# Patient Record
Sex: MALE | Race: WHITE | NOT HISPANIC OR LATINO | Employment: FULL TIME | URBAN - METROPOLITAN AREA
[De-identification: names, ages, dates, MRNs, and addresses within clinical notes are randomized per-mention and may not be internally consistent; named-entity substitution may affect disease eponyms.]

---

## 2018-04-22 ENCOUNTER — HOSPITAL ENCOUNTER (EMERGENCY)
Facility: HOSPITAL | Age: 48
Discharge: HOME | End: 2018-04-22
Attending: EMERGENCY MEDICINE
Payer: COMMERCIAL

## 2018-04-22 VITALS
SYSTOLIC BLOOD PRESSURE: 160 MMHG | HEART RATE: 108 BPM | HEIGHT: 71 IN | BODY MASS INDEX: 32.9 KG/M2 | RESPIRATION RATE: 16 BRPM | DIASTOLIC BLOOD PRESSURE: 84 MMHG | WEIGHT: 235 LBS | TEMPERATURE: 99 F | OXYGEN SATURATION: 96 %

## 2018-04-22 DIAGNOSIS — M25.40 JOINT EFFUSION: ICD-10-CM

## 2018-04-22 DIAGNOSIS — S89.91XA INJURY OF RIGHT KNEE, INITIAL ENCOUNTER: Primary | ICD-10-CM

## 2018-04-22 PROCEDURE — 29505 APPLICATION LONG LEG SPLINT: CPT | Mod: RT

## 2018-04-22 PROCEDURE — 99283 EMERGENCY DEPT VISIT LOW MDM: CPT | Mod: 25

## 2018-04-22 PROCEDURE — 99283 EMERGENCY DEPT VISIT LOW MDM: CPT | Mod: ,,, | Performed by: EMERGENCY MEDICINE

## 2018-04-22 PROCEDURE — 25000003 PHARM REV CODE 250: Performed by: PHYSICIAN ASSISTANT

## 2018-04-22 RX ORDER — NAPROXEN 500 MG/1
500 TABLET ORAL 2 TIMES DAILY WITH MEALS
Qty: 14 TABLET | Refills: 0 | Status: SHIPPED | OUTPATIENT
Start: 2018-04-22 | End: 2018-04-29

## 2018-04-22 RX ORDER — TRAMADOL HYDROCHLORIDE 50 MG/1
50 TABLET ORAL
Status: DISCONTINUED | OUTPATIENT
Start: 2018-04-22 | End: 2018-04-22 | Stop reason: HOSPADM

## 2018-04-22 RX ORDER — NAPROXEN 500 MG/1
500 TABLET ORAL
Status: COMPLETED | OUTPATIENT
Start: 2018-04-22 | End: 2018-04-22

## 2018-04-22 RX ORDER — BUPROPION HYDROCHLORIDE 100 MG/1
20 TABLET ORAL DAILY
COMMUNITY

## 2018-04-22 RX ORDER — NAPROXEN 500 MG/1
500 TABLET ORAL
Status: DISCONTINUED | OUTPATIENT
Start: 2018-04-22 | End: 2018-04-22

## 2018-04-22 RX ORDER — TRAMADOL HYDROCHLORIDE 50 MG/1
50 TABLET ORAL EVERY 12 HOURS PRN
Qty: 10 TABLET | Refills: 0 | Status: SHIPPED | OUTPATIENT
Start: 2018-04-22 | End: 2018-04-27

## 2018-04-22 RX ADMIN — NAPROXEN 500 MG: 500 TABLET ORAL at 05:04

## 2018-04-22 NOTE — ED TRIAGE NOTES
Patient states right knee pain after hearing a pop when pushing a 4 woodard up on Saturday at 1100, Ibuprofen (2) at 1000

## 2018-04-22 NOTE — ED PROVIDER NOTES
"Encounter Date: 4/22/2018    SCRIBE #1 NOTE: I, Luis F Estrada, am scribing for, and in the presence of,  Dr. Felipe. I have scribed the following portions of the note - the APC attestation.       History     Chief Complaint   Patient presents with    Knee Injury     Mr Taylor is a 47YOWM who presents with acute knee pain x 1 day, pertinent PMHx HTN. Pt states he was "pushing a four woodard out of the mud" when he felt and heard a "pop" in his R knee followed by immediate pain and swelling. His pain has been stable for the past day but swelling has increased. Patient is most noticeable in full extension of knee. He is NWB 2/2 pain status. Pain is not controlled with OTC advil. No prior trauma nor surgery to R knee. He denies paresthesia, skin laceration, pedal edema, hip pain, CP, SOB, N/V/D.           Review of patient's allergies indicates:   Allergen Reactions    Percocet [oxycodone-acetaminophen] Hives     Past Medical History:   Diagnosis Date    Elevated cholesterol     Hay fever     Hypertension      Past Surgical History:   Procedure Laterality Date    VASECTOMY       Family History   Problem Relation Age of Onset    Heart disease Father     Cancer Father 65     prostate    Hypertension Mother     Cancer Sister      female     Social History   Substance Use Topics    Smoking status: Current Every Day Smoker     Packs/day: 1.00     Years: 30.00     Types: Cigarettes     Last attempt to quit: 2/9/2013    Smokeless tobacco: Never Used      Comment: 15 cigarettes a day    Alcohol use Yes      Comment: yesterday     Review of Systems   Constitutional: Negative for chills, diaphoresis, fatigue and fever.   HENT: Negative for congestion, rhinorrhea, sinus pain, sinus pressure and sore throat.    Eyes: Negative for photophobia and visual disturbance.   Respiratory: Negative for cough, shortness of breath and wheezing.    Cardiovascular: Negative for chest pain, palpitations and leg swelling. "   Gastrointestinal: Negative for abdominal pain, constipation, diarrhea, nausea and vomiting.   Genitourinary: Negative for dysuria, flank pain, frequency and hematuria.   Musculoskeletal: Positive for arthralgias (R knee) and joint swelling.   Skin: Negative for color change, rash and wound.   Allergic/Immunologic: Negative for immunocompromised state.   Neurological: Negative for dizziness, weakness, light-headedness, numbness and headaches.   Psychiatric/Behavioral: Negative for agitation, confusion and decreased concentration. The patient is not nervous/anxious.        Physical Exam     Initial Vitals [04/22/18 1530]   BP Pulse Resp Temp SpO2   (!) 160/84 108 16 99.2 °F (37.3 °C) 96 %      MAP       109.33         Physical Exam    Nursing note and vitals reviewed.  Constitutional: He appears well-developed and well-nourished. He is not diaphoretic. No distress.   HENT:   Head: Normocephalic and atraumatic.   Right Ear: External ear normal.   Left Ear: External ear normal.   Mouth/Throat: No oropharyngeal exudate.   Eyes: Conjunctivae and EOM are normal. Pupils are equal, round, and reactive to light. No scleral icterus.   Neck: Normal range of motion.   Cardiovascular: Normal rate, regular rhythm, normal heart sounds and intact distal pulses.   R pedal pulse intact   Pulmonary/Chest: Breath sounds normal. He has no wheezes.   Abdominal: Soft. Bowel sounds are normal.   Musculoskeletal: Normal range of motion. He exhibits edema and tenderness.        Right knee: He exhibits swelling and effusion. He exhibits no deformity, no laceration, no erythema, normal alignment, no LCL laxity, no bony tenderness and no MCL laxity. No medial joint line, no lateral joint line, no MCL, no LCL and no patellar tendon tenderness noted.        Legs:  Area outlined in blue: area of acute swelling that is fluctuant and non TTP. Area is not warm, red and has no ecchymosis.  R Knee: Pain is most concentrated on posterior aspect of  "knee, with moderate joint effusion but no laci cyst palpable. Pain not reproduced on valgus/varus stress, no TTP over quadriceps tendon, patellar tendon, over patella, nor over midline. Negative anterior/posterior drawer tests, negative Mcmurrays test. NWB on exam; weight bearing reproduces pain in posterior knee.   Patient about to fully extend knee with no tenderness over quadriceps tendon. Able to fully flex knee with moderate pain to posterior knee.   Neurological: He is alert and oriented to person, place, and time. He has normal strength. No cranial nerve deficit or sensory deficit.   Skin: Skin is warm. Capillary refill takes less than 2 seconds. No rash noted. No erythema.   Psychiatric: He has a normal mood and affect. His behavior is normal. Thought content normal.         ED Course   Procedures  Labs Reviewed - No data to display     Imaging Results          X-Ray Knee 3 View Right (Final result)  Result time 04/22/18 17:08:57    Final result by Keagan Allen MD (04/22/18 17:08:57)                 Impression:      1. No acute displaced fracture or dislocation of the knee noting possible minimal suprapatellar effusion.      Electronically signed by: Keagan Allen MD  Date:    04/22/2018  Time:    17:08             Narrative:    EXAMINATION:  XR KNEE 3 VIEW RIGHT    CLINICAL HISTORY:  Effusion, unspecified joint    TECHNIQUE:  AP, lateral, and Merchant views of the right knee were performed.    COMPARISON:  None    FINDINGS:  No acute displaced fracture or dislocation of the knee.  There is a minimal suprapatellar effusion.  No radiopaque foreign body.                                   Medical Decision Making:   History:   Old Medical Records: I decided to obtain old medical records.  Initial Assessment:   Pt presents with acute R knee pain after hearing a "pop" while attempting to move 4-woodard. Joint effusion present with fluid concentration in medio-caudal aspect of R knee, most tenderness " concentrated to posterior knee through physical exam. All knee manipulation tests negative.  Differential Diagnosis:   DDX soft tissue injury, meniscus injurypartial quadriceps tendon tear, Baker's cyst rupture, PCL tear, ACL tear. Physical exam and history taking decrease clinical suspicion for patellar tendon rupture, patellar fracture, Osgood Schlatter's, joint hemorrhage, DVT.   Clinical Tests:   Radiological Study: Ordered and Reviewed  ED Management:  Plain films obtained, they reveal no acute bony abnormality but did a mild suprapatellar effusion. Patient given naproxen and Tramadol in ED for pain control d/t allergic reaction to Percocet. Soft tissue injury likely, unable to currently rule out ligamentous or meniscal injury to knee. We discussed ambulatory referral and follow up to orthopedics this week to pursue further imaging and ensure proper injury healing. He was given a knee immobilizer and crutches in ED and was counseled on conservative home care until ortho f/u. Given strict ED return instructions and short course of Rx Tramadol and naproxen. He agreed with plan of care and voiced understanding.             Scribe Attestation:   Scribe #1: I performed the above scribed service and the documentation accurately describes the services I performed. I attest to the accuracy of the note.    Attending Attestation:     Physician Attestation Statement for NP/PA:   I have conducted a face to face encounter with this patient in addition to the NP/PA, due to NP/PA Request          Attending ED Notes:   Upon my evaluation patient was ambulating with knee immbolizer in place, carrying his crutches. Advised patient to use weight bearing as tolerated, elevated and ice leg. Recommend ortho follow up if pain and swelling persistent for further evaluation of possible ligamentous injury.             Clinical Impression:   The primary encounter diagnosis was Injury of right knee, initial encounter. A diagnosis of Joint  effusion was also pertinent to this visit.    Disposition:   Disposition: Discharged  Condition: Stable    Endy Felipe MD  04/24/2018                      Ny Cabrera PA-C  04/22/18 [id number]       Endy Felipe MD  04/24/18 [id number]

## 2018-04-22 NOTE — ED NOTES
Patient identifiers verified and correct for Mr Taylor  C/C: Right knee pain   APPEARANCE: awake and alert in NAD.  SKIN: warm, dry and intact. No breakdown or bruising.  MUSCULOSKELETAL: Patient moving all extremities spontaneously, no obvious swelling or deformities noted. Ambulates independently.  RESPIRATORY: Denies shortness of breath.Respirations unlabored.   CARDIAC: Denies CP, 2+ distal pulses; no peripheral edema  ABDOMEN: S/ND/NT, Denies nausea  : voids spontaneously, denies difficulty  Neurologic: AAO x 4; follows commands equal strength in all extremities; denies numbness/tingling. Denies dizziness Denies weakness, positive sensation, mvmt and positive pedal pulses, color good RLE,

## 2018-04-22 NOTE — DISCHARGE INSTRUCTIONS
Take prescription medications as needed for knee pain. Use immobilizer throughout day in addition to heat, ice, and elevation. Follow up with orthopedics this week to pursue further imaging if indicated. Return to the ED immediately if knee becomes red, warm, if swelling increases, or if you develop fevers and chills.     Our goal in the emergency department is to always give you outstanding care and exceptional service. You may receive a survey by mail or e-mail in the next week regarding your experience in our ED. We would greatly appreciate your completing and returning the survey. Your feedback provides us with a way to recognize our staff who give very good care and it helps us learn how to improve when your experience was below our aspiration of excellence.

## 2018-05-18 ENCOUNTER — OFFICE VISIT (OUTPATIENT)
Dept: SPORTS MEDICINE | Facility: CLINIC | Age: 48
End: 2018-05-18
Payer: COMMERCIAL

## 2018-05-18 VITALS — BODY MASS INDEX: 32.9 KG/M2 | HEIGHT: 71 IN | WEIGHT: 235 LBS

## 2018-05-18 DIAGNOSIS — M23.91 KNEE INTERNAL DERANGEMENT, RIGHT: ICD-10-CM

## 2018-05-18 DIAGNOSIS — M25.461 KNEE EFFUSION, RIGHT: Primary | ICD-10-CM

## 2018-05-18 DIAGNOSIS — M25.561 ACUTE PAIN OF RIGHT KNEE: ICD-10-CM

## 2018-05-18 DIAGNOSIS — S76.311A STRAIN OF RIGHT HAMSTRING: ICD-10-CM

## 2018-05-18 PROCEDURE — 99999 PR PBB SHADOW E&M-EST. PATIENT-LVL III: CPT | Mod: PBBFAC,,, | Performed by: PHYSICIAN ASSISTANT

## 2018-05-18 PROCEDURE — 3008F BODY MASS INDEX DOCD: CPT | Mod: CPTII,S$GLB,, | Performed by: PHYSICIAN ASSISTANT

## 2018-05-18 PROCEDURE — 99204 OFFICE O/P NEW MOD 45 MIN: CPT | Mod: S$GLB,,, | Performed by: PHYSICIAN ASSISTANT

## 2018-05-18 RX ORDER — MELOXICAM 15 MG/1
15 TABLET ORAL DAILY
Qty: 30 TABLET | Refills: 0 | Status: SHIPPED | OUTPATIENT
Start: 2018-05-18 | End: 2018-06-06 | Stop reason: ALTCHOICE

## 2018-05-18 NOTE — PROGRESS NOTES
Subjective:          Chief Complaint: Fredy Taylor Jr. is a 47 y.o. male who had concerns including Pain of the Right Knee and Knee Pain (right knee ).    HPI   Patient who is a  presents to clinic with right knee pain x 4 weeks. He was riding a 4 woodard when the injury occurred. It was about to flip over so he stuck his right leg out to catch himself. He experienced a large effusion and pain with ambulation. He went to the [facility] ED, where he had an xray taken and was placed in an immobilizer. He wore the immobilizer for 1 week. He has been elevating his knee and taking ibuprofen as needed for pain. Denies mechanical symptoms. Pain is worse with twisting of the knee. Pain at its worst is 7-8/10.  Pain at rest is 5/10.     Review of Systems   Constitution: Negative. Negative for chills, fever, weight gain and weight loss.   HENT: Negative for congestion and sore throat.    Eyes: Negative for blurred vision and double vision.   Cardiovascular: Negative for chest pain, leg swelling and palpitations.   Respiratory: Negative for cough and shortness of breath.    Hematologic/Lymphatic: Does not bruise/bleed easily.   Skin: Negative for itching, poor wound healing and rash.   Musculoskeletal: Positive for joint pain, joint swelling and stiffness. Negative for back pain, muscle weakness and myalgias.   Gastrointestinal: Negative for abdominal pain, constipation, diarrhea, nausea and vomiting.   Genitourinary: Negative.  Negative for frequency and hematuria.   Neurological: Negative for dizziness, headaches, numbness, paresthesias and sensory change.   Psychiatric/Behavioral: Negative for altered mental status and depression. The patient is not nervous/anxious.    Allergic/Immunologic: Negative for hives.       Pain Related Questions  Over the past 3 days, what was your average pain during activity? (I.e. running, jogging, walking, climbing stairs, getting dressed, ect.): 7  Over the past 3 days, what was  your highest pain level?: 7  Over the past 3 days, what was your lowest pain level? : 2    Other  How many nights a week are you awakened by your affected body part?: 0  Was the patient's HEIGHT measured or patient reported?: Patient Reported  Was the patient's WEIGHT measured or patient reported?: Patient Reported      Objective:        General: Fredy is well-developed, well-nourished, appears stated age, in no acute distress, alert and oriented to time, place and person.     General    Vitals reviewed.  Constitutional: He is oriented to person, place, and time. He appears well-developed and well-nourished. No distress.   HENT:   Head: Normocephalic and atraumatic.   Eyes: EOM are normal.   Neck: Normal range of motion.   Cardiovascular: Normal rate and regular rhythm.    Pulmonary/Chest: Effort normal. No respiratory distress.   Neurological: He is alert and oriented to person, place, and time. He has normal reflexes. No cranial nerve deficit. Coordination normal.   Psychiatric: He has a normal mood and affect. His behavior is normal. Judgment and thought content normal.     General Musculoskeletal Exam   Gait: antalgic and abnormal       Right Knee Exam     Inspection   Erythema: absent  Scars: absent  Swelling: present  Effusion: present  Deformity: deformity  Bruising: absent    Tenderness   The patient is tender to palpation of the medial joint line, lateral joint line and medial hamstring.    Range of Motion   Extension:  0 normal   Flexion:  130 normal     Tests   Meniscus   Mikel:  Medial - positive Lateral - positive  Ligament Examination   Lachman: abnormal - grade IPCL-Posterior Drawer: normal (0 to 2mm)     MCL - Valgus: normal (0 to 2mm)  LCL - Varus: normalPivot Shift: normal (Equal)Reverse Pivot Shift: normal (Equal)Dial Test at 30 degrees: normal (< 5 degrees)Dial Test at 90 degrees: normal (< 5 degrees)  Posterolateral Corner: unstable (>15 degrees difference)  Patella   Passive Patellar Tilt:  neutral  Patellar Glide (quadrants): Lateral - 1   Medial - 2  Patellar Grind: negative    Other   Muscle Tightness: hamstring tightness  Sensation: normal    Left Knee Exam     Inspection   Erythema: absent  Scars: absent  Swelling: absent  Effusion: absent  Deformity: deformity  Bruising: absent    Tenderness   The patient is experiencing no tenderness.         Range of Motion   Extension:  0 normal   Flexion: normal Left knee flexion: 135.     Tests   Meniscus   Mikel:  Medial - negative Lateral - negative  Stability Lachman: normal (-1 to 2mm) PCL-Posterior Drawer: normal (0 to 2mm)  MCL - Valgus: normal (0 to 2mm)  LCL - Varus: normal (0 to 2mm)Pivot Shift: normal (Equal)Reverse Pivot Shift: normal (Equal)Dial Test at 30 degrees: normal (< 5 degrees)Dial Test at 90 degrees: normal (< 5 degrees)  Posterolateral Corner: unstable (>15 degrees difference)  Patella   Passive Patellar Tilt: neutral  Patellar Glide (Quadrants): Lateral - 1 Medial - 2  Patellar Grind: negative    Other   Sensation: normal    Muscle Strength   Right Lower Extremity   Hip Abduction: 5/5   Quadriceps:  5/5   Hamstrin/5   Left Lower Extremity   Hip Abduction: 5/5   Quadriceps:  5/5   Hamstrin/5     Reflexes     Left Side  Quadriceps:  2+  Achilles:  2+    Right Side   Quadriceps:  2+  Achilles:  2+    Vascular Exam     Right Pulses  Dorsalis Pedis:      2+  Posterior Tibial:      2+        Left Pulses  Dorsalis Pedis:      2+  Posterior Tibial:      2+          RADIOGRAPHS:  FINDINGS:  No acute displaced fracture or dislocation of the knee.  There is a minimal suprapatellar effusion.  No radiopaque foreign body.          Assessment:       Encounter Diagnoses   Name Primary?    Knee effusion, right Yes    Acute pain of right knee     Knee internal derangement, right     Strain of right hamstring           Plan:       1. MRI of right knee to rule out medial/lateral meniscus tear and possible ACL tear    2. Mobic 15 mg once a  day prn pain    3. Ice/ Elevate/compression    4. [id number] - Tay , performed a custom orthotic / brace adjustment, fitting and training with the patient. The patient demonstrated understanding and proper care. This was performed for 15 minutes. Viscoskin    5. The total face-to-face encounter time with this patient was 45  minutes and greater than 50% of of the encounter time was spent counseling the patient and coordinating care. Significant time was also spent educating the patient, giving detail post-visit instructions, and discussing risks and benefits of treatment. Patient also had several specific questions that were answered during the visit today.     6. RTC in 1 week with Keila Moody PA-C for MRI results review                Patient questionnaires may have been collected.

## 2018-05-24 ENCOUNTER — HOSPITAL ENCOUNTER (OUTPATIENT)
Dept: RADIOLOGY | Facility: HOSPITAL | Age: 48
Discharge: HOME | End: 2018-05-24
Attending: PHYSICIAN ASSISTANT
Payer: COMMERCIAL

## 2018-05-24 DIAGNOSIS — M25.561 ACUTE PAIN OF RIGHT KNEE: ICD-10-CM

## 2018-05-24 DIAGNOSIS — M25.461 KNEE EFFUSION, RIGHT: ICD-10-CM

## 2018-05-24 DIAGNOSIS — M23.91 KNEE INTERNAL DERANGEMENT, RIGHT: ICD-10-CM

## 2018-05-24 PROCEDURE — 73721 MRI JNT OF LWR EXTRE W/O DYE: CPT | Mod: 26,RT,, | Performed by: RADIOLOGY

## 2018-05-24 PROCEDURE — 73721 MRI JNT OF LWR EXTRE W/O DYE: CPT | Mod: TC,RT

## 2018-06-05 ENCOUNTER — TELEPHONE (OUTPATIENT)
Dept: SPORTS MEDICINE | Facility: CLINIC | Age: 48
End: 2018-06-05

## 2018-06-06 ENCOUNTER — OFFICE VISIT (OUTPATIENT)
Dept: SPORTS MEDICINE | Facility: CLINIC | Age: 48
End: 2018-06-06
Payer: COMMERCIAL

## 2018-06-06 VITALS
WEIGHT: 235 LBS | SYSTOLIC BLOOD PRESSURE: 154 MMHG | DIASTOLIC BLOOD PRESSURE: 84 MMHG | BODY MASS INDEX: 32.9 KG/M2 | HEART RATE: 95 BPM | HEIGHT: 71 IN

## 2018-06-06 DIAGNOSIS — S83.511D RUPTURE OF ANTERIOR CRUCIATE LIGAMENT OF RIGHT KNEE, SUBSEQUENT ENCOUNTER: ICD-10-CM

## 2018-06-06 DIAGNOSIS — M23.91 ACUTE INTERNAL DERANGEMENT OF RIGHT KNEE: ICD-10-CM

## 2018-06-06 DIAGNOSIS — M25.561 ACUTE PAIN OF RIGHT KNEE: Primary | ICD-10-CM

## 2018-06-06 PROBLEM — S83.511A RUPTURE OF ANTERIOR CRUCIATE LIGAMENT OF RIGHT KNEE: Status: ACTIVE | Noted: 2018-06-06

## 2018-06-06 PROCEDURE — 99999 PR PBB SHADOW E&M-EST. PATIENT-LVL III: CPT | Mod: PBBFAC,,, | Performed by: ORTHOPAEDIC SURGERY

## 2018-06-06 PROCEDURE — 99214 OFFICE O/P EST MOD 30 MIN: CPT | Mod: S$GLB,,, | Performed by: ORTHOPAEDIC SURGERY

## 2018-06-06 PROCEDURE — 3008F BODY MASS INDEX DOCD: CPT | Mod: CPTII,S$GLB,, | Performed by: ORTHOPAEDIC SURGERY

## 2018-06-06 PROCEDURE — 3079F DIAST BP 80-89 MM HG: CPT | Mod: CPTII,S$GLB,, | Performed by: ORTHOPAEDIC SURGERY

## 2018-06-06 PROCEDURE — 3077F SYST BP >= 140 MM HG: CPT | Mod: CPTII,S$GLB,, | Performed by: ORTHOPAEDIC SURGERY

## 2018-06-06 RX ORDER — CELECOXIB 200 MG/1
200 CAPSULE ORAL 2 TIMES DAILY
Qty: 60 CAPSULE | Refills: 2 | Status: SHIPPED | OUTPATIENT
Start: 2018-06-06 | End: 2018-07-06
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
## 2018-06-11 ENCOUNTER — TELEPHONE (OUTPATIENT)
Dept: SPORTS MEDICINE | Facility: CLINIC | Age: 48
End: 2018-06-11

## 2018-06-11 NOTE — TELEPHONE ENCOUNTER
----- Message from Alissa Robin sent at 6/11/2018  8:53 AM CDT -----  Contact: self  Pt called requesting a call back from Lula regarding questions about restrictions for his right knee. Pt could be reached at [phone]

## 2018-06-11 NOTE — TELEPHONE ENCOUNTER
Spoke with the patient. Explained that prior to surgery, he should avoid running, jumping and pivoting movements. He can take OTC Tylenol with the Celebrex.

## 2018-06-13 DIAGNOSIS — S83.511A RUPTURE OF ANTERIOR CRUCIATE LIGAMENT OF RIGHT KNEE, INITIAL ENCOUNTER: Primary | ICD-10-CM

## 2018-06-20 ENCOUNTER — TELEPHONE (OUTPATIENT)
Dept: SPORTS MEDICINE | Facility: CLINIC | Age: 48
End: 2018-06-20

## 2018-06-20 NOTE — TELEPHONE ENCOUNTER
Received FMLA and STD forms to fill out. Forms were completed and faxed to the number on the form: [phone].    [facility]

## 2018-06-25 ENCOUNTER — OFFICE VISIT (OUTPATIENT)
Dept: SPORTS MEDICINE | Facility: CLINIC | Age: 48
End: 2018-06-25
Payer: COMMERCIAL

## 2018-06-25 ENCOUNTER — ANESTHESIA EVENT (OUTPATIENT)
Dept: SURGERY | Facility: OTHER | Age: 48
End: 2018-06-25
Payer: COMMERCIAL

## 2018-06-25 ENCOUNTER — HOSPITAL ENCOUNTER (OUTPATIENT)
Dept: PREADMISSION TESTING | Facility: OTHER | Age: 48
Discharge: HOME | End: 2018-06-25
Attending: ORTHOPAEDIC SURGERY
Payer: COMMERCIAL

## 2018-06-25 ENCOUNTER — TELEPHONE (OUTPATIENT)
Dept: SPORTS MEDICINE | Facility: CLINIC | Age: 48
End: 2018-06-25

## 2018-06-25 VITALS
OXYGEN SATURATION: 97 % | TEMPERATURE: 99 F | HEART RATE: 91 BPM | SYSTOLIC BLOOD PRESSURE: 121 MMHG | HEIGHT: 71 IN | DIASTOLIC BLOOD PRESSURE: 64 MMHG | BODY MASS INDEX: 32.9 KG/M2 | WEIGHT: 235 LBS

## 2018-06-25 VITALS
WEIGHT: 235 LBS | HEIGHT: 71 IN | SYSTOLIC BLOOD PRESSURE: 124 MMHG | HEART RATE: 89 BPM | DIASTOLIC BLOOD PRESSURE: 75 MMHG | BODY MASS INDEX: 32.9 KG/M2

## 2018-06-25 DIAGNOSIS — S83.511D RUPTURE OF ANTERIOR CRUCIATE LIGAMENT OF RIGHT KNEE, SUBSEQUENT ENCOUNTER: Primary | ICD-10-CM

## 2018-06-25 DIAGNOSIS — M23.91 ACUTE INTERNAL DERANGEMENT OF RIGHT KNEE: ICD-10-CM

## 2018-06-25 DIAGNOSIS — M25.561 ACUTE PAIN OF RIGHT KNEE: ICD-10-CM

## 2018-06-25 DIAGNOSIS — S83.519A ACL TEAR: ICD-10-CM

## 2018-06-25 PROCEDURE — 3074F SYST BP LT 130 MM HG: CPT | Mod: CPTII,S$GLB,, | Performed by: ORTHOPAEDIC SURGERY

## 2018-06-25 PROCEDURE — 99214 OFFICE O/P EST MOD 30 MIN: CPT | Mod: S$GLB,,, | Performed by: ORTHOPAEDIC SURGERY

## 2018-06-25 PROCEDURE — 3078F DIAST BP <80 MM HG: CPT | Mod: CPTII,S$GLB,, | Performed by: ORTHOPAEDIC SURGERY

## 2018-06-25 PROCEDURE — 99999 PR PBB SHADOW E&M-EST. PATIENT-LVL III: CPT | Mod: PBBFAC,,, | Performed by: ORTHOPAEDIC SURGERY

## 2018-06-25 PROCEDURE — 3008F BODY MASS INDEX DOCD: CPT | Mod: CPTII,S$GLB,, | Performed by: ORTHOPAEDIC SURGERY

## 2018-06-25 RX ORDER — SODIUM CHLORIDE 9 MG/ML
INJECTION, SOLUTION INTRAVENOUS CONTINUOUS
Status: CANCELLED | OUTPATIENT
Start: 2018-06-25

## 2018-06-25 RX ORDER — TRAMADOL HYDROCHLORIDE 50 MG/1
50 TABLET ORAL EVERY 6 HOURS PRN
Qty: 60 TABLET | Refills: 0 | Status: SHIPPED | OUTPATIENT
Start: 2018-06-25 | End: 2018-07-05

## 2018-06-25 RX ORDER — CELECOXIB 200 MG/1
200 CAPSULE ORAL 2 TIMES DAILY
Qty: 84 CAPSULE | Refills: 0 | Status: SHIPPED | OUTPATIENT
Start: 2018-06-25 | End: 2018-08-06

## 2018-06-25 RX ORDER — LIDOCAINE HYDROCHLORIDE 10 MG/ML
0.5 INJECTION, SOLUTION EPIDURAL; INFILTRATION; INTRACAUDAL; PERINEURAL ONCE
Status: CANCELLED | OUTPATIENT
Start: 2018-06-25 | End: 2018-06-25

## 2018-06-25 RX ORDER — MUPIROCIN 20 MG/G
OINTMENT TOPICAL
Status: CANCELLED | OUTPATIENT
Start: 2018-06-25

## 2018-06-25 RX ORDER — HYDROCODONE BITARTRATE AND ACETAMINOPHEN 10; 325 MG/1; MG/1
1 TABLET ORAL EVERY 6 HOURS PRN
Qty: 50 TABLET | Refills: 0 | Status: ON HOLD | OUTPATIENT
Start: 2018-06-25 | End: 2023-11-09 | Stop reason: HOSPADM

## 2018-06-25 RX ORDER — SODIUM CHLORIDE, SODIUM LACTATE, POTASSIUM CHLORIDE, CALCIUM CHLORIDE 600; 310; 30; 20 MG/100ML; MG/100ML; MG/100ML; MG/100ML
INJECTION, SOLUTION INTRAVENOUS CONTINUOUS
Status: CANCELLED | OUTPATIENT
Start: 2018-06-25

## 2018-06-25 RX ORDER — ASPIRIN 325 MG
325 TABLET ORAL DAILY
Qty: 42 TABLET | Refills: 0 | COMMUNITY
Start: 2018-06-25 | End: 2018-08-23

## 2018-06-25 RX ORDER — PROMETHAZINE HYDROCHLORIDE 25 MG/1
25 TABLET ORAL EVERY 6 HOURS PRN
Qty: 20 TABLET | Refills: 0 | Status: ON HOLD | OUTPATIENT
Start: 2018-06-25 | End: 2023-11-09 | Stop reason: HOSPADM

## 2018-06-25 RX ORDER — FAMOTIDINE 20 MG/1
20 TABLET, FILM COATED ORAL
Status: CANCELLED | OUTPATIENT
Start: 2018-06-25 | End: 2018-06-25

## 2018-06-25 RX ORDER — MIDAZOLAM HYDROCHLORIDE 5 MG/ML
5 INJECTION INTRAMUSCULAR; INTRAVENOUS ONCE AS NEEDED
Status: CANCELLED | OUTPATIENT
Start: 2018-06-25 | End: 2018-06-25

## 2018-06-25 NOTE — TELEPHONE ENCOUNTER
Called patient and let him know that he needs to keep taking there celebrex leading up to his surgery.

## 2018-06-25 NOTE — TELEPHONE ENCOUNTER
----- Message from Annabella Vaughan sent at 6/25/2018 12:18 PM CDT -----  Contact: Self  He saw an anesthesiologist today as a pre-op visit and they told him to stop taking his medication prior to surgery. celecoxib (CELEBREX) 200 MG capsule. He just wants to verify that this is OK with Dr. Coffey.     Patient can be reached at [phone]

## 2018-06-25 NOTE — H&P
Fredy SANTOS Claudia Santana  is here for a completion of his perioperative paperwork. he  Is scheduled to undergo   1. Right knee arthroscopic ACL repair versus reconstruction  2. Right knee arthroscopic chondroplasty  3. Right knee arthroscopic synovectomy  4. Right knee Amniox arthrocentesis 99131 on 7/10/18.  He is a healthy individual and does not need clearance for this procedure.     Risks, indications and benefits of the surgical procedure were discussed with the patient. All questions with regard to surgery, rehab, expected return to functional activities, activities of daily living and recreational endeavors were answered to his satisfaction.    Once no other questions were asked, a brief history and physical exam was then performed.      PAST MEDICAL HISTORY:   Past Medical History:   Diagnosis Date    Elevated cholesterol     Hay fever     Hypertension      PAST SURGICAL HISTORY:   Past Surgical History:   Procedure Laterality Date    VASECTOMY       FAMILY HISTORY:   Family History   Problem Relation Age of Onset    Heart disease Father     Cancer Father 65        prostate    Hypertension Mother     Cancer Sister         female     SOCIAL HISTORY:   Social History     Social History    Marital status: Single     Spouse name: N/A    Number of children: N/A    Years of education: N/A     Occupational History     [employer]     Social History Main Topics    Smoking status: Current Every Day Smoker     Packs/day: 1.00     Years: 30.00     Types: Cigarettes     Last attempt to quit: 2/9/2013    Smokeless tobacco: Never Used      Comment: 15 cigarettes a day    Alcohol use Yes      Comment: yesterday    Drug use: No    Sexual activity: Not on file     Other Topics Concern    Not on file     Social History Narrative    No narrative on file       MEDICATIONS:   Current Outpatient Prescriptions:     buPROPion (WELLBUTRIN) 100 MG tablet, Take 20 mg by mouth once daily., Disp: , Rfl:      celecoxib (CELEBREX) 200 MG capsule, Take 1 capsule (200 mg total) by mouth 2 (two) times daily., Disp: 60 capsule, Rfl: 2    clotrimazole-betamethasone 1-0.05% (LOTRISONE) cream, Apply topically 2 (two) times daily., Disp: 45 g, Rfl: 0    fluticasone (FLONASE) 50 mcg/actuation nasal spray, 1 spray by Each Nare route once daily., Disp: , Rfl:     losartan (COZAAR) 25 MG tablet, Take 1 tablet (25 mg total) by mouth once daily., Disp: 30 tablet, Rfl: 6  ALLERGIES:   Review of patient's allergies indicates:   Allergen Reactions    Percocet [oxycodone-acetaminophen] Hives       VITAL SIGNS: There were no vitals taken for this visit.       PHYSICAL EXAM:  GEN: A&Ox3, WD WN NAD  HEENT: WNL  CHEST: CTAB, no W/R/R  HEART: RRR, no M/R/G  ABD: Soft, NT ND, BS x4 QUADS  MS; See Epic  NEURO: CN II-XII intact       The surgical consent was then reviewed with the patient, who agreed with all the contents of the consent form and it was signed. he was then given the [facility] surgery packet to bring with him to [facility] for the anesthesia portion of his perioperative paperwork.     PHYSICAL THERAPY:  He was also instructed regarding physical therapy and will begin on  POD 3.     POST OP CARE:instructions were reviewed including care of the wound and dressing after surgery and when he can shower.     PAIN MANAGEMENT: Fredy Taylor Jr. was also given  pain management regime, which includes the TENS unit given to him by Faraz Perez along with the education required for its use. He was also instructed regarding the Polar ice unit that will be in place after surgery and his postoperative pain medications.     PAIN MEDICATION:  Hydrocodone 10/325mg 1 po q 4-6 hours prn pain  Ultram 50 mg one p.o. q.4-6 hours p.r.n. breakthrough pain,   Phenergan 25 mg one p.o. q.4-6 hours p.r.n. nausea and vomiting.    As there were no other questions to be asked, he was given my business card along with Ryland Coffey MD business card if he has any  questions or concerns prior to surgery or in the postop period.

## 2018-06-25 NOTE — ANESTHESIA PREPROCEDURE EVALUATION
06/25/2018  Fredy Taylor Jr. is a 47 y.o., male.    Anesthesia Evaluation    I have reviewed the Patient Summary Reports.    I have reviewed the Nursing Notes.   I have reviewed the Medications.     Review of Systems  Anesthesia Hx:  No problems with previous Anesthesia  Denies Family Hx of Anesthesia complications.   Denies Personal Hx of Anesthesia complications.   Social:  Non-Smoker, Smoker Quit 18 mos ago, prev 1 ppd for 22 y   Hematology/Oncology:  Hematology Normal   Oncology Normal     EENT/Dental:EENT/Dental Normal   Cardiovascular:   Exercise tolerance: good Hypertension, well controlled    Pulmonary:  Pulmonary Normal    Renal/:  Renal/ Normal     Hepatic/GI:  Hepatic/GI Normal    Musculoskeletal:  Musculoskeletal Normal    Neurological:  Neurology Normal    Endocrine:  Endocrine Normal    Dermatological:  Skin Normal    Psych:   Psychiatric History depression          Physical Exam  General:  Well nourished, Obesity    Airway/Jaw/Neck:  Airway Findings: Mouth Opening: Small, but > 3cm General Airway Assessment: Possible difficult mask airway, Possible difficult intubation  Mallampati: III  Improves to II with phonation.  TM Distance: Normal, at least 6 cm  Jaw/Neck Findings:  Neck ROM: Normal ROM  Neck Findings:  Girth Increased      Dental:  Dental Findings: In tact              Anesthesia Plan  Type of Anesthesia, risks & benefits discussed:  Anesthesia Type:  general  Patient's Preference:   Intra-op Monitoring Plan: standard ASA monitors  Intra-op Monitoring Plan Comments:   Post Op Pain Control Plan: per primary service following discharge from PACU and peripheral nerve block  Post Op Pain Control Plan Comments: Discussed  plexus block with sedation  Induction:   IV  Beta Blocker:         Informed Consent: Patient understands risks and agrees with Anesthesia plan.  Questions answered.  Anesthesia consent signed with patient.  ASA Score: 2     Day of Surgery Review of History & Physical:    H&P update referred to the surgeon.     Anesthesia Plan Notes: No labs.        Ready For Surgery From Anesthesia Perspective.

## 2018-06-25 NOTE — DISCHARGE INSTRUCTIONS
PRE-ADMIT TESTING -  [phone]    [address]  [place] [facility]          Your surgery has been scheduled at [facility]. We are pleased to have the opportunity to serve you. For Further Information please call [phone].    On the day of surgery please report to the Information Desk on the 1st floor.    · CONTACT YOUR PHYSICIAN'S OFFICE THE DAY PRIOR TO YOUR SURGERY TO OBTAIN YOUR ARRIVAL TIME.     · The evening before surgery do not eat anything after 9 p.m. ( this includes hard candy, chewing gum and mints).  You may only have GATORADE, POWERADE AND WATER  from 9 p.m. until you leave your home.   DO NOT DRINK ANY LIQUIDS ON THE WAY TO THE HOSPITAL.      SPECIAL MEDICATION INSTRUCTIONS: TAKE medications checked off by the Anesthesiologist on your Medication List.    Angiogram Patients: Take medications as instructed by your physician, including aspirin.     Surgery Patients:    If you take ASPIRIN - Your PHYSICIAN/SURGEON will need to inform you IF/OR when you need to stop taking aspirin prior to your surgery.     Do Not take any medications containing IBUPROFEN.  Do Not Wear any make-up or dark nail polish   (especially eye make-up) to surgery. If you come to surgery with makeup on you will be required to remove the makeup or nail polish.    Do not shave your surgical area at least 5 days prior to your surgery. The surgical prep will be performed at the hospital according to Infection Control regulations.    Leave all valuables at home.   Do Not wear any jewelry or watches, including any metal in body piercings.  Contact Lens must be removed before surgery. Either do not wear the contact lens or bring a case and solution for storage.  Please bring a container for eyeglasses or dentures as required.  Bring any paperwork your physician has provided, such as consent forms,  history and physicals, doctor's orders, etc.   Bring comfortable clothes that are loose fitting to wear upon  discharge. Take into consideration the type of surgery being performed.  Maintain your diet as advised per your physician the day prior to surgery.      Adequate rest the night before surgery is advised.   Park in the Parking lot behind the hospital or in the [place] [facility] across the street from the parking lot. Parking is complimentary.  If you will be discharged the same day as your procedure, please arrange for a responsible adult to drive you home or to accompany you if traveling by taxi.   YOU WILL NOT BE PERMITTED TO DRIVE OR TO LEAVE THE HOSPITAL ALONE AFTER SURGERY.   It is strongly recommended that you arrange for someone to remain with you for the first 24 hrs following your surgery.       Thank you for your cooperation.  The Staff of [facility].        Bathing Instructions                                                                 Please shower the evening before and morning of your procedure with    ANTIBACTERIAL SOAP. ( DIAL, etc )  Concentrate on the surgical area   for at least 3 minutes and rinse completely. Dry off as usual.   Do not use any deodorant, powder, body lotions, perfume, after shave or    cologne.

## 2018-07-09 ENCOUNTER — TELEPHONE (OUTPATIENT)
Dept: SPORTS MEDICINE | Facility: CLINIC | Age: 48
End: 2018-07-09

## 2018-07-10 ENCOUNTER — HOSPITAL ENCOUNTER (OUTPATIENT)
Facility: OTHER | Age: 48
Discharge: HOME | End: 2018-07-10
Attending: ORTHOPAEDIC SURGERY | Admitting: ORTHOPAEDIC SURGERY
Payer: COMMERCIAL

## 2018-07-10 ENCOUNTER — ANESTHESIA (OUTPATIENT)
Dept: SURGERY | Facility: OTHER | Age: 48
End: 2018-07-10
Payer: COMMERCIAL

## 2018-07-10 VITALS
SYSTOLIC BLOOD PRESSURE: 142 MMHG | TEMPERATURE: 98 F | DIASTOLIC BLOOD PRESSURE: 77 MMHG | RESPIRATION RATE: 16 BRPM | OXYGEN SATURATION: 94 % | BODY MASS INDEX: 32.9 KG/M2 | HEIGHT: 71 IN | HEART RATE: 86 BPM | WEIGHT: 235 LBS

## 2018-07-10 DIAGNOSIS — S83.519A ACL TEAR: ICD-10-CM

## 2018-07-10 DIAGNOSIS — S83.511D RUPTURE OF ANTERIOR CRUCIATE LIGAMENT OF RIGHT KNEE, SUBSEQUENT ENCOUNTER: ICD-10-CM

## 2018-07-10 PROCEDURE — C1713 ANCHOR/SCREW BN/BN,TIS/BN: HCPCS | Performed by: ORTHOPAEDIC SURGERY

## 2018-07-10 PROCEDURE — 36000711: Performed by: ORTHOPAEDIC SURGERY

## 2018-07-10 PROCEDURE — V2790 AMNIOTIC MEMBRANE: HCPCS | Performed by: ORTHOPAEDIC SURGERY

## 2018-07-10 PROCEDURE — 71000033 HC RECOVERY, INTIAL HOUR: Performed by: ORTHOPAEDIC SURGERY

## 2018-07-10 PROCEDURE — 63600175 PHARM REV CODE 636 W HCPCS: Performed by: NURSE ANESTHETIST, CERTIFIED REGISTERED

## 2018-07-10 PROCEDURE — 36000710: Performed by: ORTHOPAEDIC SURGERY

## 2018-07-10 PROCEDURE — 63600175 PHARM REV CODE 636 W HCPCS: Performed by: ANESTHESIOLOGY

## 2018-07-10 PROCEDURE — 25000003 PHARM REV CODE 250: Performed by: SPECIALIST

## 2018-07-10 PROCEDURE — 63600175 PHARM REV CODE 636 W HCPCS: Performed by: ORTHOPAEDIC SURGERY

## 2018-07-10 PROCEDURE — 37000009 HC ANESTHESIA EA ADD 15 MINS: Performed by: ORTHOPAEDIC SURGERY

## 2018-07-10 PROCEDURE — 27201423 OPTIME MED/SURG SUP & DEVICES STERILE SUPPLY: Performed by: ORTHOPAEDIC SURGERY

## 2018-07-10 PROCEDURE — 71000016 HC POSTOP RECOV ADDL HR: Performed by: ORTHOPAEDIC SURGERY

## 2018-07-10 PROCEDURE — 37000008 HC ANESTHESIA 1ST 15 MINUTES: Performed by: ORTHOPAEDIC SURGERY

## 2018-07-10 PROCEDURE — 63600175 PHARM REV CODE 636 W HCPCS: Performed by: SPECIALIST

## 2018-07-10 PROCEDURE — 25000003 PHARM REV CODE 250: Performed by: ORTHOPAEDIC SURGERY

## 2018-07-10 PROCEDURE — 29888 ARTHRS AID ACL RPR/AGMNTJ: CPT | Mod: RT,,, | Performed by: ORTHOPAEDIC SURGERY

## 2018-07-10 PROCEDURE — 25000003 PHARM REV CODE 250: Performed by: NURSE ANESTHETIST, CERTIFIED REGISTERED

## 2018-07-10 PROCEDURE — 71000015 HC POSTOP RECOV 1ST HR: Performed by: ORTHOPAEDIC SURGERY

## 2018-07-10 DEVICE — ANCHOR BIOCOMP SWVLLOK: Type: IMPLANTABLE DEVICE | Site: KNEE | Status: FUNCTIONAL

## 2018-07-10 DEVICE — MATRIX REGENERATIVE CLARIX FLO: Type: IMPLANTABLE DEVICE | Site: KNEE | Status: FUNCTIONAL

## 2018-07-10 DEVICE — KIT SECONDARY FIX ACL PCL REP: Type: IMPLANTABLE DEVICE | Site: KNEE | Status: FUNCTIONAL

## 2018-07-10 RX ORDER — ACETAMINOPHEN 10 MG/ML
INJECTION, SOLUTION INTRAVENOUS
Status: DISCONTINUED | OUTPATIENT
Start: 2018-07-10 | End: 2018-07-10

## 2018-07-10 RX ORDER — MORPHINE SULFATE 10 MG/ML
2 INJECTION INTRAMUSCULAR; INTRAVENOUS; SUBCUTANEOUS EVERY 4 HOURS PRN
Status: CANCELLED | OUTPATIENT
Start: 2018-07-10

## 2018-07-10 RX ORDER — ONDANSETRON 2 MG/ML
4 INJECTION INTRAMUSCULAR; INTRAVENOUS DAILY PRN
Status: DISCONTINUED | OUTPATIENT
Start: 2018-07-10 | End: 2018-07-10 | Stop reason: HOSPADM

## 2018-07-10 RX ORDER — MIDAZOLAM HYDROCHLORIDE 1 MG/ML
5 INJECTION INTRAMUSCULAR; INTRAVENOUS ONCE
Status: DISCONTINUED | OUTPATIENT
Start: 2018-07-10 | End: 2018-07-10 | Stop reason: HOSPADM

## 2018-07-10 RX ORDER — ROPIVACAINE HYDROCHLORIDE 5 MG/ML
INJECTION, SOLUTION EPIDURAL; INFILTRATION; PERINEURAL
Status: DISCONTINUED | OUTPATIENT
Start: 2018-07-10 | End: 2018-07-10

## 2018-07-10 RX ORDER — DEXAMETHASONE SODIUM PHOSPHATE 4 MG/ML
INJECTION, SOLUTION INTRA-ARTICULAR; INTRALESIONAL; INTRAMUSCULAR; INTRAVENOUS; SOFT TISSUE
Status: DISCONTINUED | OUTPATIENT
Start: 2018-07-10 | End: 2018-07-10

## 2018-07-10 RX ORDER — OXYCODONE HYDROCHLORIDE 5 MG/1
5 TABLET ORAL
Status: DISCONTINUED | OUTPATIENT
Start: 2018-07-10 | End: 2018-07-10 | Stop reason: HOSPADM

## 2018-07-10 RX ORDER — CEFAZOLIN SODIUM 2 G/50ML
2 SOLUTION INTRAVENOUS
Status: COMPLETED | OUTPATIENT
Start: 2018-07-10 | End: 2018-07-10

## 2018-07-10 RX ORDER — SODIUM CHLORIDE, SODIUM LACTATE, POTASSIUM CHLORIDE, CALCIUM CHLORIDE 600; 310; 30; 20 MG/100ML; MG/100ML; MG/100ML; MG/100ML
INJECTION, SOLUTION INTRAVENOUS CONTINUOUS
Status: DISCONTINUED | OUTPATIENT
Start: 2018-07-10 | End: 2018-07-10 | Stop reason: HOSPADM

## 2018-07-10 RX ORDER — MEPERIDINE HYDROCHLORIDE 50 MG/ML
12.5 INJECTION INTRAMUSCULAR; INTRAVENOUS; SUBCUTANEOUS ONCE AS NEEDED
Status: DISCONTINUED | OUTPATIENT
Start: 2018-07-10 | End: 2018-07-10 | Stop reason: HOSPADM

## 2018-07-10 RX ORDER — FENTANYL CITRATE 50 UG/ML
25 INJECTION, SOLUTION INTRAMUSCULAR; INTRAVENOUS EVERY 5 MIN PRN
Status: DISCONTINUED | OUTPATIENT
Start: 2018-07-10 | End: 2018-07-10 | Stop reason: HOSPADM

## 2018-07-10 RX ORDER — SODIUM CHLORIDE 9 MG/ML
INJECTION, SOLUTION INTRAVENOUS CONTINUOUS
Status: DISCONTINUED | OUTPATIENT
Start: 2018-07-10 | End: 2018-07-10 | Stop reason: HOSPADM

## 2018-07-10 RX ORDER — SODIUM CHLORIDE 0.9 % (FLUSH) 0.9 %
3 SYRINGE (ML) INJECTION
Status: DISCONTINUED | OUTPATIENT
Start: 2018-07-10 | End: 2018-07-10 | Stop reason: HOSPADM

## 2018-07-10 RX ORDER — PROPOFOL 10 MG/ML
INJECTION, EMULSION INTRAVENOUS
Status: DISCONTINUED | OUTPATIENT
Start: 2018-07-10 | End: 2018-07-10

## 2018-07-10 RX ORDER — EPINEPHRINE 1 MG/ML
INJECTION INTRAMUSCULAR; INTRAVENOUS; SUBCUTANEOUS
Status: DISCONTINUED | OUTPATIENT
Start: 2018-07-10 | End: 2018-07-10 | Stop reason: HOSPADM

## 2018-07-10 RX ORDER — PROMETHAZINE HYDROCHLORIDE 25 MG/1
25 TABLET ORAL EVERY 6 HOURS PRN
Status: CANCELLED | OUTPATIENT
Start: 2018-07-10

## 2018-07-10 RX ORDER — HYDROCODONE BITARTRATE AND ACETAMINOPHEN 10; 325 MG/1; MG/1
1 TABLET ORAL EVERY 6 HOURS PRN
Status: DISCONTINUED | OUTPATIENT
Start: 2018-07-10 | End: 2018-07-10 | Stop reason: HOSPADM

## 2018-07-10 RX ORDER — LIDOCAINE HCL/PF 100 MG/5ML
SYRINGE (ML) INTRAVENOUS
Status: DISCONTINUED | OUTPATIENT
Start: 2018-07-10 | End: 2018-07-10

## 2018-07-10 RX ORDER — LIDOCAINE HYDROCHLORIDE 10 MG/ML
0.5 INJECTION, SOLUTION EPIDURAL; INFILTRATION; INTRACAUDAL; PERINEURAL ONCE
Status: DISCONTINUED | OUTPATIENT
Start: 2018-07-10 | End: 2018-07-10 | Stop reason: HOSPADM

## 2018-07-10 RX ORDER — MIDAZOLAM HYDROCHLORIDE 1 MG/ML
2 INJECTION INTRAMUSCULAR; INTRAVENOUS ONCE
Status: COMPLETED | OUTPATIENT
Start: 2018-07-10 | End: 2018-07-10

## 2018-07-10 RX ORDER — MUPIROCIN 20 MG/G
OINTMENT TOPICAL
Status: DISCONTINUED | OUTPATIENT
Start: 2018-07-10 | End: 2018-07-10 | Stop reason: HOSPADM

## 2018-07-10 RX ORDER — MIDAZOLAM HYDROCHLORIDE 5 MG/ML
5 INJECTION INTRAMUSCULAR; INTRAVENOUS ONCE AS NEEDED
Status: COMPLETED | OUTPATIENT
Start: 2018-07-10 | End: 2018-07-10

## 2018-07-10 RX ORDER — HYDROMORPHONE HYDROCHLORIDE 2 MG/ML
0.4 INJECTION, SOLUTION INTRAMUSCULAR; INTRAVENOUS; SUBCUTANEOUS EVERY 5 MIN PRN
Status: DISCONTINUED | OUTPATIENT
Start: 2018-07-10 | End: 2018-07-10 | Stop reason: HOSPADM

## 2018-07-10 RX ORDER — FENTANYL CITRATE 50 UG/ML
100 INJECTION, SOLUTION INTRAMUSCULAR; INTRAVENOUS EVERY 5 MIN PRN
Status: COMPLETED | OUTPATIENT
Start: 2018-07-10 | End: 2018-07-10

## 2018-07-10 RX ORDER — ONDANSETRON 2 MG/ML
INJECTION INTRAMUSCULAR; INTRAVENOUS
Status: DISCONTINUED | OUTPATIENT
Start: 2018-07-10 | End: 2018-07-10

## 2018-07-10 RX ORDER — FAMOTIDINE 20 MG/1
20 TABLET, FILM COATED ORAL
Status: COMPLETED | OUTPATIENT
Start: 2018-07-10 | End: 2018-07-10

## 2018-07-10 RX ORDER — ONDANSETRON 2 MG/ML
4 INJECTION INTRAMUSCULAR; INTRAVENOUS EVERY 12 HOURS PRN
Status: CANCELLED | OUTPATIENT
Start: 2018-07-10

## 2018-07-10 RX ORDER — EPHEDRINE SULFATE 50 MG/ML
INJECTION, SOLUTION INTRAVENOUS
Status: DISCONTINUED | OUTPATIENT
Start: 2018-07-10 | End: 2018-07-10

## 2018-07-10 RX ADMIN — CEFAZOLIN SODIUM 2 G: 2 SOLUTION INTRAVENOUS at 11:07

## 2018-07-10 RX ADMIN — FENTANYL CITRATE 25 MCG: 50 INJECTION, SOLUTION INTRAMUSCULAR; INTRAVENOUS at 02:07

## 2018-07-10 RX ADMIN — MIDAZOLAM HYDROCHLORIDE 5 MG: 5 INJECTION, SOLUTION INTRAMUSCULAR; INTRAVENOUS at 07:07

## 2018-07-10 RX ADMIN — DEXAMETHASONE SODIUM PHOSPHATE 4 MG: 4 INJECTION, SOLUTION INTRAMUSCULAR; INTRAVENOUS at 11:07

## 2018-07-10 RX ADMIN — EPHEDRINE SULFATE 10 MG: 50 INJECTION INTRAMUSCULAR; INTRAVENOUS; SUBCUTANEOUS at 11:07

## 2018-07-10 RX ADMIN — ONDANSETRON 4 MG: 2 INJECTION INTRAMUSCULAR; INTRAVENOUS at 11:07

## 2018-07-10 RX ADMIN — FENTANYL CITRATE 100 MCG: 50 INJECTION, SOLUTION INTRAMUSCULAR; INTRAVENOUS at 11:07

## 2018-07-10 RX ADMIN — FENTANYL CITRATE 100 MCG: 50 INJECTION, SOLUTION INTRAMUSCULAR; INTRAVENOUS at 12:07

## 2018-07-10 RX ADMIN — ROPIVACAINE HYDROCHLORIDE: 5 INJECTION, SOLUTION EPIDURAL; INFILTRATION; PERINEURAL at 12:07

## 2018-07-10 RX ADMIN — LIDOCAINE HYDROCHLORIDE 50 MG: 20 INJECTION, SOLUTION INTRAVENOUS at 11:07

## 2018-07-10 RX ADMIN — PROPOFOL 100 MG: 10 INJECTION, EMULSION INTRAVENOUS at 11:07

## 2018-07-10 RX ADMIN — ROPIVACAINE HYDROCHLORIDE 30 ML: 5 INJECTION, SOLUTION EPIDURAL; INFILTRATION; PERINEURAL at 10:07

## 2018-07-10 RX ADMIN — FENTANYL CITRATE 50 MCG: 50 INJECTION, SOLUTION INTRAMUSCULAR; INTRAVENOUS at 12:07

## 2018-07-10 RX ADMIN — HYDROCODONE BITARTRATE AND ACETAMINOPHEN 1 TABLET: 10; 325 TABLET ORAL at 02:07

## 2018-07-10 RX ADMIN — FENTANYL CITRATE 50 MCG: 50 INJECTION, SOLUTION INTRAMUSCULAR; INTRAVENOUS at 01:07

## 2018-07-10 RX ADMIN — EPHEDRINE SULFATE 10 MG: 50 INJECTION INTRAMUSCULAR; INTRAVENOUS; SUBCUTANEOUS at 12:07

## 2018-07-10 RX ADMIN — FENTANYL CITRATE 100 MCG: 50 INJECTION, SOLUTION INTRAMUSCULAR; INTRAVENOUS at 10:07

## 2018-07-10 RX ADMIN — FAMOTIDINE 20 MG: 20 TABLET ORAL at 07:07

## 2018-07-10 RX ADMIN — CARBOXYMETHYLCELLULOSE SODIUM 2 DROP: 2.5 SOLUTION/ DROPS OPHTHALMIC at 11:07

## 2018-07-10 RX ADMIN — PROPOFOL 200 MG: 10 INJECTION, EMULSION INTRAVENOUS at 11:07

## 2018-07-10 RX ADMIN — SODIUM CHLORIDE, SODIUM LACTATE, POTASSIUM CHLORIDE, AND CALCIUM CHLORIDE: 600; 310; 30; 20 INJECTION, SOLUTION INTRAVENOUS at 10:07

## 2018-07-10 RX ADMIN — MUPIROCIN: 20 OINTMENT TOPICAL at 07:07

## 2018-07-10 RX ADMIN — ACETAMINOPHEN 1000 MG: 10 INJECTION, SOLUTION INTRAVENOUS at 11:07

## 2018-07-10 RX ADMIN — FENTANYL CITRATE 50 MCG: 50 INJECTION, SOLUTION INTRAMUSCULAR; INTRAVENOUS at 02:07

## 2018-07-10 RX ADMIN — MIDAZOLAM HYDROCHLORIDE 2 MG: 1 INJECTION INTRAMUSCULAR; INTRAVENOUS at 10:07

## 2018-07-10 RX ADMIN — SODIUM CHLORIDE, SODIUM LACTATE, POTASSIUM CHLORIDE, AND CALCIUM CHLORIDE: 600; 310; 30; 20 INJECTION, SOLUTION INTRAVENOUS at 01:07

## 2018-07-10 NOTE — ANESTHESIA POSTPROCEDURE EVALUATION
"Anesthesia Post Evaluation    Patient: Fredy Taylor Jr.    Procedure(s) Performed: Procedure(s) (LRB):  REPAIR, KNEE, ACL, ARTHROSCOPIC (Right)  CHONDROPLASTY (Right)  SYNOVECTOMY (Right)  INJECTION, STEROID; amniox right knee (Right)    Final Anesthesia Type: general  Patient location during evaluation: PACU  Patient participation: Yes- Able to Participate  Level of consciousness: awake and alert  Post-procedure vital signs: reviewed and stable  Pain management: adequate  Airway patency: patent  PONV status at discharge: No PONV  Anesthetic complications: no      Cardiovascular status: blood pressure returned to baseline  Respiratory status: unassisted  Hydration status: euvolemic  Follow-up not needed.        Visit Vitals  /64   Pulse 86   Temp 36.6 °C (97.8 °F)   Resp 16   Ht 5' 11" (1.803 m)   Wt 106.6 kg (235 lb 0.2 oz)   SpO2 97%   BMI 32.78 kg/m²       Pain/Sharri Score: Pain Assessment Performed: Yes (7/10/2018  3:00 PM)  Presence of Pain: complains of pain/discomfort (7/10/2018  2:43 PM)  Pain Rating Prior to Med Admin: 7 (7/10/2018  2:41 PM)  Pain Rating Post Med Admin: 3 (7/10/2018  3:11 PM)  Sharri Score: 10 (7/10/2018  3:11 PM)      "

## 2018-07-10 NOTE — TRANSFER OF CARE
"Anesthesia Transfer of Care Note    Patient: Fredy Taylor Jr.    Procedure(s) Performed: Procedure(s) (LRB):  REPAIR, KNEE, ACL, ARTHROSCOPIC (Right)  CHONDROPLASTY (Right)  SYNOVECTOMY (Right)  INJECTION, STEROID; amniox right knee (Right)    Patient location: PACU    Anesthesia Type: general    Transport from OR: Transported from OR on 2-3 L/min O2 by NC with adequate spontaneous ventilation    Post pain: adequate analgesia    Post assessment: no apparent anesthetic complications    Post vital signs: stable    Level of consciousness: awake    Nausea/Vomiting: no nausea/vomiting    Complications: none    Transfer of care protocol was followed      Last vitals:   Visit Vitals  BP (!) 143/94 (BP Location: Left arm, Patient Position: Sitting)   Pulse 83   Temp 36.8 °C (98.2 °F) (Oral)   Resp 18   Ht 5' 11" (1.803 m)   Wt 106.6 kg (235 lb 0.2 oz)   SpO2 96%   BMI 32.78 kg/m²     "

## 2018-07-10 NOTE — BRIEF OP NOTE
[facility]    Brief Operative Note     SUMMARY     Surgery Date: 7/10/2018     Surgeon(s) and Role:     * Ryland Coffey MD - Primary    Assisting Surgeon: None    Pre-op Diagnosis:  Rupture of anterior cruciate ligament of right knee, initial encounter [S83.511A]    Post-op Diagnosis:  Post-Op Diagnosis Codes:     * Rupture of anterior cruciate ligament of right knee, initial encounter [S83.511A]    Procedure: Procedure(s) (LRB):  REPAIR, KNEE, ACL, ARTHROSCOPIC (Right)  CHONDROPLASTY (Right)  SYNOVECTOMY (Right)  INJECTION, STEROID; amniox right knee (Right)    Anesthesia: General    Description of the findings of the procedure: See Dictation    Findings/Key Components: proximal ACL tear    Estimated Blood Loss: * No values recorded between 7/10/2018 12:01 PM and 7/10/2018  2:05 PM *         Specimens:   Specimen (12h ago through future)    None          Disposition: Patient tolerated the procedure well and was transferred to PACU in stable condition.      Discharge Note    SUMMARY     Admit Date: 7/10/2018    Discharge Date and Time:   07/10/2018 2:05 PM    Pre-op Diagnosis:  Rupture of anterior cruciate ligament of right knee, initial encounter [S83.511A]    Post-op Diagnosis:  Post-Op Diagnosis Codes:     * Rupture of anterior cruciate ligament of right knee, initial encounter [S83.511A]    Procedure: Procedure(s) (LRB):  REPAIR, KNEE, ACL, ARTHROSCOPIC (Right)  CHONDROPLASTY (Right)  SYNOVECTOMY (Right)  INJECTION, STEROID; amniox right knee (Right)    Hospital Course (synopsis of major diagnoses, care, treatment, and services provided during the course of the hospital stay): Patient underwent outpatient knee surgery and was transferred to PACU in stable condition.  In PACU, patient received appropriate post-operative care and discharged home with plans for physical therapy and follow-up with the operative surgeon.    Diet: Regular       Final Diagnosis: Post-Op Diagnosis Codes:     * Rupture of  "anterior cruciate ligament of right knee, initial encounter [S80.883L]    Disposition: Home or Self Care    Follow Up/Patient Instructions:     Medications:  Reconciled Home Medications:      Medication List      CHANGE how you take these medications    losartan 25 MG tablet  Commonly known as:  COZAAR  Take 1 tablet (25 mg total) by mouth once daily.  What changed:  when to take this        CONTINUE taking these medications    aspirin 325 MG tablet  Take 1 tablet (325 mg total) by mouth once daily.     buPROPion 100 MG tablet  Commonly known as:  WELLBUTRIN  Take 20 mg by mouth once daily.     celecoxib 200 MG capsule  Commonly known as:  CeleBREX  Take 1 capsule (200 mg total) by mouth 2 (two) times daily.     clotrimazole-betamethasone 1-0.05% cream  Commonly known as:  LOTRISONE  Apply topically 2 (two) times daily.     fluticasone 50 mcg/actuation nasal spray  Commonly known as:  FLONASE  1 spray by Each Nare route once daily.     HYDROcodone-acetaminophen  mg per tablet  Commonly known as:  NORCO  Take 1 tablet by mouth every 6 (six) hours as needed for Pain.     promethazine 25 MG tablet  Commonly known as:  PHENERGAN  Take 1 tablet (25 mg total) by mouth every 6 (six) hours as needed for Nausea.            Discharge Procedure Orders  CRUTCHES FOR HOME USE   Order Comments: Provide if needed   Order Specific Question Answer Comments   Type: Axillary    Height: 5' 11" (1.803 m)    Weight: 106.6 kg (235 lb 0.2 oz)    Does patient have medical equipment at home? none    Length of need (1-99 months): 24      Diet general     Call MD for:  temperature >100.4     Call MD for:  persistent nausea and vomiting     Call MD for:  severe uncontrolled pain     Call MD for:  difficulty breathing, headache or visual disturbances     Call MD for:  redness, tenderness, or signs of infection (pain, swelling, redness, odor or green/yellow discharge around incision site)     Call MD for:  hives     Call MD for:  persistent " dizziness or light-headedness     Remove dressing in 72 hours     Weight bearing as tolerated   Order Comments: WBAT RLE with knee locked in extension.       Follow-up Information     Ryland Coffey MD.    Specialties:  Sports Medicine, Orthopedic Surgery  Why:  as scheduled pre op  Contact information:  [address] [place] [address]  [phone]

## 2018-07-10 NOTE — ANESTHESIA PROCEDURE NOTES
Peripheral    Patient location during procedure: holding area   Block not for primary anesthetic.  Reason for block: at surgeon's request and post-op pain management   Post-op Pain Location: knee pain  Timeout: 7/10/2018 10:23 AM   End time: 7/10/2018 10:31 AM  Staffing  Anesthesiologist: CORAZON ALARCON  Peripheral Block  Patient position: supine  Prep: ChloraPrep  Patient monitoring: heart rate, cardiac monitor, continuous pulse ox and frequent blood pressure checks  Block type: adductor canal  Laterality: right  Injection technique: single shot  Needle  Needle localization: ultrasound guidance   -ultrasound image captured on disc.  Assessment  Injection assessment: local visualized surrounding nerve, negative parasthesia and negative aspiration  Paresthesia pain: none  Heart rate change: no  Slow fractionated injection: yes  Medications:  Bolus administered: 30 mL of 0.5 ropivacaine  Epinephrine added: none

## 2018-07-10 NOTE — OR NURSING
Reviewed   knee arthroscopy discharge instructions and demonstrated the postoperative equipment (polar ice and BREG t-scope knee brace), with verbalized understanding per patient.  The patient has crutches at home and states he's experienced with the use of crutches.                        .

## 2018-07-10 NOTE — H&P (VIEW-ONLY)
Fredy SANTOS Claudia Santana  is here for a completion of his perioperative paperwork. he  Is scheduled to undergo   1. Right knee arthroscopic ACL repair versus reconstruction  2. Right knee arthroscopic chondroplasty  3. Right knee arthroscopic synovectomy  4. Right knee Amniox arthrocentesis 96614 on 7/10/18.  He is a healthy individual and does not need clearance for this procedure.     Risks, indications and benefits of the surgical procedure were discussed with the patient. All questions with regard to surgery, rehab, expected return to functional activities, activities of daily living and recreational endeavors were answered to his satisfaction.    Once no other questions were asked, a brief history and physical exam was then performed.      PAST MEDICAL HISTORY:   Past Medical History:   Diagnosis Date    Elevated cholesterol     Hay fever     Hypertension      PAST SURGICAL HISTORY:   Past Surgical History:   Procedure Laterality Date    VASECTOMY       FAMILY HISTORY:   Family History   Problem Relation Age of Onset    Heart disease Father     Cancer Father 65        prostate    Hypertension Mother     Cancer Sister         female     SOCIAL HISTORY:   Social History     Social History    Marital status: Single     Spouse name: N/A    Number of children: N/A    Years of education: N/A     Occupational History     [employer]     Social History Main Topics    Smoking status: Current Every Day Smoker     Packs/day: 1.00     Years: 30.00     Types: Cigarettes     Last attempt to quit: 2/9/2013    Smokeless tobacco: Never Used      Comment: 15 cigarettes a day    Alcohol use Yes      Comment: yesterday    Drug use: No    Sexual activity: Not on file     Other Topics Concern    Not on file     Social History Narrative    No narrative on file       MEDICATIONS:   Current Outpatient Prescriptions:     buPROPion (WELLBUTRIN) 100 MG tablet, Take 20 mg by mouth once daily., Disp: , Rfl:      celecoxib (CELEBREX) 200 MG capsule, Take 1 capsule (200 mg total) by mouth 2 (two) times daily., Disp: 60 capsule, Rfl: 2    clotrimazole-betamethasone 1-0.05% (LOTRISONE) cream, Apply topically 2 (two) times daily., Disp: 45 g, Rfl: 0    fluticasone (FLONASE) 50 mcg/actuation nasal spray, 1 spray by Each Nare route once daily., Disp: , Rfl:     losartan (COZAAR) 25 MG tablet, Take 1 tablet (25 mg total) by mouth once daily., Disp: 30 tablet, Rfl: 6  ALLERGIES:   Review of patient's allergies indicates:   Allergen Reactions    Percocet [oxycodone-acetaminophen] Hives       VITAL SIGNS: There were no vitals taken for this visit.       PHYSICAL EXAM:  GEN: A&Ox3, WD WN NAD  HEENT: WNL  CHEST: CTAB, no W/R/R  HEART: RRR, no M/R/G  ABD: Soft, NT ND, BS x4 QUADS  MS; See Epic  NEURO: CN II-XII intact       The surgical consent was then reviewed with the patient, who agreed with all the contents of the consent form and it was signed. he was then given the [facility] surgery packet to bring with him to [facility] for the anesthesia portion of his perioperative paperwork.     PHYSICAL THERAPY:  He was also instructed regarding physical therapy and will begin on  POD 3.     POST OP CARE:instructions were reviewed including care of the wound and dressing after surgery and when he can shower.     PAIN MANAGEMENT: Fredy Taylor Jr. was also given  pain management regime, which includes the TENS unit given to him by Faraz Perez along with the education required for its use. He was also instructed regarding the Polar ice unit that will be in place after surgery and his postoperative pain medications.     PAIN MEDICATION:  Hydrocodone 10/325mg 1 po q 4-6 hours prn pain  Ultram 50 mg one p.o. q.4-6 hours p.r.n. breakthrough pain,   Phenergan 25 mg one p.o. q.4-6 hours p.r.n. nausea and vomiting.    As there were no other questions to be asked, he was given my business card along with Ryland Coffey MD business card if he has any  questions or concerns prior to surgery or in the postop period.

## 2018-07-10 NOTE — DISCHARGE INSTRUCTIONS
Anesthesia: After Your Surgery  Youve just had surgery. During surgery, you received medication called anesthesia to keep you comfortable and pain-free. After surgery, you may experience some pain or nausea. This is common. Here are some tips for feeling better and recovering after surgery.    Going home  Your doctor or nurse will show you how to take care of yourself when you go home. He or she will also answer your questions. Have an adult family member or friend drive you home. For the first 24 hours after your surgery:  · Do not drive or use heavy equipment.  · Do not make important decisions or sign legal documents.  · Avoid alcohol.  · Have someone stay with you, if needed. He or she can watch for problems and help keep you safe.  Be sure to keep all follow-up appointments with your doctor. And rest after your procedure for as long as your doctor tells you to.    Coping with pain  If you have pain after surgery, pain medication will help you feel better. Take it as directed, before pain becomes severe. Also, ask your doctor or pharmacist about other ways to control pain, such as with heat, ice, and relaxation. And follow any other instructions your surgeon or nurse gives you.    Tips for taking pain medication  To get the best relief possible, remember these points:  · Pain medications can upset your stomach. Taking them with a little food may help.  · Most pain relievers taken by mouth need at least 20 to 30 minutes to take effect.  · Taking medication on a schedule can help you remember to take it. Try to time your medication so that you can take it before beginning an activity, such as dressing, walking, or sitting down for dinner.  · Constipation is a common side effect of pain medications. Contact your doctor before taking any medications like laxatives or stool softeners to help relieve constipation. Also ask about any dietary restrictions, because drinking lots of fluids and eating foods like fruits  and vegetables that are high in fiber can also help. Remember, dont take laxatives unless your surgeon has prescribed them.  · Mixing alcohol and pain medication can cause dizziness and slow your breathing. It can even be fatal. Dont drink alcohol while taking pain medication.  · Pain medication can slow your reflexes. Dont drive or operate machinery while taking pain medication.  If your health care provider tells you to take acetaminophen to help relieve your pain, ask him or her how much you are supposed to take each day. (Acetaminophen is the generic name for Tylenol and other brand-name pain relievers.) Acetaminophen or other pain relievers may interact with your prescription medicines or other over-the-counter (OTC) drugs. Some prescription medications contain acetaminophen along with other active ingredients. Using both prescription and OTC acetaminophen for pain can cause you to overdose. The FDA recommends that you read the labels on your OTC medications carefully. This will help you to clearly understand the list of active ingredients, dosing instructions, and any warnings. It may also help you avoid taking too much acetaminophen. If you have questions or don't understand the information, ask your pharmacist or health care provider to explain it to you before you take the OTC medication.    Managing nausea  Some people have an upset stomach after surgery. This is often due to anesthesia, pain, pain medications, or the stress of surgery. The following tips will help you manage nausea and get good nutrition as you recover. If you were on a special diet before surgery, ask your doctor if you should follow it during recovery. These tips may help:  · Dont push yourself to eat. Your body will tell you when to eat and how much.  · Start off with clear liquids and soup. They are easier to digest.  · Progress to semi-solid foods (mashed potatoes, applesauce, and gelatin) as you feel ready.  · Slowly move to  solid foods. Dont eat fatty, rich, or spicy foods at first.  · Dont force yourself to have three large meals a day. Instead, eat smaller amounts more often.  · Take pain medications with a small amount of solid food, such as crackers or toast to avoid nausea.      Call your surgeon if  · You still have pain an hour after taking medication (it may not be strong enough).  · You feel too sleepy, dizzy, or groggy (medication may be too strong).  · You have side effects like nausea, vomiting, or skin changes (rash, itching, or hives).   © 1651-0724 [employer]. [address] [place] [address], [place] [address]. All rights reserved. This information is not intended as a substitute for professional medical care. Always follow your healthcare professional's instructions.                     [facility] [address], [place]                                                                                          [phone]                   Postoperative Instructions for Knee Surgery                 Your Surgery Included:   Open               Arthroscopic   [] Ligament Repair       [] Diagnostic           [] ACL     [] PCL     [] MCL     [] PLLC      [] Synovectomy / Plica Removal [] Meniscal Cartilage Repair / Transplantation      [] Lysis of Adhesions / Manipulation [] Articular Cartilage Repair      [] Interval Release           [] Microfracture       [] OATS   [] ACI      [] Meniscectomy           [] Osteochondral Allograft      [] Meniscal Cartilage Repair  [] Patellar Realignment       [] Debridement / Chondroplasty         [] Lateral Release   [] Ligament Repair      [] Articular Cartilage Repair          [] Extensor Mechanism             []   Microfracture  []  OATS         []  Cartilage Biopsy [] Tendon Repair          [x] Ligament Repair          [] Patella                  [] Quadriceps             [x]   ACL    []   PCL  [] High Tibial Osteotomy       [] PRP Arthrocentesis  [] Joint  Replacement         [x] Amniox Arthrocentesis           [] Unicompartmental   [] Patellofemoral                    [] Total Knee                  Call our office ([phone]) immediately if you experience any of the following:       Excessive bleeding or pus like drainage at the incision site       Uncontrollable pain not relieved by pain medication       Excessive swelling or redness at the incision site       Fever above 101.5 degrees not controlled with Tylenol or Motrin       Shortness of Breath       Any foul odor or blistering from the surgery site    FOR EMERGENCIES: If any unusual problems or difficulties occur, call our office at [phone], or page the  at [phone] who will direct your call appropriately    1.   Pain Management: A cold therapy cuff, pain medications, local injections, and in some cases, regional anesthesia injections are used to manage your post-operative pain. The decision to use each of these options is based on their risks and benefits.     Medications: You were given one or more of the following medication prescriptions before leaving the hospital. Have the prescriptions filled at a pharmacy on your way home and follow the instructions on the bottles. If you need a refill, please call your pharmacy.      Narcotic Medication (usually Vicodin ES, Lortab, Percocet or Nucynta): Begin taking the medication before your knee starts to hurt. Some patients do not like to take any medication, but if you wait until your pain is severe before taking, you will be very uncomfortable for several hours waiting for the narcotic to work. Always take with food.     Nausea / Vomiting: For this issue, we prescribe Phenergan, use this medication as directed.     Cold Therapy: You may have been sent home with a [facility] cold therapy unit and wrap for your knee. Fill with ice and water to the indicated fill line and use throughout the day for the first two days and then as needed to  help relieve pain and control swelling.      Regional Anesthesia Injections (Blocks): You may have been given a regional nerve block either before or after surgery. This may make your entire leg numb for 24-36 hours.                            * Proceed with caution when bearing weight on your leg.     2.   Diet: Eat a bland diet for the first day after surgery. Progress your diet as tolerated. Constipation may occur with Narcotic usage, contact our office if you are experiencing constipation.    3.   Activity: Limit your activity during the first 48 hours, keep your leg elevated with pillows under your heel. After the first 48 hours at home, increase your activity level based on your symptoms.    4.   Dressing Change: Remove the dressing on the 3rd day. It is normal for some blood to be seen on the dressings. It is also normal for you to see apparent bruising on the skin around your knee when you remove the dressing. If present, leave the steri-strip tape across the incisions. If you are concerned by the drainage or the appearance of your knee, please call one of the numbers listed below.    5.   Showering: You may shower on the 3rd day after surgery with waterproof bandages over small incisions. If you have an incision with Prineo (clear mesh), it does not need to be covered. Do not submerge in any water until after your postoperative appointment in clinic.    6.   Knee Brace: You may have been sent home in a hinged knee brace. Your brace is set at 0 to 0 degrees of motion. Wear the brace for 6 weeks, LOCKED in full extension when walking, you will need to wear this brace at all time unless instructed otherwise. You may unlock at rest or for exercises.    7.   Your procedure did not require a Continuous Passive Motion (CPM) device.    8.   Weight Bearing: You may have been sent home with crutches. If instructed (see below), use these crutches at all times unless at complete rest.      [] Non-weight bearing for      " weeks (you may touch your toes to the floor)      [] Partial weight bearing for   weeks    [] 25% Body Weight   [] 50% Body Weight      [x] Full weight bearing            [x]  NOW    []  after  weeks     9.  Knee Exercises: Begin these exercises the first day after surgery in order to help you regain your motion and strength. You may do the following marked exercises:     [] Quad Sets - Begin activating your quadriceps muscle by driving your          knee downward into full knee extension while seated on a table or bed   with a towel rolled and propped under your heel     [] Straight Leg Raise (SLR) - While octavia your quadriceps muscle, lift     your fully extended leg to the level of your non-operative knee (as shown)     [] Heel Slides - With the knee straight, slide your heel slowly toward your   buttocks, hold at the endpoint for 10-15 seconds, then slowly straighten     [x] Ankle pumps - With your knee straight, move your ankle in a "pumping"    fashion to activate your calf and leg muscles      10.  Physical Therapy: Physical therapy is an essential component to your recovery from surgery. Your physical therapy will start in 5 days.    FIRST POSTOPERATIVE VISIT: As scheduled.       "

## 2018-07-11 ENCOUNTER — TELEPHONE (OUTPATIENT)
Dept: SPORTS MEDICINE | Facility: CLINIC | Age: 48
End: 2018-07-11

## 2018-07-11 NOTE — OP NOTE
DATE OF PROCEDURE:  07/10/2018    ATTENDING SURGEON:  Ryland Coffey M.D.    FIRST ASSISTANT:  Crow Lozada M.D. (RES)    SECOND ASSISTANT:  SMA Zach-assistant.    PREOPERATIVE DIAGNOSIS:  Right knee anterior cruciate ligament tear.    POSTOPERATIVE DIAGNOSES:  1.  Right knee anterior cruciate ligament tear (proximal avulsion).  2.  Right knee synovitis.  3.  Right knee chondromalacia.    OPERATIVE PROCEDURES PERFORMED:  1.  Right knee anterior cruciate ligament repair/augmentation, CPT code 21956.  2.  Right knee arthroscopic synovectomy, CPT code 39320.  3.  Right knee arthroscopic chondroplasty, CPT code 22501.  4.  Right knee has arthrocentesis.  CPT code 27312.    ANESTHESIA:  Adductor block, general endotracheal intubation and local   anesthetics using 30 mL of 0.5% ropivacaine mixture.    FLUIDS IN THE CASE:  1 L.    COMPLICATIONS:  None.    CONDITION ON RETURN TO RECOVERY ROOM:  Stable.    IMPLANTS UTILIZED:  Arthrex 4.75 x 19.1 mm SwiveLock, Arthrex 4.75 x 19.1 mm   SwiveLock, Clarix ANJEL Arthrex ANJEL 100 injectable amniotic fluid, Arthrex   internal brace.    INDICATIONS FOR OPERATIVE PROCEDURE:  Fredy Taylor is a 47-year-old male with   history of right knee pain and pathology following a traumatic event.  He was   noted to have a proximal ACL avulsion by MRI and was felt to be an appropriate   candidate for arthroscopic repair of the ACL tear based on the proximal nature   of that damage.  In addition, the patient was given the appropriate   postoperative rehabilitation following this type repair.    DESCRIPTION OF PROCEDURE:  The patient was brought into the Operating Room,   placed in supine position.  Upon application of adductor block in the   preoperative hold area, the patient underwent general sedation and placement of   an endotracheal tube to stabilize the airway.  The patient was given the   appropriate dose of antibiotics based on body weight.  Time-out was utilized to   verify  right side as the operative site.  Both upper extremities were placed in   comfortable position.  Right leg was then prepped and draped in a sterile   fashion with ChloraPrep material with a bump under the right hip and popliteal   post along the right side of the table as well as lateral post along the right   side of the table.  Left leg was carefully padded along the heel and peroneal   nerve regions.  Once prepped and draped in sterile fashion, right leg was   examined under anesthesia revealing positive pivot shift at IIIB Lachman's   maneuver.  The patient demonstrated no signs of significant rotational   instability.    Knee was taken into flexion and we instilled 0.5% ropivacaine mixture into the   anterolateral and anteromedial aspects of the knee.  A #11 blade was used to   make these portals.  Blunt trocar and sheath was inserted into the intercondylar   notch of the suprapatellar pouch.  This area was visualized, demonstrating   intact patellofemoral articular cartilage.  Pictures were obtained.  Normal   patellar tracking was noted.  Attention was then turned to the intercondylar   notch demonstrating the proximal ACL avulsion as previously noted.  Posterior   cruciate ligament structure was found to be intact.  Attention was then turned   to the medial and lateral compartments, demonstrating intact medial and lateral   meniscus structures.  These areas were probed and found to be stable, no signs   of occult tears.    With knee in flexion and slight figure-of-four position, we debrided the   anterolateral aspect of the intercondylar notch.  We saved the stump of the ACL   structure.    We placed a series of four #2 FiberLoop sutures from Arthrex through the ACL   stump with the Scorpion device.  Sutures were then passed through the passport   device to widen the anteromedial and central patellar tendon portals.  We then   tapped the anterolateral aspect of the intercondylar notch and placed the    Arthrex internal brace through the eyelet of the SwiveLock.  We then passed the   sutures through the eyelet of the SwiveLock device as well, entering the knee in   90 degrees flexion.  SwiveLock device was then deployed capturing both the   proximal fixation of the internal brace as well as the sutures which had been   placed through the ACL stump to augment and repair the proximal origin of the   ACL tissue.  The knee was then taken into extension.  We placed it approximately   30 degrees flexion and pulled distal tension, we then placed the drill guide   through the anteromedial portal into the stump of the ACL tissue and placed a   guide pin.  This was then used to create the tunnel through which we pulled the   internal brace directly through the stump of the ACL tissue.  The knee was then   taken to extension and with the knee in full extension we then pulled tension   distally on the internal brace.    A small incision was made along the anteromedial aspect of the tibia.  It was   carried down to skin down fat and fascia.  We then drilled and tapped the area   of concern and then placed the distal portion of the internal brace, which had   been passed through the ACL stump through the eyelet of the distal SwiveLock   device.  This was then deployed with distal tension applied and posterior   directed force applied to the tibia.  The excellent fixation was achieved.    Final pictures were obtained of the repaired ACL structure intraarticularly   demonstrating normal anatomy.  Pictures were obtained.  Stability was assessed   demonstrating a negative Lachman maneuver.  Range of motion was gently assessed   from 0-90 degrees and felt to be stable.  As a result, irrigation was performed   copiously along the area of concern.  Fluid was extravasated from the knee.    Nylon sutures were used to close the arthroscopic portals and incisional   regions.  We then aspirated the knee.  We then injected the knee with 3  mL of   amniotic fluid for postoperative augmentation of repair.  The area was then   assessed.  Xeroform was applied along with application of gauze, ABD pads, cast   padding, sterile electrode pads proximally and distally, a long-leg LYNDSEY hose   stocking and cooling unit.  The patient's knee was placed into a hinged knee   immobilizer  which was locked in -10 degrees hyperextension and allowed to be   flexed at rest at 30 degrees.  The patient was allowed to recover from the   anesthetic, was extubated and was taken to Recovery Room in stable condition.    At the completion of the case, all instrument and sponge counts were correct.    NOTE:  Dr. Ryland Coffey was present for the key portions of the procedure and did   perform the key portions of the procedure.    PHYSICAL THERAPY:  The patient should be held out of physical therapy for the   first 1 to 2 weeks following the surgery.    AMBULATION AND WEIGHTBEARING STATUS:  The patient should be full weightbearing   as tolerated with the right knee in a hinged knee immobilizer locked in -10   degrees hyperextension with gait.    RANGE OF MOTION:  No range of motion for the first 2 weeks following surgery.    Initiating range of motion from 0 to 30 degrees at 2 weeks and 0-45 degrees at 3   weeks and 0 and 90 degrees at 5 weeks and full range of motion after 6 weeks   postoperative rehabilitation.      RALPH  dd: 07/10/2018 22:45:31 (CDT)  td: 07/11/2018 02:41:31 (CDT)  Doc ID   #[id number]  Job ID #[id number]    CC: [facility]     Dictation # [id number]

## 2018-07-11 NOTE — TELEPHONE ENCOUNTER
----- Message from Inés Maldonado RN sent at 7/11/2018  2:59 PM CDT -----  The patient is asking, when is his first physical therapy visit.  (Postoperative phone call)

## 2018-07-11 NOTE — TELEPHONE ENCOUNTER
Spoke with the patient. He needs to hold PT for 2 weeks. She will see us on Wednesday and Dr. Coffey will give him an update on that appointment.

## 2018-07-18 ENCOUNTER — OFFICE VISIT (OUTPATIENT)
Dept: SPORTS MEDICINE | Facility: CLINIC | Age: 48
End: 2018-07-18
Payer: COMMERCIAL

## 2018-07-18 VITALS
SYSTOLIC BLOOD PRESSURE: 145 MMHG | DIASTOLIC BLOOD PRESSURE: 84 MMHG | HEIGHT: 71 IN | WEIGHT: 235 LBS | BODY MASS INDEX: 32.9 KG/M2 | HEART RATE: 108 BPM

## 2018-07-18 DIAGNOSIS — M25.561 ACUTE PAIN OF RIGHT KNEE: Primary | ICD-10-CM

## 2018-07-18 DIAGNOSIS — M23.91 ACUTE INTERNAL DERANGEMENT OF RIGHT KNEE: ICD-10-CM

## 2018-07-18 DIAGNOSIS — S83.511D RUPTURE OF ANTERIOR CRUCIATE LIGAMENT OF RIGHT KNEE, SUBSEQUENT ENCOUNTER: ICD-10-CM

## 2018-07-18 PROCEDURE — 99024 POSTOP FOLLOW-UP VISIT: CPT | Mod: S$GLB,,, | Performed by: ORTHOPAEDIC SURGERY

## 2018-07-18 PROCEDURE — 99999 PR PBB SHADOW E&M-EST. PATIENT-LVL III: CPT | Mod: PBBFAC,,, | Performed by: ORTHOPAEDIC SURGERY

## 2018-07-18 RX ORDER — SULFAMETHOXAZOLE AND TRIMETHOPRIM 800; 160 MG/1; MG/1
1 TABLET ORAL 2 TIMES DAILY
Qty: 20 TABLET | Refills: 0 | Status: ON HOLD | OUTPATIENT
Start: 2018-07-18 | End: 2023-11-09 | Stop reason: HOSPADM

## 2018-07-18 NOTE — PROGRESS NOTES
Subjective:          Chief Complaint: Fredy Taylor Jr. is a 47 y.o. male who had concerns including Post-op Evaluation of the Right Knee.    Pt here for follow up right knee    DATE OF PROCEDURE:  07/10/2011     ATTENDING SURGEON:  Ryland Coffey M.D.     FIRST ASSISTANT:  Crow Lozada M.D. (RES)     SECOND ASSISTANT:  SMA Zach-assistant.     PREOPERATIVE DIAGNOSIS:  Right knee anterior cruciate ligament tear.     POSTOPERATIVE DIAGNOSES:  1.  Right knee anterior cruciate ligament tear (proximal avulsion).  2.  Right knee synovitis.  3.  Right knee chondromalacia.     OPERATIVE PROCEDURES PERFORMED:  1.  Right knee anterior cruciate ligament repair/augmentation, CPT code 71778.  2.  Right knee arthroscopic synovectomy, CPT code 86291.  3.  Right knee arthroscopic chondroplasty, CPT code 30213.  4.  Right knee has arthrocentesis.  CPT code 47219.               Review of Systems   Constitution: Negative. Negative for chills, fever, weight gain and weight loss.   HENT: Negative for congestion and sore throat.    Eyes: Negative for blurred vision and double vision.   Cardiovascular: Negative for chest pain, leg swelling and palpitations.   Respiratory: Negative for cough and shortness of breath.    Hematologic/Lymphatic: Does not bruise/bleed easily.   Skin: Negative for itching, poor wound healing and rash.   Musculoskeletal: Positive for joint pain, joint swelling and stiffness. Negative for back pain, muscle weakness and myalgias.   Gastrointestinal: Negative for abdominal pain, constipation, diarrhea, nausea and vomiting.   Genitourinary: Negative.  Negative for frequency and hematuria.   Neurological: Negative for dizziness, headaches, numbness, paresthesias and sensory change.   Psychiatric/Behavioral: Negative for altered mental status and depression. The patient is not nervous/anxious.    Allergic/Immunologic: Negative for hives.       Pain Related Questions  Over the past 3 days, what was  your average pain during activity? (I.e. running, jogging, walking, climbing stairs, getting dressed, ect.): 10  Over the past 3 days, what was your highest pain level?: 10  Over the past 3 days, what was your lowest pain level? : 3    Other  How many nights a week are you awakened by your affected body part?: 7  Was the patient's HEIGHT measured or patient reported?: Patient Reported  Was the patient's WEIGHT measured or patient reported?: Measured      Objective:        General: Fredy is well-developed, well-nourished, appears stated age, in no acute distress, alert and oriented to time, place and person.     General    Vitals reviewed.  Constitutional: He is oriented to person, place, and time. He appears well-developed and well-nourished. No distress.   HENT:   Head: Normocephalic and atraumatic.   Mouth/Throat: No oropharyngeal exudate.   Eyes: EOM are normal. Right eye exhibits no discharge. Left eye exhibits no discharge.   Neck: Normal range of motion.   Cardiovascular: Normal rate and regular rhythm.    Pulmonary/Chest: Effort normal and breath sounds normal. No respiratory distress.   Neurological: He is alert and oriented to person, place, and time. He has normal reflexes. No cranial nerve deficit. Coordination normal.   Psychiatric: He has a normal mood and affect. His behavior is normal. Judgment and thought content normal.     General Musculoskeletal Exam   Gait: antalgic and abnormal       Right Knee Exam     Inspection   Erythema: absent  Scars: present  Swelling: present  Effusion: present  Deformity: deformity  Bruising: absent    Tenderness   The patient is tender to palpation of the medial joint line, lateral joint line and medial hamstring.    Range of Motion   Extension:  0 normal   Flexion: normal     Tests   Meniscus   Mikel:  Medial - positive Lateral - positive  Ligament Examination Lachman: normal (-1 to 2mm) PCL-Posterior Drawer: normal (0 to 2mm)     MCL - Valgus: normal (0 to  2mm)  LCL - Varus: normalReverse Pivot Shift: normal (Equal)Dial Test at 30 degrees: normal (< 5 degrees)Dial Test at 90 degrees: normal (< 5 degrees)  Posterior Sag Test: negative  Posterolateral Corner: unstable (>15 degrees difference)  Patella   Patellar Apprehension: negative  Passive Patellar Tilt: neutral  Patellar Tracking: normal  Patellar Glide (quadrants): Lateral - 1   Medial - 2  Q-Angle at 90 degrees: normal  Patellar Grind: negative  J-Sign: none    Other   Meniscal Cyst: absent  Popliteal (Baker's) Cyst: absent  Muscle Tightness: hamstring tightness  Sensation: normal    Left Knee Exam     Inspection   Erythema: absent  Scars: absent  Swelling: absent  Effusion: absent  Deformity: deformity  Bruising: absent    Tenderness   The patient is experiencing no tenderness.         Range of Motion   Extension:  0 normal   Flexion:  140 normal     Tests   Meniscus   Mikel:  Medial - negative Lateral - negative  Stability Lachman: normal (-1 to 2mm) PCL-Posterior Drawer: normal (0 to 2mm)  MCL - Valgus: normal (0 to 2mm)  LCL - Varus: normal (0 to 2mm)Pivot Shift: normal (Equal)Reverse Pivot Shift: normal (Equal)Dial Test at 30 degrees: normal (< 5 degrees)Dial Test at 90 degrees: normal (< 5 degrees)  Posterior Sag Test: negative  Posterolateral Corner: unstable (>15 degrees difference)  Patella   Patellar Apprehension: negative  Passive Patellar Tilt: neutral  Patellar Tracking: normal  Patellar Glide (Quadrants): Lateral - 1 Medial - 2  Q-Angle at 90 degrees: normal  Patellar Grind: negative  J-Sign: J sign absent    Other   Meniscal Cyst: absent  Popliteal (Baker's) Cyst: absent  Sensation: normal    Right Hip Exam     Tests   Justine: negative  Left Hip Exam     Tests   Justine: negative          Muscle Strength   Right Lower Extremity   Hip Abduction: 5/5   Quadriceps:  5/5 and 4/5   Hamstrin/5 and 4/5   Left Lower Extremity   Hip Abduction: 5/5   Quadriceps:  5/5   Hamstrin/5     Reflexes      Left Side  Quadriceps:  2+  Achilles:  2+    Right Side   Quadriceps:  2+  Achilles:  2+    Vascular Exam     Right Pulses  Dorsalis Pedis:      2+  Posterior Tibial:      2+        Left Pulses  Dorsalis Pedis:      2+  Posterior Tibial:      2+          RADIOGRAPHS:  FINDINGS:  No acute displaced fracture or dislocation of the knee.  There is a minimal suprapatellar effusion.  No radiopaque foreign body.      MRI:    Narrative     EXAMINATION:  MRI KNEE WITHOUT CONTRAST RIGHT    CLINICAL HISTORY:  Knee pain, persistent, >=6wks;Knee pain, xray neg / effusion;Meniscus tear suspected;rule out meniscus tear/ ACL tear;Effusion, right knee    TECHNIQUE:  Multiplanar, multisequence images were performed about the knee.    COMPARISON:  04/22/2018    FINDINGS:  Menisci:    --Medial: Intact    --Lateral: Intact    Ligaments:  ACL demonstrates complete tear at the proximal attachment.  PCL demonstrates interstitial tear.  MCL, and LCL complex are intact.    Tendons:  Extensor mechanism is maintained.    Cartilage:    Patellofemoral: Full-thickness fissure of the lateral facet with subchondral cystic change.    Medial tibiofemoral: Partial-thickness fissuring with small focus of subchondral edema at the posterior weight-bearing condyle.    Lateral tibiofemoral: Articular cartilage is maintained.    Bone: Prominent lateral tibial plateau edema with 16 x 9 mm subchondral fracture posteriorly.    Miscellaneous: Small joint effusion.   Impression       1. Complete proximal ACL tear.  2. Subchondral fracture of posterior lateral tibial plateau.  3. Mild patellofemoral and medial tibiofemoral cartilage loss.             Assessment:       Encounter Diagnoses   Name Primary?    Acute pain of right knee Yes    Acute internal derangement of right knee     Rupture of anterior cruciate ligament of right knee, subsequent encounter           Plan:       1. IKDC, SF-12 and KOOS was not filled out today in clinic.     RTC in 1 weeks  with Mid-level provider. Patient will not fill out IKDC, SF-12 and KOOS on return.    2. Cont PT per protocol    3. Bactrim for knee erythema    4. Sutures out in 1 week    5. Immobilizer locked straight for ambulation and during sleep; okay to unlock brace at rest.    6. Okay for sedentary work in 1 week              Patient questionnaires may have been collected.

## 2018-07-25 ENCOUNTER — OFFICE VISIT (OUTPATIENT)
Dept: SPORTS MEDICINE | Facility: CLINIC | Age: 48
End: 2018-07-25
Payer: COMMERCIAL

## 2018-07-25 ENCOUNTER — HOSPITAL ENCOUNTER (OUTPATIENT)
Dept: RADIOLOGY | Facility: HOSPITAL | Age: 48
Discharge: HOME | End: 2018-07-25
Attending: PHYSICIAN ASSISTANT
Payer: COMMERCIAL

## 2018-07-25 VITALS
SYSTOLIC BLOOD PRESSURE: 163 MMHG | WEIGHT: 235 LBS | HEART RATE: 99 BPM | DIASTOLIC BLOOD PRESSURE: 84 MMHG | BODY MASS INDEX: 32.9 KG/M2 | HEIGHT: 71 IN

## 2018-07-25 DIAGNOSIS — Z98.890 S/P ACL SURGERY: ICD-10-CM

## 2018-07-25 DIAGNOSIS — Z98.890 S/P ACL SURGERY: Primary | ICD-10-CM

## 2018-07-25 PROCEDURE — 73562 X-RAY EXAM OF KNEE 3: CPT | Mod: 26,RT,, | Performed by: RADIOLOGY

## 2018-07-25 PROCEDURE — 99999 PR PBB SHADOW E&M-EST. PATIENT-LVL IV: CPT | Mod: PBBFAC,,, | Performed by: PHYSICIAN ASSISTANT

## 2018-07-25 PROCEDURE — 73562 X-RAY EXAM OF KNEE 3: CPT | Mod: TC,FY,PO,RT

## 2018-07-25 PROCEDURE — 99024 POSTOP FOLLOW-UP VISIT: CPT | Mod: S$GLB,,, | Performed by: PHYSICIAN ASSISTANT

## 2018-07-25 NOTE — PROGRESS NOTES
Subjective:          Chief Complaint: Fredy Taylor Jr. is a 47 y.o. male who had concerns including Follow-up of the Right Knee.    Patient is here for follow up right knee. Pain today is 2/10. Denies nausea, vomiting, fever, chills. He has not been taking the Norco anymore. He has not started PT yet. He has been taking Bactrim x 1 week and has 3 days left. Ambulating with T-scope brace locked out in extension.    DATE OF PROCEDURE:  07/10/2011     ATTENDING SURGEON:  Ryland Coffey M.D.     FIRST ASSISTANT:  Crow Lozada M.D. (RES)     SECOND ASSISTANT:  SMA Zach-assistant.     PREOPERATIVE DIAGNOSIS:  Right knee anterior cruciate ligament tear.     POSTOPERATIVE DIAGNOSES:  1.  Right knee anterior cruciate ligament tear (proximal avulsion).  2.  Right knee synovitis.  3.  Right knee chondromalacia.     OPERATIVE PROCEDURES PERFORMED:  1.  Right knee anterior cruciate ligament repair/augmentation, CPT code 63199.  2.  Right knee arthroscopic synovectomy, CPT code 14570.  3.  Right knee arthroscopic chondroplasty, CPT code 11176.  4.  Right knee has arthrocentesis.  CPT code 20217.               Review of Systems   Constitution: Negative. Negative for chills, fever, weight gain and weight loss.   HENT: Negative for congestion and sore throat.    Eyes: Negative for blurred vision and double vision.   Cardiovascular: Negative for chest pain, leg swelling and palpitations.   Respiratory: Negative for cough and shortness of breath.    Hematologic/Lymphatic: Does not bruise/bleed easily.   Skin: Negative for itching, poor wound healing and rash.   Musculoskeletal: Positive for joint pain, joint swelling and stiffness. Negative for back pain, muscle weakness and myalgias.   Gastrointestinal: Negative for abdominal pain, constipation, diarrhea, nausea and vomiting.   Genitourinary: Negative.  Negative for frequency and hematuria.   Neurological: Negative for dizziness, headaches, numbness, paresthesias  and sensory change.   Psychiatric/Behavioral: Negative for altered mental status and depression. The patient is not nervous/anxious.    Allergic/Immunologic: Negative for hives.       Pain Related Questions  Over the past 3 days, what was your average pain during activity? (I.e. running, jogging, walking, climbing stairs, getting dressed, ect.): 4  Over the past 3 days, what was your highest pain level?: 7  Over the past 3 days, what was your lowest pain level? : 1    Other  Was the patient's HEIGHT measured or patient reported?: Patient Reported  Was the patient's WEIGHT measured or patient reported?: Measured      Objective:        General: Fredy is well-developed, well-nourished, appears stated age, in no acute distress, alert and oriented to time, place and person.     General    Vitals reviewed.  Constitutional: He is oriented to person, place, and time. He appears well-developed and well-nourished. No distress.   HENT:   Head: Normocephalic and atraumatic.   Mouth/Throat: No oropharyngeal exudate.   Eyes: EOM are normal. Right eye exhibits no discharge. Left eye exhibits no discharge.   Neck: Normal range of motion.   Cardiovascular: Normal rate and regular rhythm.    Pulmonary/Chest: Effort normal and breath sounds normal. No respiratory distress.   Neurological: He is alert and oriented to person, place, and time. He has normal reflexes. No cranial nerve deficit. Coordination normal.   Psychiatric: He has a normal mood and affect. His behavior is normal. Judgment and thought content normal.           Right Knee Exam     Inspection   Erythema: absent  Scars: present  Swelling: present  Deformity: deformity  Bruising: absent    Tenderness   The patient is tender to palpation of the medial joint line, lateral joint line and medial hamstring.    Range of Motion   Extension:  0 normal   Flexion:  30 abnormal     Tests   Ligament Examination Lachman: normal (-1 to 2mm) PCL-Posterior Drawer: normal (0 to 2mm)      MCL - Valgus: normal (0 to 2mm)  LCL - Varus: normalReverse Pivot Shift: normal (Equal)  Patella   Patellar Apprehension: negative  Passive Patellar Tilt: neutral  Patellar Tracking: normal  Patellar Glide (quadrants): Lateral - 1   Medial - 2  Q-Angle at 90 degrees: normal  Patellar Grind: negative  J-Sign: none    Other   Muscle Tightness: hamstring tightness  Sensation: normal    Comments:  Sutures removed. No signs of infection or necrosis. NO purulent drainage. Erythema noted around incisions likely from local anesthetic reaction injected during surgery.    Left Knee Exam     Inspection   Erythema: absent  Scars: absent  Swelling: absent  Deformity: deformity  Bruising: absent    Tenderness   The patient is experiencing no tenderness.         Range of Motion   Extension:  0 normal   Flexion:  140 normal     Tests   Meniscus   Mikel:  Medial - negative Lateral - negative  Stability Lachman: normal (-1 to 2mm) PCL-Posterior Drawer: normal (0 to 2mm)  MCL - Valgus: normal (0 to 2mm)  LCL - Varus: normal (0 to 2mm)Pivot Shift: normal (Equal)Reverse Pivot Shift: normal (Equal)  Posterior Sag Test: negative  Posterolateral Corner: unstable (>15 degrees difference)  Patella   Patellar Apprehension: negative  Passive Patellar Tilt: neutral  Patellar Tracking: normal  Patellar Glide (Quadrants): Lateral - 1 Medial - 2  Q-Angle at 90 degrees: normal  Patellar Grind: negative  J-Sign: J sign absent    Other   Meniscal Cyst: absent  Popliteal (Baker's) Cyst: absent  Sensation: normal    Right Hip Exam     Tests   Justine: negative  Left Hip Exam     Tests   Justine: negative          Muscle Strength   Right Lower Extremity   Hip Abduction: 5/5   Quadriceps:  4/5   Hamstrin/5   Left Lower Extremity   Hip Abduction: 5/5   Quadriceps:  5/5   Hamstrin/5     Reflexes     Left Side  Quadriceps:  2+  Achilles:  2+    Right Side   Quadriceps:  2+  Achilles:  2+    Vascular Exam     Right Pulses  Dorsalis Pedis:      2+  and 1+  Posterior Tibial:      2+        Left Pulses  Dorsalis Pedis:      2+  Posterior Tibial:      2+        Edema  Right Lower Leg: absent        RADIOGRAPHS today :  Right knee:  FINDINGS:  Postsurgical changes identified.  The position alignment is satisfactory.  No fracture or bone destruction.          Assessment:       Encounter Diagnosis   Name Primary?    S/P ACL surgery Yes          Plan:       1. IKDC, SF-12 and KOOS was not filled out today in clinic.     RTC in 4 weeks with Dr. Ryland Coffey for 6 week post op appt. Patient will not fill out IKDC, SF-12 and KOOS on return.    2. Start PT now. New PT orders placed for post op PT.    3. Finish Bactrim Rx    4. Sutures removed. No signs of infection.    5. Immobilizer locked straight for ambulation and during sleep; okay to unlock brace at rest.    6. Patient is not ready to go back to sedentary work this week because he has been unable to sleep at night    7. PHYSICAL THERAPY:  The patient should be held out of physical therapy for the   first 1 to 2 weeks following the surgery.     AMBULATION AND WEIGHTBEARING STATUS:  The patient should be full weightbearing   as tolerated with the right knee in a hinged knee immobilizer locked in -10   degrees hyperextension with gait.     RANGE OF MOTION:  No range of motion for the first 2 weeks following surgery.    Initiating range of motion from 0 to 30 degrees at 2 weeks and 0-45 degrees at 3   weeks and 0 and 90 degrees at 5 weeks and full range of motion after 6 weeks   postoperative rehabilitation.              Patient questionnaires may have been collected.

## 2018-07-31 ENCOUNTER — TELEPHONE (OUTPATIENT)
Dept: SPORTS MEDICINE | Facility: CLINIC | Age: 48
End: 2018-07-31

## 2018-07-31 NOTE — TELEPHONE ENCOUNTER
Received STD Extension/Progress Report form from [facility]. Form completed and faxed to [phone]. Pia from [facility] called stating that my first fax did not go through so refaxed to the above number. She said it was an updated fax number.    Jazmin Nunezspadmini [facility]

## 2018-08-01 ENCOUNTER — CLINICAL SUPPORT (OUTPATIENT)
Dept: REHABILITATION | Facility: HOSPITAL | Age: 48
End: 2018-08-01
Payer: COMMERCIAL

## 2018-08-01 DIAGNOSIS — Z98.890 S/P ACL SURGERY: ICD-10-CM

## 2018-08-01 PROBLEM — G89.29 CHRONIC PAIN OF RIGHT KNEE: Status: ACTIVE | Noted: 2018-06-06

## 2018-08-01 PROCEDURE — 97110 THERAPEUTIC EXERCISES: CPT

## 2018-08-01 PROCEDURE — 97161 PT EVAL LOW COMPLEX 20 MIN: CPT

## 2018-08-07 NOTE — PLAN OF CARE
[facility]  Physical Therapy Initial Evaluation    Name: Fredy Taylor Jr.  Clinic Number: [id number]    Therapy Diagnosis: No diagnosis found.  Physician: Keila Moody PA-C    Physician Orders: PT Eval and Treat   Medical Diagnosis: s/p ACL repair  Evaluation Date: 8/1/2018  Authorization Period Expiration: 12/31/18  Plan of Care Certification Period: 2/1/2019    Visit # / Visits authorized: 1/ 52    Time In: 800  Time Out: 900  Total Time: 60 minutes    Precautions: RANGE OF MOTION:  No range of motion for the first 2 weeks following surgery.    Initiating range of motion from 0 to 30 degrees at 2 weeks and 0-45 degrees at 3   weeks and 0 and 90 degrees at 5 weeks and full range of motion after 6 weeks   postoperative rehabilitation.  Date of Surgery: 7/10/18      Subjective     Date of onset: 1.5 mo ago  History of current condition - Fredy reports: that he was four wheeling and fell and tore his ACL.       Past Medical History:   Diagnosis Date    Elevated cholesterol     Hay fever     Hypertension      Fredy Taylor Jr.  has a past surgical history that includes Vasectomy; Tendon repair (Left); arthroscopic repair of anterior cruciate ligament (Right, 7/10/2018); chondroplasty (Right, 7/10/2018); Synovectomy (Right, 7/10/2018); injection of steroid (Right, 7/10/2018); and Knee surgery.    Fredy has a current medication list which includes the following prescription(s): aspirin, bupropion, celecoxib, clotrimazole-betamethasone 1-0.05%, fluticasone, hydrocodone-acetaminophen, losartan, promethazine, and sulfamethoxazole-trimethoprim 800-160mg.    Review of patient's allergies indicates:   Allergen Reactions    Percocet [oxycodone-acetaminophen] Hives        Imaging, MRI studies:   1. Complete proximal ACL tear.  2. Subchondral fracture of posterior lateral tibial plateau.  3. Mild patellofemoral and medial tibiofemoral cartilage loss.    Prior Therapy: no  Social  History:  lives with their family  Occupation:   Prior Level of Function: Independent   Current Level of Function: use of crutches for mobility; unable to self care; unable to drive    Sports/Recreational Activities: NA  Extremity Dominance: R     Pain:  Current 1/10, worst 9/10, best 0/10   Location: right knee   Description: Aching and Sharp  Aggravating Factors: Sitting and Extension  Easing Factors: rest    Pts goals: return to work and four wheeling; hunting    Objective       Observation: Pt entered with drop lock knee brace and B axillary crutches. No s/s of infection    Palpation: generalized TTP      Range of Motion: NT secondary to surgery      Right Knee    Left Knee    Patella Mobility: normal B  Right Knee:    Left Knee:      Flexibility: NT secondary to surgery    Hamstring  Hip Flexor  Quad  GSS  ITB    Strength: NT secondary to surgery    Right Hip  Flexion:  Extension:  Abduction:    Left Hip  Flexion:  Extension:   Abduction:    Right Knee  Flexion:  Extension:    Left Knee  Flexion:  Extension:    Special Tests: NT secondary to surgery    Mikel's  Justine's  Anterior Drawer  Posterior Drawer  Valgus Laxity  Varus Laxity        CMS Impairment/Limitation/Restriction for FOTO knee Survey    Therapist reviewed FOTO scores for Fredy Taylor  on 8/1/2018.   FOTO documents entered into [employer] - see Media section.    Limitation Score: 48%           TREATMENT   Treatment Time In: 350  Treatment Time Out: 450  Total Treatment time separate from Evaluation time:15    Fredy received therapeutic exercises to develop strength, endurance, ROM, flexibility, posture and core stabilization for 15 minutes including:    quad set 3 x 10 x 5 sh  SLR 3 x 10  LLR 3 x 10    Cryotherapy  x 10    Home Exercises and Patient Education Provided    Education provided re: HEP2 go    Written Home Exercises Provided: .  The goals were discussed with the patient and patient is in agreement with them as to be addressed  in the treatment plan. Pt was given a HEP consisting of      quad set 3 x 10 x 5 sh  SLR 3 x 10  LLR 3 x 10    Pt verbally understood the instructions as they were given and demonstrated proper form and technique during therapy.  Exercises were reviewed and Fredy was able to demonstrate them prior to the end of the session.   Pt received a written copy of exercises to perform at home. Fredy demonstrated good  understanding of the education provided.    Pt was advised to perform these exercises free of pain, and to stop performing them if pain occurs.     See EMR under patient instructions for exercises given.   Assessment   Fredy is a 47 y.o. male referred to outpatient Physical Therapy with a medical diagnosis of s/p ACl repair. Pt presents with pain, decreased ROM, and decreased strength consistnet with post oeprative status    Pt prognosis is Good.   Pt will benefit from skilled outpatient Physical Therapy to address the deficits stated above and in the chart below, provide pt/family education, and to maximize pt's level of independence.     Plan of care discussed with patient: Yes  Pt's spiritual, cultural and educational needs considered and patient is agreeable to the plan of care and goals as stated below:     Anticipated Barriers for therapy: pt noncompliant. We discussed correct use of brace and precautions, pt reported that he has not followed precautions    Medical Necessity is demonstrated by the following  History  Co-morbidities and personal factors that may impact the plan of care Co-morbidities:   depression    Personal Factors:   social background  attitudes     low   Examination  Body Structures and Functions, activity limitations and participation restrictions that may impact the plan of care Body Regions:   lower extremities    Body Systems:    gross symmetry  ROM  strength    Participation Restrictions:   Walking  lifting    Activity limitations:   work      Mobility  lifting and carrying  objects  walking             low   Clinical Presentation stable and uncomplicated low   Decision Making/ Complexity Score: low     GOALS: Short Term Goals: 6-8 weeks  1.Report decreased left knee pain < / = 5/10 to increase tolerance for ADLs and strengthening exercises.  2. Increase knee ROM to 0-120 degrees (passive flexion) in order to be able to perform ADLs without difficulty.  3. Increase strength in left quad in order to perform 20 straight leg raises without difficulty in order to ambulate safely full weight-bearing with brace.  4. Pt to tolerate HEP to improve ROM and independence with ADL's.     Long Term Goals: 16-24 weeks  1. Report decreased left knee pain < / = 2/10 to increase tolerance for return to full sport/agility activities as needed.  2. Patient goal: Return to full functional mobility and recreational activities without pain or difficulty.  3. Increase strength to = 5/5 in gross bilateral lower extremities to increase tolerance for sport and plyometric activities.  4. Demonstrate single limb squat with proper biomechanics in order to safely return to daily and recreational activities.  5. Patient will be comfortable with long-term home exercise program for continued safety with sport participation.    Plan   Certification Period/Plan of care expiration: 8/1/2018 to 2/1/2019.    Outpatient Physical Therapy 1-2 times weekly for 16-24 weeks to include the following interventions: manual therapy, therapeutic exercise, home exercise program, patient education, wound care PRN, and modalities PRN to achieve established goals. Fredy may at times be seen by a PTA as part of the Rehab Team.     Mildred Tijerina PT, DPT, SCS, CSCS  08/01/2018        Altagracia Tijerina PT

## 2018-08-08 ENCOUNTER — CLINICAL SUPPORT (OUTPATIENT)
Dept: REHABILITATION | Facility: HOSPITAL | Age: 48
End: 2018-08-08
Payer: COMMERCIAL

## 2018-08-08 DIAGNOSIS — G89.29 CHRONIC PAIN OF RIGHT KNEE: ICD-10-CM

## 2018-08-08 DIAGNOSIS — M25.561 CHRONIC PAIN OF RIGHT KNEE: ICD-10-CM

## 2018-08-08 DIAGNOSIS — S83.511A RUPTURE OF ANTERIOR CRUCIATE LIGAMENT OF RIGHT KNEE, INITIAL ENCOUNTER: Primary | ICD-10-CM

## 2018-08-08 PROCEDURE — 97110 THERAPEUTIC EXERCISES: CPT

## 2018-08-08 PROCEDURE — 97140 MANUAL THERAPY 1/> REGIONS: CPT

## 2018-08-13 NOTE — PROGRESS NOTES
Physical Therapy Daily Treatment Note     Name: Fredy Taylor Jr.  Clinic Number: [id number]    Therapy Diagnosis:chronic pain on R knee   Physician: Keila Moody PA-C    Visit Date: 8/8/2018  Physician Orders: PT Eval and Treat   Medical Diagnosis: s/p ACL repair  Evaluation Date: 8/1/2018  Authorization Period Expiration: 12/31/18  Plan of Care Certification Period: 2/1/2019                          Visit # / Visits authorized: 1/ 52     Time In: 800  Time Out: 900  Total Time: 50 minutes     Precautions: RANGE OF MOTION:  No range of motion for the first 2 weeks following surgery.    Initiating range of motion from 0 to 30 degrees at 2 weeks and 0-45 degrees at 3   weeks and 0 and 90 degrees at 5 weeks and full range of motion after 6 weeks   postoperative rehabilitation.  Date of Surgery: 7/10/18    Subjective     Pt reports:ambulating with brace unlocked sometimes.  He was compliant with home exercise program.  Response to previous treatment: none   Functional change:no changes     Pain: 0/10  Location: right knee      Objective     Fredy received therapeutic exercises to develop strength, endurance, ROM, flexibility and posture for 40 minutes including:  EOT knee flexion gentle stretch ~ 95 degrees - several trials controled by PTA   QS with HS stretch 2 minutes - holding 3 sec - long sitting   QS with SLR 2 x 10 reps   SL hip abduction 2 x 10 reps   Prone:hip extension 2 x 10 reps   Standing:  Heel raises 2 x 10 reps   TKE ball against wall 2 x 10 reps     Fredy received the following manual therapy techniques: Joint mobilizations and Soft tissue Mobilization were applied to the: patella/tendon for 10 minutes, including:    Fredy received cold pack for 10 minutes to to decrease circulation, pain, and swelling.    Home Exercises Provided and Patient Education Provided     Education provided:   - good body mechanics while performing therex and proper use of knee brace     Written Home Exercises  Provided: Patient instructed to cont prior HEP.  Exercises were reviewed and Fredy was able to demonstrate them prior to the end of the session.  Fredy demonstrated good  understanding of the education provided.     Assessment   Pt with good tolerance to PT today, little impulsive with ambulation wearing brace. Pt with good quad muscles activation and good AROM.   Fredy is progressing well towards his goals.   Pt prognosis is Good.     Pt will continue to benefit from skilled outpatient physical therapy to address the deficits listed in the problem list box on initial evaluation, provide pt/family education and to maximize pt's level of independence in the home and community environment.     Pt's spiritual, cultural and educational needs considered and pt agreeable to plan of care and goals.    Anticipated barriers to physical therapy: none       GOALS: Short Term Goals: 6-8 weeks  1.Report decreased left knee pain < / = 5/10 to increase tolerance for ADLs and strengthening exercises.(met, 8/8/18)   2. Increase knee ROM to 0-120 degrees (passive flexion) in order to be able to perform ADLs without difficulty.(progressing, not met)  3. Increase strength in left quad in order to perform 20 straight leg raises without difficulty in order to ambulate safely full weight-bearing with brace.(progressing, not met)  4. Pt to tolerate HEP to improve ROM and independence with ADL's.(progressing, not met)     Long Term Goals: 16-24 weeks  1. Report decreased left knee pain < / = 2/10 to increase tolerance for return to full sport/agility activities as needed.(progressing, not met)  2. Patient goal: Return to full functional mobility and recreational activities without pain or difficulty.(progressing, not met)  3. Increase strength to = 5/5 in gross bilateral lower extremities to increase tolerance for sport and plyometric activities.(progressing, not met)  4. Demonstrate single limb squat with proper biomechanics in order to  safely return to daily and recreational activities.(progressing, not met)  5. Patient will be comfortable with long-term home exercise program for continued safety with sport participation.(progressing, not met)      Plan   Continue with POC towards established PT goals.     Reva Quan, PTA

## 2018-08-14 ENCOUNTER — CLINICAL SUPPORT (OUTPATIENT)
Dept: REHABILITATION | Facility: HOSPITAL | Age: 48
End: 2018-08-14
Payer: COMMERCIAL

## 2018-08-14 DIAGNOSIS — G89.29 CHRONIC PAIN OF RIGHT KNEE: Primary | ICD-10-CM

## 2018-08-14 DIAGNOSIS — M25.561 CHRONIC PAIN OF RIGHT KNEE: Primary | ICD-10-CM

## 2018-08-14 PROCEDURE — 97110 THERAPEUTIC EXERCISES: CPT

## 2018-08-14 NOTE — PROGRESS NOTES
Physical Therapy Daily Treatment Note     Name: Fredy Taylor Jr.  Clinic Number: [id number]    Therapy Diagnosis:chronic pain on R knee   Physician: Keila Moody PA-C    Visit Date: 8/14/2018  Physician Orders: PT Eval and Treat   Medical Diagnosis: s/p ACL repair  Evaluation Date: 8/1/2018  Authorization Period Expiration: 12/31/18  Plan of Care Certification Period: 2/1/2019                          Visit # / Visits authorized: 3/ 52     Time In: 810  Time Out: 900  Total Time: 50 minutes     Precautions:   AMBULATION AND WEIGHTBEARING STATUS:  The patient should be full weightbearing   as tolerated with the right knee in a hinged knee immobilizer locked in -10   degrees hyperextension with gait.     RANGE OF MOTION:  No range of motion for the first 2 weeks following surgery.    Initiating range of motion from 0 to 30 degrees at 2 weeks and 0-45 degrees at 3   weeks and 0 and 90 degrees at 5 weeks and full range of motion after 6 weeks   postoperative rehabilitation.  Date of Surgery: 7/10/18  Post op day: 35    Subjective     Pt reports:that he is doing very well - no pain.   He was compliant with home exercise program.  Response to previous treatment: improved quad    Functional change:no changes     Pain: 0/10  Location: right knee      Objective     Fredy received therapeutic exercises to develop strength, endurance, ROM, flexibility and posture for 40 minutes including:    QS with SLR 3 x 10 x 5sh   SL hip abduction 3 x 10 x 5 sh  Clamshells 3 x 10  OTB  Heel raises 2 x 10 reps   kickouts 3 x 10 OTB  Walk outs on turf 1 lap OTB  TKE ball against wall 3 x 10 reps   Cone taps 3 x 10    Fredy received the following manual therapy techniques: Joint mobilizations and Soft tissue Mobilization were applied to the: patella/tendon for 10 minutes, including:  Patella mobs  STM around knee    Fredy received cold pack for 10 minutes to to decrease circulation, pain, and swelling.    Home Exercises Provided and  Patient Education Provided     Education provided:   - good body mechanics while performing therex and proper use of knee brace     Written Home Exercises Provided: Patient instructed to cont prior HEP.  Exercises were reviewed and Fredy was able to demonstrate them prior to the end of the session.  Fredy demonstrated good  understanding of the education provided.     Assessment   Pt demosntrating improved quad strength. Progress from brace per protocol next visit if quad is adequate.  Pt prognosis is Good.     Pt will continue to benefit from skilled outpatient physical therapy to address the deficits listed in the problem list box on initial evaluation, provide pt/family education and to maximize pt's level of independence in the home and community environment.     Pt's spiritual, cultural and educational needs considered and pt agreeable to plan of care and goals.    Anticipated barriers to physical therapy: none       GOALS: Short Term Goals: 6-8 weeks  1.Report decreased left knee pain < / = 5/10 to increase tolerance for ADLs and strengthening exercises.(met, 8/8/18)   2. Increase knee ROM to 0-120 degrees (passive flexion) in order to be able to perform ADLs without difficulty.(progressing, not met)  3. Increase strength in left quad in order to perform 20 straight leg raises without difficulty in order to ambulate safely full weight-bearing with brace.(progressing, not met)  4. Pt to tolerate HEP to improve ROM and independence with ADL's.(progressing, not met)     Long Term Goals: 16-24 weeks  1. Report decreased left knee pain < / = 2/10 to increase tolerance for return to full sport/agility activities as needed.(progressing, not met)  2. Patient goal: Return to full functional mobility and recreational activities without pain or difficulty.(progressing, not met)  3. Increase strength to = 5/5 in gross bilateral lower extremities to increase tolerance for sport and plyometric activities.(progressing, not  met)  4. Demonstrate single limb squat with proper biomechanics in order to safely return to daily and recreational activities.(progressing, not met)  5. Patient will be comfortable with long-term home exercise program for continued safety with sport participation.(progressing, not met)      Plan   Continue with POC towards established PT goals.     Altagracia Tijerina, PT

## 2018-08-21 ENCOUNTER — CLINICAL SUPPORT (OUTPATIENT)
Dept: REHABILITATION | Facility: HOSPITAL | Age: 48
End: 2018-08-21
Payer: COMMERCIAL

## 2018-08-21 PROCEDURE — 97140 MANUAL THERAPY 1/> REGIONS: CPT

## 2018-08-21 PROCEDURE — 97110 THERAPEUTIC EXERCISES: CPT

## 2018-08-23 ENCOUNTER — TELEPHONE (OUTPATIENT)
Dept: SPORTS MEDICINE | Facility: CLINIC | Age: 48
End: 2018-08-23

## 2018-08-23 ENCOUNTER — OFFICE VISIT (OUTPATIENT)
Dept: SPORTS MEDICINE | Facility: CLINIC | Age: 48
End: 2018-08-23
Payer: COMMERCIAL

## 2018-08-23 ENCOUNTER — HOSPITAL ENCOUNTER (OUTPATIENT)
Dept: RADIOLOGY | Facility: HOSPITAL | Age: 48
Discharge: HOME | End: 2018-08-23
Attending: PHYSICIAN ASSISTANT
Payer: COMMERCIAL

## 2018-08-23 VITALS
HEART RATE: 103 BPM | HEIGHT: 71 IN | WEIGHT: 235 LBS | SYSTOLIC BLOOD PRESSURE: 140 MMHG | DIASTOLIC BLOOD PRESSURE: 84 MMHG | BODY MASS INDEX: 32.9 KG/M2

## 2018-08-23 DIAGNOSIS — M25.561 RIGHT KNEE PAIN, UNSPECIFIED CHRONICITY: Primary | ICD-10-CM

## 2018-08-23 DIAGNOSIS — Z98.890 S/P ACL RECONSTRUCTION: ICD-10-CM

## 2018-08-23 DIAGNOSIS — M25.561 RIGHT KNEE PAIN, UNSPECIFIED CHRONICITY: ICD-10-CM

## 2018-08-23 DIAGNOSIS — M23.91 ACUTE INTERNAL DERANGEMENT OF RIGHT KNEE: ICD-10-CM

## 2018-08-23 PROCEDURE — 99024 POSTOP FOLLOW-UP VISIT: CPT | Mod: S$GLB,,, | Performed by: PHYSICIAN ASSISTANT

## 2018-08-23 PROCEDURE — 73560 X-RAY EXAM OF KNEE 1 OR 2: CPT | Mod: TC,FY,PO,RT

## 2018-08-23 PROCEDURE — 73560 X-RAY EXAM OF KNEE 1 OR 2: CPT | Mod: 26,RT,, | Performed by: RADIOLOGY

## 2018-08-23 PROCEDURE — 99999 PR PBB SHADOW E&M-EST. PATIENT-LVL III: CPT | Mod: PBBFAC,,, | Performed by: PHYSICIAN ASSISTANT

## 2018-08-23 NOTE — PROGRESS NOTES
Subjective:          Chief Complaint: Fredy Taylor Jr. is a 47 y.o. male who had concerns including Post-op Evaluation of the Right Knee.    Patient is here for 6 week post op of ACL reconstruction. Pain today is 2/10. He is WBAT with T-scope brace locked in extension. 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  He has not been taking the Norco anymore. He has been taking ASA 325mg once a day and Celebrex 200mg BID.  He has been going to PT with Mildred and Reva.     DATE OF PROCEDURE:  07/10/2011     ATTENDING SURGEON:  Ryland Coffey M.D.     FIRST ASSISTANT:  Crow oLzada M.D. (RES)     SECOND ASSISTANT:  SMA Zach-assistant.     PREOPERATIVE DIAGNOSIS:  Right knee anterior cruciate ligament tear.     POSTOPERATIVE DIAGNOSES:  1.  Right knee anterior cruciate ligament tear (proximal avulsion).  2.  Right knee synovitis.  3.  Right knee chondromalacia.     OPERATIVE PROCEDURES PERFORMED:  1.  Right knee anterior cruciate ligament repair/augmentation, CPT code 88285.  2.  Right knee arthroscopic synovectomy, CPT code 10204.  3.  Right knee arthroscopic chondroplasty, CPT code 54720.  4.  Right knee has arthrocentesis.  CPT code 27426.               Review of Systems   Constitution: Negative. Negative for chills, fever, weight gain and weight loss.   HENT: Negative for congestion and sore throat.    Eyes: Negative for blurred vision and double vision.   Cardiovascular: Negative for chest pain, leg swelling and palpitations.   Respiratory: Negative for cough and shortness of breath.    Hematologic/Lymphatic: Does not bruise/bleed easily.   Skin: Negative for itching, poor wound healing and rash.   Musculoskeletal: Positive for joint pain, joint swelling and stiffness. Negative for back pain, muscle weakness and myalgias.   Gastrointestinal: Negative for abdominal pain, constipation, diarrhea, nausea  and vomiting.   Genitourinary: Negative.  Negative for frequency and hematuria.   Neurological: Negative for dizziness, headaches, numbness, paresthesias and sensory change.   Psychiatric/Behavioral: Negative for altered mental status and depression. The patient is not nervous/anxious.    Allergic/Immunologic: Negative for hives.       Pain Related Questions  Over the past 3 days, what was your average pain during activity? (I.e. running, jogging, walking, climbing stairs, getting dressed, ect.): 7  Over the past 3 days, what was your highest pain level?: 7  Over the past 3 days, what was your lowest pain level? : 1    Other  How many nights a week are you awakened by your affected body part?: 0  Was the patient's HEIGHT measured or patient reported?: Patient Reported  Was the patient's WEIGHT measured or patient reported?: Measured      Objective:        General: Fredy is well-developed, well-nourished, appears stated age, in no acute distress, alert and oriented to time, place and person.     General    Vitals reviewed.  Constitutional: He is oriented to person, place, and time. He appears well-developed and well-nourished. No distress.   HENT:   Head: Normocephalic and atraumatic.   Mouth/Throat: No oropharyngeal exudate.   Eyes: EOM are normal. Right eye exhibits no discharge. Left eye exhibits no discharge.   Neck: Normal range of motion.   Cardiovascular: Normal rate and regular rhythm.    Pulmonary/Chest: Effort normal and breath sounds normal. No respiratory distress.   Neurological: He is alert and oriented to person, place, and time. He has normal reflexes. No cranial nerve deficit. Coordination normal.   Psychiatric: He has a normal mood and affect. His behavior is normal. Judgment and thought content normal.     General Musculoskeletal Exam   Gait: normal and antalgic       Right Knee Exam     Inspection   Erythema: absent  Scars: present  Swelling: present  Effusion: present  Deformity:  deformity  Bruising: absent    Tenderness   The patient is tender to palpation of the medial joint line, lateral joint line and medial hamstring.    Range of Motion   Extension:  0 normal   Flexion: 120     Tests   Ligament Examination Lachman: normal (-1 to 2mm) PCL-Posterior Drawer: normal (0 to 2mm)     MCL - Valgus: normal (0 to 2mm)  LCL - Varus: normalReverse Pivot Shift: normal (Equal)  Patella   Patellar apprehension: negative  Passive Patellar Tilt: neutral  Patellar Tracking: normal  Patellar Glide (quadrants): Lateral - 1   Medial - 2  Q-Angle at 90 degrees: normal  Patellar Grind: negative  J-Sign: none    Other   Muscle Tightness: hamstring tightness  Sensation: normal    Comments:  Incisions well healed.    Left Knee Exam     Inspection   Erythema: absent  Scars: absent  Swelling: absent  Deformity: deformity  Bruising: absent    Tenderness   The patient is experiencing no tenderness.     Range of Motion   Extension:  0 normal   Flexion:  140 normal     Tests   Meniscus   Mikel:  Medial - negative Lateral - negative  Stability Lachman: normal (-1 to 2mm) PCL-Posterior Drawer: normal (0 to 2mm)  MCL - Valgus: normal (0 to 2mm)  LCL - Varus: normal (0 to 2mm)Pivot Shift: normal (Equal)Reverse Pivot Shift: normal (Equal)  Posterior Sag Test: negative  Posterolateral Corner: unstable (>15 degrees difference)  Patella   Patellar apprehension: negative  Passive Patellar Tilt: neutral  Patellar Tracking: normal  Patellar Glide (Quadrants): Lateral - 1 Medial - 2  Q-Angle at 90 degrees: normal  Patellar Grind: negative  J-Sign: J sign absent    Other   Meniscal Cyst: absent  Popliteal (Baker's) Cyst: absent  Sensation: normal    Right Hip Exam     Tests   Justine: negative  Left Hip Exam     Tests   Justine: negative          Muscle Strength   Right Lower Extremity   Hip Abduction: 5/5   Quadriceps:  4/5   Hamstrin/5   Left Lower Extremity   Hip Abduction: 5/5   Quadriceps:  5/5   Hamstrin/5      Reflexes     Left Side  Quadriceps:  2+  Achilles:  2+    Right Side   Quadriceps:  2+  Achilles:  2+    Vascular Exam     Right Pulses  Dorsalis Pedis:      2+ and 1+  Posterior Tibial:      2+        Left Pulses  Dorsalis Pedis:      2+  Posterior Tibial:      2+        Edema  Right Lower Leg: absent                Assessment:       Encounter Diagnoses   Name Primary?    Right knee pain, unspecified chronicity Yes    S/P ACL reconstruction     Acute internal derangement of right knee           Plan:       1. IKDC, SF-12 and KOOS was not filled out today in clinic.     RTC in 6 weeks with Dr. Ryland Coffey for 3 month post op appt. Patient will fill out IKDC, SF-12 and KOOS on return.    2. Continue PT.    3. Discontinue immobilizer    4. PHYSICAL THERAPY:  The patient should be held out of physical therapy for the   first 1 to 2 weeks following the surgery.     AMBULATION AND WEIGHTBEARING STATUS:  The patient should be full weightbearing   as tolerated with the right knee in a hinged knee immobilizer locked in -10   degrees hyperextension with gait.     RANGE OF MOTION:  No range of motion for the first 2 weeks following surgery.    Initiating range of motion from 0 to 30 degrees at 2 weeks and 0-45 degrees at 3   weeks and 0 and 90 degrees at 5 weeks and full range of motion after 6 weeks   postoperative rehabilitation.      5. Discontinue ASA and Celebrex. May continue Ibuprofen since Celebrex not effective        Patient questionnaires may have been collected.

## 2018-08-23 NOTE — TELEPHONE ENCOUNTER
Left a message on his v/m that when we filled out his last return to work form we did indicate he could return to sedentary duty. That was the plan prior to his surgery. I will write a letter explaining that when we did see him on 7/18 we decided he was not able to return to sedentary duty at that time. We held him out of work for an additional 4 weeks. I will have Dr. Coffey sign it on Monday when he is in clinic and send it back to Taye.    Jazmin Mcnulty MA  [facility]

## 2018-08-27 NOTE — PROGRESS NOTES
Physical Therapy Daily Treatment Note     Name: Fredy Taylor Jr.  Clinic Number: [id number]    Therapy Diagnosis:chronic pain on R knee   Physician: Keila Moody PA-C    Visit Date: 8/21/2018  Physician Orders: PT Eval and Treat   Medical Diagnosis: s/p ACL repair  Evaluation Date: 8/1/2018  Authorization Period Expiration: 12/31/18  Plan of Care Certification Period: 2/1/2019                          Visit # / Visits authorized: 3/ 52     Time In:15:00   Time Out: 900  Total Time: 50 minutes     Precautions:   AMBULATION AND WEIGHTBEARING STATUS:  The patient should be full weightbearing   as tolerated with the right knee in a hinged knee immobilizer locked in -10   degrees hyperextension with gait.     RANGE OF MOTION:  No range of motion for the first 2 weeks following surgery.    Initiating range of motion from 0 to 30 degrees at 2 weeks and 0-45 degrees at 3   weeks and 0 and 90 degrees at 5 weeks and full range of motion after 6 weeks   postoperative rehabilitation.  Date of Surgery: 7/10/18  Post op day: 35    Subjective     Pt reports:that he is doing fine without pain.   He was compliant with home exercise program.  Response to previous treatment: improved quad    Functional change:no changes     Pain: 0/10  Location: right knee      Objective     Fredy received therapeutic exercises to develop strength, endurance, ROM, flexibility and posture for 40 minutes including:  QS with SLR 3 x 10 x 5sh   SL hip abduction 3 x 10 x 5 sh  Clamshells 3 x 10  OTB  Heel raises 2 x 10 reps   TKE ball against wall 3 x 10 reps   Cone taps 3 x 10    Fredy received the following manual therapy techniques: Joint mobilizations and Soft tissue Mobilization were applied to the: patella/tendon for 10 minutes, including:  Patella mobs  STM around knee    Fredy received cold pack for 10 minutes to to decrease circulation, pain, and swelling.    Home Exercises Provided and Patient Education Provided     Education provided:    - good body mechanics while performing therex and proper use of knee brace     Written Home Exercises Provided: Patient instructed to cont prior HEP.  Exercises were reviewed and Fredy was able to demonstrate them prior to the end of the session.  Fredy demonstrated good  understanding of the education provided.     Assessment   Pt demosntrating improved quad strength. PTA educated about using of knee brace. Pt verbally understood.   Pt prognosis is Good.     Pt will continue to benefit from skilled outpatient physical therapy to address the deficits listed in the problem list box on initial evaluation, provide pt/family education and to maximize pt's level of independence in the home and community environment.     Pt's spiritual, cultural and educational needs considered and pt agreeable to plan of care and goals.    Anticipated barriers to physical therapy: none       GOALS: Short Term Goals: 6-8 weeks  1.Report decreased left knee pain < / = 5/10 to increase tolerance for ADLs and strengthening exercises.(met, 8/8/18)   2. Increase knee ROM to 0-120 degrees (passive flexion) in order to be able to perform ADLs without difficulty.(progressing, not met)  3. Increase strength in left quad in order to perform 20 straight leg raises without difficulty in order to ambulate safely full weight-bearing with brace.(progressing, not met)  4. Pt to tolerate HEP to improve ROM and independence with ADL's.(progressing, not met)     Long Term Goals: 16-24 weeks  1. Report decreased left knee pain < / = 2/10 to increase tolerance for return to full sport/agility activities as needed.(progressing, not met)  2. Patient goal: Return to full functional mobility and recreational activities without pain or difficulty.(progressing, not met)  3. Increase strength to = 5/5 in gross bilateral lower extremities to increase tolerance for sport and plyometric activities.(progressing, not met)  4. Demonstrate single limb squat with proper  biomechanics in order to safely return to daily and recreational activities.(progressing, not met)  5. Patient will be comfortable with long-term home exercise program for continued safety with sport participation.(progressing, not met)      Plan   Continue with POC towards established PT goals.     Reva Quan, PTA

## 2018-08-28 ENCOUNTER — TELEPHONE (OUTPATIENT)
Dept: SPORTS MEDICINE | Facility: CLINIC | Age: 48
End: 2018-08-28

## 2018-08-28 ENCOUNTER — CLINICAL SUPPORT (OUTPATIENT)
Dept: REHABILITATION | Facility: HOSPITAL | Age: 48
End: 2018-08-28
Payer: COMMERCIAL

## 2018-08-28 DIAGNOSIS — G89.29 CHRONIC PAIN OF RIGHT KNEE: Primary | ICD-10-CM

## 2018-08-28 DIAGNOSIS — M25.561 CHRONIC PAIN OF RIGHT KNEE: Primary | ICD-10-CM

## 2018-08-28 PROCEDURE — 97110 THERAPEUTIC EXERCISES: CPT | Performed by: PHYSICAL THERAPIST

## 2018-08-28 NOTE — TELEPHONE ENCOUNTER
Received return to work status from patients employer. Form was completed and faxed back to them at [phone].    Jazmin Mcnulty MA  [facility]

## 2018-08-28 NOTE — PROGRESS NOTES
Physical Therapy Daily Treatment Note     Name: Fredy Taylor Jr.  Clinic Number: [id number]    Therapy Diagnosis:chronic pain on R knee   Physician: Keila Moody PA-C    Visit Date: 8/28/2018  Physician Orders: PT Eval and Treat   Medical Diagnosis: s/p ACL repair  Evaluation Date: 8/1/2018  Authorization Period Expiration: 12/31/18  Plan of Care Certification Period: 2/1/2019                          Visit # / Visits authorized: 4/ 52     Time In:15:00   Time Out: 1600  Total Time: 30 minutes     Precautions:   AMBULATION AND WEIGHTBEARING STATUS:  The patient should be full weightbearing   as tolerated with the right knee in a hinged knee immobilizer locked in -10   degrees hyperextension with gait.     RANGE OF MOTION:  No range of motion for the first 2 weeks following surgery.    Initiating range of motion from 0 to 30 degrees at 2 weeks and 0-45 degrees at 3   weeks and 0 and 90 degrees at 5 weeks and full range of motion after 6 weeks   postoperative rehabilitation.  Date of Surgery: 7/10/18  Post op day: 35    Subjective     Pt reports no new c/o this PM in R knee, willing to attempt Jo-Ann as tolerated     .   He was compliant with home exercise program.  Response to previous treatment: improved quad    Functional change:no changes     Pain: 0/10  Location: right knee      Objective     Fredy received therapeutic exercises to develop strength, endurance, ROM, flexibility and posture for 45 minutes including:  QS with SLR 3 x 10 x 5sh   SL hip abduction 3 x 10 x 5 sh  Clamshells 3 x 10  OTB  Heel raises 2 x 10 reps   TKE ball against wall 3 x 10 reps   Cone taps 3 x 10    Fredy received the following manual therapy techniques: Joint mobilizations and Soft tissue Mobilization were applied to the: patella/tendon for 10 minutes, including:  Patella mobs  STM around knee    Fredy received cold pack for 10 minutes to to decrease circulation, pain, and swelling.    Home Exercises Provided and Patient  Education Provided     Education provided:   - good body mechanics while performing therex and proper use of knee brace     Written Home Exercises Provided: Patient instructed to cont prior HEP.  Exercises were reviewed and Fredy was able to demonstrate them prior to the end of the session.  Fredy demonstrated good  understanding of the education provided.     Assessment   Pt demosntrating improved quad strength. PTA educated about using of knee brace. Pt verbally understood.   Pt prognosis is Good.     Pt will continue to benefit from skilled outpatient physical therapy to address the deficits listed in the problem list box on initial evaluation, provide pt/family education and to maximize pt's level of independence in the home and community environment.     Pt's spiritual, cultural and educational needs considered and pt agreeable to plan of care and goals.    Anticipated barriers to physical therapy: none       GOALS: Short Term Goals: 6-8 weeks  1.Report decreased left knee pain < / = 5/10 to increase tolerance for ADLs and strengthening exercises.(met, 8/8/18)   2. Increase knee ROM to 0-120 degrees (passive flexion) in order to be able to perform ADLs without difficulty.(progressing, not met)  3. Increase strength in left quad in order to perform 20 straight leg raises without difficulty in order to ambulate safely full weight-bearing with brace.(progressing, not met)  4. Pt to tolerate HEP to improve ROM and independence with ADL's.(progressing, not met)     Long Term Goals: 16-24 weeks  1. Report decreased left knee pain < / = 2/10 to increase tolerance for return to full sport/agility activities as needed.(progressing, not met)  2. Patient goal: Return to full functional mobility and recreational activities without pain or difficulty.(progressing, not met)  3. Increase strength to = 5/5 in gross bilateral lower extremities to increase tolerance for sport and plyometric activities.(progressing, not met)  4.  Demonstrate single limb squat with proper biomechanics in order to safely return to daily and recreational activities.(progressing, not met)  5. Patient will be comfortable with long-term home exercise program for continued safety with sport participation.(progressing, not met)      Plan   Continue with POC towards established PT goals.     Luisito Jimenez, PT

## 2018-09-04 ENCOUNTER — CLINICAL SUPPORT (OUTPATIENT)
Dept: REHABILITATION | Facility: HOSPITAL | Age: 48
End: 2018-09-04
Payer: COMMERCIAL

## 2018-09-04 DIAGNOSIS — M25.561 CHRONIC PAIN OF RIGHT KNEE: ICD-10-CM

## 2018-09-04 DIAGNOSIS — G89.29 CHRONIC PAIN OF RIGHT KNEE: ICD-10-CM

## 2018-09-04 PROCEDURE — 97110 THERAPEUTIC EXERCISES: CPT | Performed by: PHYSICAL THERAPIST

## 2018-09-04 PROCEDURE — 97140 MANUAL THERAPY 1/> REGIONS: CPT | Performed by: PHYSICAL THERAPIST

## 2018-09-04 NOTE — PROGRESS NOTES
Physical Therapy Daily Treatment Note     Name: Fredy Taylor Jr.  Clinic Number: [id number]    Therapy Diagnosis:chronic pain on R knee   Physician: Keila Moody PA-C    Visit Date: 9/4/2018  Physician Orders: PT Eval and Treat   Medical Diagnosis: s/p ACL repair  Evaluation Date: 8/1/2018  Authorization Period Expiration: 12/31/18  Plan of Care Certification Period: 2/1/2019                          Visit # / Visits authorized: 5 / 52     Time In:15:00   Time Out: 16:15  Total Billable Time: 60 minutes     Precautions:   AMBULATION AND WEIGHTBEARING STATUS:  The patient should be full weightbearing   as tolerated with the right knee in a hinged knee immobilizer locked in -10   degrees hyperextension with gait.     RANGE OF MOTION:  No range of motion for the first 2 weeks following surgery.    Initiating range of motion from 0 to 30 degrees at 2 weeks and 0-45 degrees at 3   weeks and 0 and 90 degrees at 5 weeks and full range of motion after 6 weeks   postoperative rehabilitation.    Date of Surgery: 7/10/18      Subjective     Pt reports soreness anteriorly in patellar tendon from prolonged standing or prolonged sitting :    .   He was compliant with home exercise program.  Response to previous treatment: improved quad    Functional change:no changes     Pain: 2/10  Location: right knee  Patellar tendon     Objective     PO 42    Measurements:  Loss of motion in extension 0/5/130    Fredy received the following manual therapy techniques: Joint mobilizations and Soft tissue Mobilization were applied to the: patella/tendon for 8 minutes, including:  Patella mobs  STM around knee      Fredy received therapeutic exercises to develop strength, endurance, ROM, flexibility and posture for 45 minutes one one one with PT including:    Stick upper / lower leg  Bike x 10' seat at 8 5.0 resist  HSS 5x;15  Supine ext hang 3'x1 0#  QS 1x10:10   Sit up SLR 1x10:05 0#  Supine SLR 1x10:10 0#  B bridge with L foot on med  ball 2x10:05   St hip abd 3xburn R/L Or  U HR 3x10   SLS foam 5x:15 EO    Pt declined use of CP     Home Exercises Provided and Patient Education Provided     Education provided:   - good body mechanics while performing therex and proper use of knee brace     Written Home Exercises Provided: Patient instructed to cont prior HEP.  Exercises were reviewed and Fredy was able to demonstrate them prior to the end of the session.  Fredy demonstrated good  understanding of the education provided.     Assessment     Pt elida Rx without increase in sxs   Pt requires PT verbal cueing for proper exercise performance   .   Pt prognosis is Good.     Pt will continue to benefit from skilled outpatient physical therapy to address the deficits listed in the problem list box on initial evaluation, provide pt/family education and to maximize pt's level of independence in the home and community environment.     Pt's spiritual, cultural and educational needs considered and pt agreeable to plan of care and goals.    Anticipated barriers to physical therapy: none       GOALS: Short Term Goals: 6-8 weeks  1.Report decreased left knee pain < / = 5/10 to increase tolerance for ADLs and strengthening exercises.(met, 8/8/18)   2. Increase knee ROM to 0-120 degrees (passive flexion) in order to be able to perform ADLs without difficulty.(progressing, not met)  3. Increase strength in left quad in order to perform 20 straight leg raises without difficulty in order to ambulate safely full weight-bearing with brace.(progressing, not met)  4. Pt to tolerate HEP to improve ROM and independence with ADL's.(progressing, not met)     Long Term Goals: 16-24 weeks  1. Report decreased left knee pain < / = 2/10 to increase tolerance for return to full sport/agility activities as needed.(progressing, not met)  2. Patient goal: Return to full functional mobility and recreational activities without pain or difficulty.(progressing, not met)  3. Increase  strength to = 5/5 in gross bilateral lower extremities to increase tolerance for sport and plyometric activities.(progressing, not met)  4. Demonstrate single limb squat with proper biomechanics in order to safely return to daily and recreational activities.(progressing, not met)  5. Patient will be comfortable with long-term home exercise program for continued safety with sport participation.(progressing, not met)      Plan     Continue with established Plan of Care towards PT goals with focus on decreasing pain, increasing ROM, strength, neuromuscular control and functional status     Luisito Jimenez, PT

## 2018-09-12 ENCOUNTER — CLINICAL SUPPORT (OUTPATIENT)
Dept: REHABILITATION | Facility: HOSPITAL | Age: 48
End: 2018-09-12
Payer: COMMERCIAL

## 2018-09-12 DIAGNOSIS — G89.29 CHRONIC PAIN OF RIGHT KNEE: Primary | ICD-10-CM

## 2018-09-12 DIAGNOSIS — M25.561 CHRONIC PAIN OF RIGHT KNEE: Primary | ICD-10-CM

## 2018-09-12 PROCEDURE — 97110 THERAPEUTIC EXERCISES: CPT | Performed by: PHYSICAL THERAPIST

## 2018-09-12 NOTE — PROGRESS NOTES
Physical Therapy Daily Treatment Note     Name: Fredy Taylor Jr.  Clinic Number: [id number]    Therapy Diagnosis:chronic pain on R knee   Physician: Keila Moody PA-C    Visit Date: 9/12/2018  Physician Orders: PT Eval and Treat   Medical Diagnosis: s/p ACL repair  Evaluation Date: 8/1/2018  Authorization Period Expiration: 12/31/18  Plan of Care Certification Period: 2/1/2019                          Visit # / Visits authorized: 7 / 52     Time In:15:30   Time Out: 16:30  Total Billable Time: 45 minutes     Precautions:   AMBULATION AND WEIGHTBEARING STATUS:  The patient should be full weightbearing   as tolerated with the right knee in a hinged knee immobilizer locked in -10   degrees hyperextension with gait.     RANGE OF MOTION:  No range of motion for the first 2 weeks following surgery.    Initiating range of motion from 0 to 30 degrees at 2 weeks and 0-45 degrees at 3   weeks and 0 and 90 degrees at 5 weeks and full range of motion after 6 weeks   postoperative rehabilitation.    Date of Surgery: 7/10/18      Subjective     Pt reports no new c/o this PM in R knee, willing to attempt Jo-Ann as tolerated :    .   He was compliant with home exercise program.  Response to previous treatment: improved quad activation   Functional change:no changes     Pain: 2/10  Location: right knee  Patellar tendon     Objective     PO 64    Measurements:  (Loss of motion in extension 0/5/130)    Fredy received therapeutic exercises to develop strength, endurance, ROM, flexibility and posture for 45 minutes one one one with PT including:    Bike x 10' seat at 8 5.0 resist  HSS 5x;15  QS 1x10:10   Sit up SLR 1x10:05 0#  Supine SLR 1x10:10 0#  U bridge with opp LE resting on table 1x20:05   Supine PNF D1/D2 3x15  Self ROM k' flex prone + RF and prone 5x:15 each  Prone ext hang 3'x1 0#    Pt declined use of CP     Home Exercises Provided and Patient Education Provided     Education provided:   -  None this visit     Written  Home Exercises Provided: Patient instructed to cont prior HEP.  Exercises were reviewed and Fredy was able to demonstrate them prior to the end of the session.  Fredy demonstrated good  understanding of the education provided.     Assessment     Pt elida Rx without increase in sxs  ROM progressing in k' flexion    .   Pt prognosis is Good.     Pt will continue to benefit from skilled outpatient physical therapy to address the deficits listed in the problem list box on initial evaluation, provide pt/family education and to maximize pt's level of independence in the home and community environment.     Pt's spiritual, cultural and educational needs considered and pt agreeable to plan of care and goals.    Anticipated barriers to physical therapy: none       GOALS: Short Term Goals: 6-8 weeks  1.Report decreased left knee pain < / = 5/10 to increase tolerance for ADLs and strengthening exercises.(met, 8/8/18)   2. Increase knee ROM to 0-120 degrees (passive flexion) in order to be able to perform ADLs without difficulty.(progressing, not met)  3. Increase strength in left quad in order to perform 20 straight leg raises without difficulty in order to ambulate safely full weight-bearing with brace.(progressing, not met)  4. Pt to tolerate HEP to improve ROM and independence with ADL's.(progressing, not met)     Long Term Goals: 16-24 weeks  1. Report decreased left knee pain < / = 2/10 to increase tolerance for return to full sport/agility activities as needed.(progressing, not met)  2. Patient goal: Return to full functional mobility and recreational activities without pain or difficulty.(progressing, not met)  3. Increase strength to = 5/5 in gross bilateral lower extremities to increase tolerance for sport and plyometric activities.(progressing, not met)  4. Demonstrate single limb squat with proper biomechanics in order to safely return to daily and recreational activities.(progressing, not met)  5. Patient will be  comfortable with long-term home exercise program for continued safety with sport participation.(progressing, not met)      Plan     Continue with established Plan of Care towards PT goals with focus on decreasing pain, increasing ROM, strength, neuromuscular control and functional status     Luisito Jimenez, PT

## 2018-09-19 ENCOUNTER — CLINICAL SUPPORT (OUTPATIENT)
Dept: REHABILITATION | Facility: HOSPITAL | Age: 48
End: 2018-09-19
Payer: COMMERCIAL

## 2018-09-19 DIAGNOSIS — M25.561 CHRONIC PAIN OF RIGHT KNEE: Primary | ICD-10-CM

## 2018-09-19 DIAGNOSIS — G89.29 CHRONIC PAIN OF RIGHT KNEE: Primary | ICD-10-CM

## 2018-09-19 PROCEDURE — 97110 THERAPEUTIC EXERCISES: CPT | Performed by: PHYSICAL THERAPIST

## 2018-09-20 NOTE — PROGRESS NOTES
Physical Therapy Daily Treatment Note     Name: Fredy Taylor Jr.  Clinic Number: [id number]    Therapy Diagnosis:chronic pain on R knee   Physician: Keila Moody PA-C    Visit Date: 9/19/2018  Physician Orders: PT Eval and Treat   Medical Diagnosis: s/p ACL repair  Evaluation Date: 8/1/2018  Authorization Period Expiration: 12/31/18  Plan of Care Certification Period: 2/1/2019                          Visit # / Visits authorized: 8 / 52     Time In:15:30   Time Out: 16:30  Total Billable Time: 45 minutes     Precautions:   AMBULATION AND WEIGHTBEARING STATUS:  The patient should be full weightbearing   as tolerated with the right knee in a hinged knee immobilizer locked in -10   degrees hyperextension with gait.     RANGE OF MOTION:  No range of motion for the first 2 weeks following surgery.    Initiating range of motion from 0 to 30 degrees at 2 weeks and 0-45 degrees at 3   weeks and 0 and 90 degrees at 5 weeks and full range of motion after 6 weeks   postoperative rehabilitation.    Date of Surgery: 7/10/18      Subjective     Pt reports continued progress in his R knee condition  :    .   He was compliant with home exercise program.  Response to previous treatment: improved quad activation   Functional change:no changes     Pain: 2/10  Location: right knee  Patellar tendon     Objective     PO 71    Measurements:  (Loss of motion in extension 0/5/130)    Fredy received therapeutic exercises to develop strength, endurance, ROM, flexibility and posture for 45 minutes one one one with PT including:    Bike x 10' seat at 8 5.0 resist  HSS 5x;15  QS 1x10:10   Sit up SLR 1x10:05 0#  Supine SLR 1x10:10 0#  U bridge with opp LE resting on table 1x20:05   Hip abd band walks 3xburn R/L blue  LP 80# 3x10 U  HS ham curl 3x15 U 10#  SLS 5x:15 EO foam   Self ROM k' flex prone + RF and prone 5x:15 each  Prone ext hang 3'x1 0#    Pt declined use of CP     Home Exercises Provided and Patient Education Provided      Education provided:   -  None this visit     Written Home Exercises Provided: Patient instructed to cont prior HEP.  Exercises were reviewed and Fredy was able to demonstrate them prior to the end of the session.  Fredy demonstrated good  understanding of the education provided.     Assessment     Pt elida Rx without increase in sxs improving hamstring strength   .   Pt prognosis is Good.     Pt will continue to benefit from skilled outpatient physical therapy to address the deficits listed in the problem list box on initial evaluation, provide pt/family education and to maximize pt's level of independence in the home and community environment.     Pt's spiritual, cultural and educational needs considered and pt agreeable to plan of care and goals.    Anticipated barriers to physical therapy: none       GOALS: Short Term Goals: 6-8 weeks  1.Report decreased left knee pain < / = 5/10 to increase tolerance for ADLs and strengthening exercises.(met, 8/8/18)   2. Increase knee ROM to 0-120 degrees (passive flexion) in order to be able to perform ADLs without difficulty.(progressing, not met)  3. Increase strength in left quad in order to perform 20 straight leg raises without difficulty in order to ambulate safely full weight-bearing with brace.(progressing, not met)  4. Pt to tolerate HEP to improve ROM and independence with ADL's.(progressing, not met)     Long Term Goals: 16-24 weeks  1. Report decreased left knee pain < / = 2/10 to increase tolerance for return to full sport/agility activities as needed.(progressing, not met)  2. Patient goal: Return to full functional mobility and recreational activities without pain or difficulty.(progressing, not met)  3. Increase strength to = 5/5 in gross bilateral lower extremities to increase tolerance for sport and plyometric activities.(progressing, not met)  4. Demonstrate single limb squat with proper biomechanics in order to safely return to daily and recreational  activities.(progressing, not met)  5. Patient will be comfortable with long-term home exercise program for continued safety with sport participation.(progressing, not met)      Plan     Continue with established Plan of Care towards PT goals with focus on decreasing pain, increasing ROM, strength, neuromuscular control and functional status     Luisito Jimenez, PT

## 2018-09-26 ENCOUNTER — CLINICAL SUPPORT (OUTPATIENT)
Dept: REHABILITATION | Facility: HOSPITAL | Age: 48
End: 2018-09-26
Payer: COMMERCIAL

## 2018-09-26 DIAGNOSIS — M25.561 CHRONIC PAIN OF RIGHT KNEE: Primary | ICD-10-CM

## 2018-09-26 DIAGNOSIS — G89.29 CHRONIC PAIN OF RIGHT KNEE: Primary | ICD-10-CM

## 2018-09-26 PROCEDURE — 97110 THERAPEUTIC EXERCISES: CPT | Performed by: PHYSICAL THERAPIST

## 2018-09-26 NOTE — PROGRESS NOTES
"  Physical Therapy Daily Treatment Note     Name: Fredy Taylor Jr.  Clinic Number: [id number]    Therapy Diagnosis:chronic pain on R knee   Physician: Keila Moody PA-C    Visit Date: 9/26/2018  Physician Orders: PT Eval and Treat   Medical Diagnosis: s/p ACL repair  Evaluation Date: 8/1/2018  Authorization Period Expiration: 12/31/18  Plan of Care Certification Period: 2/1/2019                          Visit # / Visits authorized: 9 / 52     Time In:15:30   Time Out: 16:30  Total Billable Time: 45 minutes     Precautions:   AMBULATION AND WEIGHTBEARING STATUS:  The patient should be full weightbearing   as tolerated with the right knee in a hinged knee immobilizer locked in -10   degrees hyperextension with gait.     RANGE OF MOTION:  No range of motion for the first 2 weeks following surgery.    Initiating range of motion from 0 to 30 degrees at 2 weeks and 0-45 degrees at 3   weeks and 0 and 90 degrees at 5 weeks and full range of motion after 6 weeks   postoperative rehabilitation.    Date of Surgery: 7/10/18      Subjective     Pt reports soreness in medial region of R knee this PM but wiling to attempt Jo-Ann as tolerated   .   He was compliant with home exercise program.  Response to previous treatment: improved quad activation   Functional change:no changes     Pain: 2-3/10  Location: right knee  Patellar tendon     Objective     PO 78    Measurements:  (Loss of motion in extension 0/5/130)    Fredy received therapeutic exercises to develop strength, endurance, ROM, flexibility and posture for 45 minutes one one one with PT including:    Bike x 5' seat at 8 5.0 resist  ET x 5'   HSS 5x;15  QS 1x10:10   Sit up SLR 1x10:05 0#  Supine SLR 1x10:10 0#  U bridge with opp LE resting on table 1x20:05  R/L  SLS bench touch 2x5 k' bent  "  " k' straight " "  SLS hip hiking 2x15 R/L   Self ROM k' flex prone + RF and prone 5x:15 each  Prone ext hang 3'x1 0#    CP to go     Home Exercises Provided and Patient " Education Provided     Education provided:   -  None this visit     Written Home Exercises Provided: Patient instructed to cont prior HEP.  Exercises were reviewed and Fredy was able to demonstrate them prior to the end of the session.  Fredy demonstrated good  understanding of the education provided.     Assessment     Pt elida Rx without increase in sxs poor to fair balance    .   Pt prognosis is Good.     Pt will continue to benefit from skilled outpatient physical therapy to address the deficits listed in the problem list box on initial evaluation, provide pt/family education and to maximize pt's level of independence in the home and community environment.     Pt's spiritual, cultural and educational needs considered and pt agreeable to plan of care and goals.    Anticipated barriers to physical therapy: none       GOALS: Short Term Goals: 6-8 weeks  1.Report decreased left knee pain < / = 5/10 to increase tolerance for ADLs and strengthening exercises.(met, 8/8/18)   2. Increase knee ROM to 0-120 degrees (passive flexion) in order to be able to perform ADLs without difficulty.(progressing, not met)  3. Increase strength in left quad in order to perform 20 straight leg raises without difficulty in order to ambulate safely full weight-bearing with brace.(progressing, not met)  4. Pt to tolerate HEP to improve ROM and independence with ADL's.(progressing, not met)     Long Term Goals: 16-24 weeks  1. Report decreased left knee pain < / = 2/10 to increase tolerance for return to full sport/agility activities as needed.(progressing, not met)  2. Patient goal: Return to full functional mobility and recreational activities without pain or difficulty.(progressing, not met)  3. Increase strength to = 5/5 in gross bilateral lower extremities to increase tolerance for sport and plyometric activities.(progressing, not met)  4. Demonstrate single limb squat with proper biomechanics in order to safely return to daily and  recreational activities.(progressing, not met)  5. Patient will be comfortable with long-term home exercise program for continued safety with sport participation.(progressing, not met)      Plan     Continue with established Plan of Care towards PT goals with focus on decreasing pain, increasing ROM, strength, neuromuscular control and functional status     Luisito Jimenez, PT

## 2018-10-03 ENCOUNTER — OFFICE VISIT (OUTPATIENT)
Dept: SPORTS MEDICINE | Facility: CLINIC | Age: 48
End: 2018-10-03
Payer: COMMERCIAL

## 2018-10-03 VITALS
BODY MASS INDEX: 32.9 KG/M2 | SYSTOLIC BLOOD PRESSURE: 149 MMHG | HEART RATE: 97 BPM | HEIGHT: 71 IN | DIASTOLIC BLOOD PRESSURE: 94 MMHG | WEIGHT: 235 LBS

## 2018-10-03 DIAGNOSIS — S83.511D RUPTURE OF ANTERIOR CRUCIATE LIGAMENT OF RIGHT KNEE, SUBSEQUENT ENCOUNTER: Primary | ICD-10-CM

## 2018-10-03 DIAGNOSIS — G89.29 CHRONIC PAIN OF RIGHT KNEE: ICD-10-CM

## 2018-10-03 DIAGNOSIS — M23.91 ACUTE INTERNAL DERANGEMENT OF RIGHT KNEE: ICD-10-CM

## 2018-10-03 DIAGNOSIS — S83.511A RUPTURE OF ANTERIOR CRUCIATE LIGAMENT OF RIGHT KNEE, INITIAL ENCOUNTER: ICD-10-CM

## 2018-10-03 DIAGNOSIS — M25.561 CHRONIC PAIN OF RIGHT KNEE: ICD-10-CM

## 2018-10-03 PROCEDURE — 99999 PR PBB SHADOW E&M-EST. PATIENT-LVL IV: CPT | Mod: PBBFAC,,, | Performed by: ORTHOPAEDIC SURGERY

## 2018-10-03 PROCEDURE — 99024 POSTOP FOLLOW-UP VISIT: CPT | Mod: S$GLB,,, | Performed by: ORTHOPAEDIC SURGERY

## 2018-10-03 RX ORDER — IBUPROFEN 600 MG/1
600 TABLET ORAL 3 TIMES DAILY
COMMUNITY

## 2018-10-03 NOTE — PROGRESS NOTES
Subjective:          Chief Complaint: Fredy Taylor Jr. is a 47 y.o. male who had concerns including Pain of the Right Knee.    Patient is here for 3 month post op of ACL repair on 7/10/18. Pain today is 1-2/10. He is out of the  T-scope brace. 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 HE would like to progress to light duty. He is off the  ASA 325mg once a day and Celebrex 200mg BID. He is taking ibuprofen 600mg prn  He has been going to PT with Luisito Jimenez.    DATE OF PROCEDURE:  07/10/2011     ATTENDING SURGEON:  Ryland Coffey M.D.     FIRST ASSISTANT:  Crow Lozada M.D. (RES)     SECOND ASSISTANT:  SMA Zach-assistant.     PREOPERATIVE DIAGNOSIS:  Right knee anterior cruciate ligament tear.     POSTOPERATIVE DIAGNOSES:  1.  Right knee anterior cruciate ligament tear (proximal avulsion).  2.  Right knee synovitis.  3.  Right knee chondromalacia.     OPERATIVE PROCEDURES PERFORMED:  1.  Right knee anterior cruciate ligament repair/augmentation, CPT code 47918.  2.  Right knee arthroscopic synovectomy, CPT code 06641.  3.  Right knee arthroscopic chondroplasty, CPT code 07199.  4.  Right knee has arthrocentesis.  CPT code 72052.               Review of Systems   Constitution: Negative. Negative for chills, fever, weight gain and weight loss.   HENT: Negative for congestion and sore throat.    Eyes: Negative for blurred vision and double vision.   Cardiovascular: Negative for chest pain, leg swelling and palpitations.   Respiratory: Negative for cough and shortness of breath.    Hematologic/Lymphatic: Does not bruise/bleed easily.   Skin: Negative for itching, poor wound healing and rash.   Musculoskeletal: Positive for joint pain, joint swelling and stiffness. Negative for back pain, muscle weakness and myalgias.   Gastrointestinal: Negative for abdominal pain, constipation, diarrhea, nausea and vomiting.   Genitourinary: Negative.  Negative for frequency and hematuria.    Neurological: Negative for dizziness, headaches, numbness, paresthesias and sensory change.   Psychiatric/Behavioral: Negative for altered mental status and depression. The patient is not nervous/anxious.    Allergic/Immunologic: Negative for hives.       Pain Related Questions  Over the past 3 days, what was your average pain during activity? (I.e. running, jogging, walking, climbing stairs, getting dressed, ect.): 4  Over the past 3 days, what was your highest pain level?: 4  Over the past 3 days, what was your lowest pain level? : 2    Other  How many nights a week are you awakened by your affected body part?: 0  Was the patient's HEIGHT measured or patient reported?: Patient Reported  Was the patient's WEIGHT measured or patient reported?: Measured      Objective:        General: Fredy is well-developed, well-nourished, appears stated age, in no acute distress, alert and oriented to time, place and person.     General    Vitals reviewed.  Constitutional: He is oriented to person, place, and time. He appears well-developed and well-nourished. No distress.   HENT:   Head: Normocephalic and atraumatic.   Mouth/Throat: No oropharyngeal exudate.   Eyes: EOM are normal. Right eye exhibits no discharge. Left eye exhibits no discharge.   Neck: Normal range of motion.   Cardiovascular: Normal rate and regular rhythm.    Pulmonary/Chest: Effort normal and breath sounds normal. No respiratory distress.   Neurological: He is alert and oriented to person, place, and time. He has normal reflexes. No cranial nerve deficit. Coordination normal.   Psychiatric: He has a normal mood and affect. His behavior is normal. Judgment and thought content normal.     General Musculoskeletal Exam   Gait: normal and antalgic       Right Knee Exam     Inspection   Erythema: absent  Scars: present  Swelling: absent  Effusion: absent  Deformity: absent  Bruising: absent    Tenderness   The patient is tender to palpation of the medial joint  line, lateral joint line and medial hamstring.    Range of Motion   Extension:  0 normal   Flexion: 120     Tests   Ligament Examination Lachman: normal (-1 to 2mm) PCL-Posterior Drawer: normal (0 to 2mm)     MCL - Valgus: normal (0 to 2mm)  LCL - Varus: normalReverse Pivot Shift: normal (Equal)  Patella   Patellar apprehension: negative  Passive Patellar Tilt: neutral  Patellar Tracking: normal  Patellar Glide (quadrants): Lateral - 1   Medial - 2  Q-Angle at 90 degrees: normal  Patellar Grind: negative  J-Sign: none    Other   Muscle Tightness: hamstring tightness  Sensation: normal    Comments:  Incisions well healed.    Left Knee Exam     Inspection   Erythema: absent  Scars: absent  Swelling: absent  Deformity: absent  Bruising: absent    Tenderness   The patient is experiencing no tenderness.     Range of Motion   Extension:  0 normal   Flexion:  140 normal     Tests   Meniscus   Mikel:  Medial - negative Lateral - negative  Stability Lachman: normal (-1 to 2mm) PCL-Posterior Drawer: normal (0 to 2mm)  MCL - Valgus: normal (0 to 2mm)  LCL - Varus: normal (0 to 2mm)Pivot Shift: normal (Equal)Reverse Pivot Shift: normal (Equal)  Posterior Sag Test: negative  Posterolateral Corner: unstable (>15 degrees difference)  Patella   Patellar apprehension: negative  Passive Patellar Tilt: neutral  Patellar Tracking: normal  Patellar Glide (Quadrants): Lateral - 1 Medial - 2  Q-Angle at 90 degrees: normal  Patellar Grind: negative  J-Sign: J sign absent    Other   Meniscal Cyst: absent  Popliteal (Baker's) Cyst: absent  Sensation: normal    Right Hip Exam     Tests   Justine: negative  Left Hip Exam     Tests   Justine: negative          Muscle Strength   Right Lower Extremity   Hip Abduction: 5/5   Quadriceps:  4/5   Hamstrin/5   Left Lower Extremity   Hip Abduction: 5/5   Quadriceps:  5/5   Hamstrin/5     Reflexes     Left Side  Quadriceps:  2+  Achilles:  2+    Right Side   Quadriceps:  2+  Achilles:   2+    Vascular Exam     Right Pulses  Dorsalis Pedis:      2+ and 1+  Posterior Tibial:      2+        Left Pulses  Dorsalis Pedis:      2+  Posterior Tibial:      2+        Edema  Right Lower Leg: absent                Assessment:       Encounter Diagnoses   Name Primary?    Rupture of anterior cruciate ligament of right knee, subsequent encounter Yes    Acute internal derangement of right knee     Rupture of anterior cruciate ligament of right knee, initial encounter     Chronic pain of right knee           Plan:       1. IKDC, SF-12 and KOOS was not filled out today in clinic.     RTC in 12 weeks with Dr. Ryland Coffey for 6 month post op appt. Patient will fill out IKDC, SF-12 and KOOS on return. Bilateral knee xrays    2. Continue PT with Luisito Jimenez, new rx sent    3. Cont NSAIDs as needed    4. [id number] - carmela gaytan, performed a custom orthotic ACL brace adjustment, fitting and training with the patient. The patient demonstrated understanding and proper care. This was performed for 15 minutes.      5. May progress to light duty in brace      Patient questionnaires may have been collected.

## 2018-10-03 NOTE — LETTER
Patient: Fredy Taylor Jr.   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 Even though the weight lifted may be only a negligible amount, a job is in this category when it requires walking or standing to a significant degree, or when it involves sitting most of the time with a degree of pushing and pulling of arm and/or leg controls.    Therapy Recomendations: Physical Therapy 1 times a week for 12 weeks.         Next Appointment: Follow up 12 weeks. Fredy will be seen by Dr. Ryland Coffey.    Comments: Must be  allowed to wear brace while working      ____________________________         October 3, 2018    Ryland Coffey MD  Signed (Provider)

## 2018-10-04 ENCOUNTER — CLINICAL SUPPORT (OUTPATIENT)
Dept: REHABILITATION | Facility: HOSPITAL | Age: 48
End: 2018-10-04
Payer: COMMERCIAL

## 2018-10-04 DIAGNOSIS — G89.29 CHRONIC PAIN OF RIGHT KNEE: Primary | ICD-10-CM

## 2018-10-04 DIAGNOSIS — M25.561 CHRONIC PAIN OF RIGHT KNEE: Primary | ICD-10-CM

## 2018-10-04 PROCEDURE — 97110 THERAPEUTIC EXERCISES: CPT | Performed by: PHYSICAL THERAPIST

## 2018-10-04 NOTE — PROGRESS NOTES
"  Physical Therapy Daily Treatment Note     Name: Fredy Taylor Jr.  Clinic Number: [id number]    Therapy Diagnosis:chronic pain on R knee   Physician: Keila Moody PA-C    Visit Date: 10/4/2018  Physician Orders: PT Eval and Treat   Medical Diagnosis: s/p ACL repair  Evaluation Date: 8/1/2018  Authorization Period Expiration: 12/31/18  Plan of Care Certification Period: 2/1/2019                          Visit # / Visits authorized: 10 / 52     Time In:15:30   Time Out: 16:30  Total Billable Time: 45 minutes     Precautions:   AMBULATION AND WEIGHTBEARING STATUS:  The patient should be full weightbearing   as tolerated with the right knee in a hinged knee immobilizer locked in -10   degrees hyperextension with gait.     RANGE OF MOTION:  No range of motion for the first 2 weeks following surgery.    Initiating range of motion from 0 to 30 degrees at 2 weeks and 0-45 degrees at 3   weeks and 0 and 90 degrees at 5 weeks and full range of motion after 6 weeks   postoperative rehabilitation.    Date of Surgery: 7/10/18      Subjective     Pt reports soreness in medial region of R knee this PM but wiling to attempt Jo-Ann as tolerated   .   He was compliant with home exercise program.  Response to previous treatment: improved quad activation   Functional change:no changes     Pain: 1/10  Location: right knee  Patellar tendon     Objective     PO 86    Measurements:   Good quad tone, trace to 0 effusion, full k' flexion achieved  (Loss of motion in extension 0/5/130)    Fredy received therapeutic exercises to develop strength, endurance, ROM, flexibility and posture for 45 minutes including:    Bike x 10' seat at 8 5.0 resist  HSS 5x;15   Sit up SLR 1x10:05 0#  Supine SLR 1x10:10 0#  3D hams red PB 3x10   Ball squat 3x10:05   HS k' ext 90-45 3x10 5# U  LSU 3" 3x10   Self ROM k' flex prone + RF and prone 5x:15 each  Prone ext hang 3'x1 4#    CP  X 10'     Home Exercises Provided and Patient Education Provided "     Education provided:   -  None this visit     Written Home Exercises Provided: Patient instructed to cont prior HEP.  Exercises were reviewed and Fredy was able to demonstrate them prior to the end of the session.  Fredy demonstrated good  understanding of the education provided.     Assessment     Pt elida Rx without increase in sxs,  Progressing as expected     Pt prognosis is Good.     Pt will continue to benefit from skilled outpatient physical therapy to address the deficits listed in the problem list box on initial evaluation, provide pt/family education and to maximize pt's level of independence in the home and community environment.     Pt's spiritual, cultural and educational needs considered and pt agreeable to plan of care and goals.    Anticipated barriers to physical therapy: none       GOALS: Short Term Goals: 6-8 weeks  (ALL MET)  1.Report decreased left knee pain < / = 5/10 to increase tolerance for ADLs and strengthening exercises.(met, 8/8/18)   2. Increase knee ROM to 0-120 degrees (passive flexion) in order to be able to perform ADLs without difficulty.(progressing, not met)  3. Increase strength in left quad in order to perform 20 straight leg raises without difficulty in order to ambulate safely full weight-bearing with brace.(progressing, not met)  4. Pt to tolerate HEP to improve ROM and independence with ADL's.(progressing, not met)     Long Term Goals: 16-24 weeks  1. Report decreased left knee pain < / = 2/10 to increase tolerance for return to full sport/agility activities as needed.(progressing, not met)  2. Patient goal: Return to full functional mobility and recreational activities without pain or difficulty.(progressing, not met)  3. Increase strength to = 5/5 in gross bilateral lower extremities to increase tolerance for sport and plyometric activities.(progressing, not met)  4. Demonstrate single limb squat with proper biomechanics in order to safely return to daily and  recreational activities.(progressing, not met)  5. Patient will be comfortable with long-term home exercise program for continued safety with sport participation.(progressing, not met)      Plan     Continue with established Plan of Care towards PT goals with focus on decreasing pain, increasing ROM, strength, neuromuscular control and functional status     Luisito Jimenez, PT

## 2018-10-11 ENCOUNTER — CLINICAL SUPPORT (OUTPATIENT)
Dept: REHABILITATION | Facility: HOSPITAL | Age: 48
End: 2018-10-11
Payer: COMMERCIAL

## 2018-10-11 DIAGNOSIS — M25.561 CHRONIC PAIN OF RIGHT KNEE: Primary | ICD-10-CM

## 2018-10-11 DIAGNOSIS — G89.29 CHRONIC PAIN OF RIGHT KNEE: Primary | ICD-10-CM

## 2018-10-11 PROCEDURE — 97110 THERAPEUTIC EXERCISES: CPT | Performed by: PHYSICAL THERAPIST

## 2018-10-14 NOTE — PROGRESS NOTES
Physical Therapy Daily Treatment Note     Name: Fredy Taylor Jr.  Clinic Number: [id number]    Therapy Diagnosis:chronic pain on R knee   Physician: Keila Moody PA-C    Visit Date: 10/11/2018  Physician Orders: PT Eval and Treat   Medical Diagnosis: s/p ACL repair  Evaluation Date: 8/1/2018  Authorization Period Expiration: 12/31/18  Plan of Care Certification Period: 2/1/2019                          Visit # / Visits authorized: 11 / 52     Time In:15:30   Time Out: 16:30  Total Billable Time: 45 minutes     Precautions:   AMBULATION AND WEIGHTBEARING STATUS:  The patient should be full weightbearing   as tolerated with the right knee in a hinged knee immobilizer locked in -10   degrees hyperextension with gait.     RANGE OF MOTION:  No range of motion for the first 2 weeks following surgery.    Initiating range of motion from 0 to 30 degrees at 2 weeks and 0-45 degrees at 3   weeks and 0 and 90 degrees at 5 weeks and full range of motion after 6 weeks   postoperative rehabilitation.    Date of Surgery: 7/10/18      Subjective     Pt reports no new c/o this AM in R knee, willing to attempt Jo-Ann as tolerated   .   He was compliant with home exercise program.  Response to previous treatment: improved quad activation   Functional change:no changes     Pain: 0/10 at rest this PM  Location: right knee       Objective     PO 93    Measurements:   None taken this  PM    Fredy received therapeutic exercises to develop strength, endurance, ROM, flexibility and posture for 45 minutes including:    ET x 5-10'   HSS 5x;15  GSS incline board 5x:15  Supine RF stretch 5x;15   Supine SLR 1x10:05 3#  LP U 3x10 80# 3x10   Ball squat 3x10:05  HS ham curl 3x10 B 25$   HS k' ext 90-45 3x10 5# U  Self ROM k' flex prone + RF and prone 5x:15 each  Prone ext hang 3'x1 4#    CP  X 10'     Home Exercises Provided and Patient Education Provided     Education provided:   -  None this visit     Written Home Exercises Provided: Patient  instructed to cont prior HEP.  Exercises were reviewed and Fredy was able to demonstrate them prior to the end of the session.  Fredy demonstrated good  understanding of the education provided.     Assessment     Pt elida Rx without increase in sxs, slight anterior knee pain with ball squats, decreased with hip hinge    Pt prognosis is Good.     Pt will continue to benefit from skilled outpatient physical therapy to address the deficits listed in the problem list box on initial evaluation, provide pt/family education and to maximize pt's level of independence in the home and community environment.     Pt's spiritual, cultural and educational needs considered and pt agreeable to plan of care and goals.    Anticipated barriers to physical therapy: none       GOALS: Short Term Goals: 6-8 weeks  (ALL MET)  1.Report decreased left knee pain < / = 5/10 to increase tolerance for ADLs and strengthening exercises.(met, 8/8/18)   2. Increase knee ROM to 0-120 degrees (passive flexion) in order to be able to perform ADLs without difficulty.(progressing, not met)  3. Increase strength in left quad in order to perform 20 straight leg raises without difficulty in order to ambulate safely full weight-bearing with brace.(progressing, not met)  4. Pt to tolerate HEP to improve ROM and independence with ADL's.(progressing, not met)     Long Term Goals: 16-24 weeks  1. Report decreased left knee pain < / = 2/10 to increase tolerance for return to full sport/agility activities as needed.(progressing, not met)  2. Patient goal: Return to full functional mobility and recreational activities without pain or difficulty.(progressing, not met)  3. Increase strength to = 5/5 in gross bilateral lower extremities to increase tolerance for sport and plyometric activities.(progressing, not met)  4. Demonstrate single limb squat with proper biomechanics in order to safely return to daily and recreational activities.(progressing, not met)  5. Patient  will be comfortable with long-term home exercise program for continued safety with sport participation.(progressing, not met)      Plan     Continue with established Plan of Care towards PT goals with focus on decreasing pain, increasing ROM, strength, neuromuscular control and functional status     Luisito Jimenez, PT

## 2018-10-18 ENCOUNTER — CLINICAL SUPPORT (OUTPATIENT)
Dept: REHABILITATION | Facility: HOSPITAL | Age: 48
End: 2018-10-18
Payer: COMMERCIAL

## 2018-10-18 DIAGNOSIS — M25.561 CHRONIC PAIN OF RIGHT KNEE: Primary | ICD-10-CM

## 2018-10-18 DIAGNOSIS — G89.29 CHRONIC PAIN OF RIGHT KNEE: Primary | ICD-10-CM

## 2018-10-18 PROCEDURE — 97110 THERAPEUTIC EXERCISES: CPT | Performed by: PHYSICAL THERAPIST

## 2018-10-18 PROCEDURE — 97530 THERAPEUTIC ACTIVITIES: CPT | Performed by: PHYSICAL THERAPIST

## 2018-10-18 NOTE — PROGRESS NOTES
Physical Therapy Daily Treatment Note     Name: Fredy Taylor Jr.  Clinic Number: [id number]    Therapy Diagnosis:chronic pain on R knee   Physician: Keila Moody PA-C    Visit Date: 10/18/2018  Physician Orders: PT Eval and Treat   Medical Diagnosis: s/p ACL repair  Evaluation Date: 8/1/2018  Authorization Period Expiration: 12/31/18  Plan of Care Certification Period: 2/1/2019                          Visit # / Visits authorized: 12 / 52     Time In:15:30   Time Out: 16:30  Total Billable Time: 45 minutes     Precautions:   AMBULATION AND WEIGHTBEARING STATUS:  The patient should be full weightbearing   as tolerated with the right knee in a hinged knee immobilizer locked in -10   degrees hyperextension with gait.     RANGE OF MOTION:  No range of motion for the first 2 weeks following surgery.    Initiating range of motion from 0 to 30 degrees at 2 weeks and 0-45 degrees at 3   weeks and 0 and 90 degrees at 5 weeks and full range of motion after 6 weeks   postoperative rehabilitation.    Date of Surgery: 7/10/18      Subjective     Pt reports that he is able to do all work responsibilities and his only c/o at this time is kneeling and would like to play with his grand children     He was compliant with home exercise program.  Response to previous treatment: improved quad activation   Functional change:no changes     Pain: 0/10 at rest this PM  Location: right knee       Objective         Measurements:   Full ROM, MMT: quad 4/5 ham 5/5    Patient participated in dynamic functional therapeutic activities to improve functional performance for 45  minutes. Including:     TM 8% incline x 10'   Shuttle jumps B 3x15 2b  Ladder short 5D x 2   Prone ham curls 3xburn blue   HS k' ext 7.5# 3xburn U    CP  To go     Home Exercises Provided and Patient Education Provided     Education provided:   -  None this visit     Written Home Exercises Provided: Patient instructed to cont prior HEP.  Exercises were  reviewed and Fredy was able to demonstrate them prior to the end of the session.  Fredy demonstrated good  understanding of the education provided.     Assessment     Pt elida Rx without increase in sxs, able to jog / B jump on shuttle without deviation/limiation     Pt prognosis is Good.     Pt's spiritual, cultural and educational needs considered and pt agreeable to plan of care and goals.    Anticipated barriers to physical therapy: none       GOALS: Short Term Goals: 6-8 weeks  (ALL MET)  1.Report decreased left knee pain < / = 5/10 to increase tolerance for ADLs and strengthening exercises.(met, 8/8/18)   2. Increase knee ROM to 0-120 degrees (passive flexion) in order to be able to perform ADLs without difficulty.(progressing, not met)  3. Increase strength in left quad in order to perform 20 straight leg raises without difficulty in order to ambulate safely full weight-bearing with brace.(progressing, not met)  4. Pt to tolerate HEP to improve ROM and independence with ADL's.(progressing, not met)     Long Term Goals: 16-24 weeks (MET or moving toward achievement)  1. Report decreased left knee pain < / = 2/10 to increase tolerance for return to full sport/agility activities as needed.(progressing, not met)  2. Patient goal: Return to full functional mobility and recreational activities without pain or difficulty.(progressing, not met)  3. Increase strength to = 5/5 in gross bilateral lower extremities to increase tolerance for sport and plyometric activities.(progressing, not met)  4. Demonstrate single limb squat with proper biomechanics in order to safely return to daily and recreational activities.(progressing, not met)  5. Patient will be comfortable with long-term home exercise program for continued safety with sport participation.(progressing, not met)      Plan     DC secondary to progress, pt to continue with HEP and will contact MD or PT should sxs reoccur     Luisito Jimenez, PT

## 2018-10-29 ENCOUNTER — TELEPHONE (OUTPATIENT)
Dept: SPORTS MEDICINE | Facility: CLINIC | Age: 48
End: 2018-10-29

## 2018-10-29 ENCOUNTER — HOSPITAL ENCOUNTER (OUTPATIENT)
Dept: RADIOLOGY | Facility: HOSPITAL | Age: 48
Discharge: HOME | End: 2018-10-29
Attending: PHYSICIAN ASSISTANT
Payer: COMMERCIAL

## 2018-10-29 ENCOUNTER — OFFICE VISIT (OUTPATIENT)
Dept: SPORTS MEDICINE | Facility: CLINIC | Age: 48
End: 2018-10-29
Payer: COMMERCIAL

## 2018-10-29 VITALS
DIASTOLIC BLOOD PRESSURE: 77 MMHG | HEART RATE: 107 BPM | BODY MASS INDEX: 32.9 KG/M2 | HEIGHT: 71 IN | WEIGHT: 235 LBS | SYSTOLIC BLOOD PRESSURE: 158 MMHG

## 2018-10-29 DIAGNOSIS — Z98.890 S/P ACL REPAIR: ICD-10-CM

## 2018-10-29 DIAGNOSIS — M25.561 RIGHT KNEE PAIN, UNSPECIFIED CHRONICITY: Primary | ICD-10-CM

## 2018-10-29 DIAGNOSIS — M25.561 RIGHT KNEE PAIN, UNSPECIFIED CHRONICITY: ICD-10-CM

## 2018-10-29 DIAGNOSIS — M25.461 EFFUSION OF RIGHT KNEE: ICD-10-CM

## 2018-10-29 PROCEDURE — 99999 PR PBB SHADOW E&M-EST. PATIENT-LVL IV: CPT | Mod: PBBFAC,,, | Performed by: PHYSICIAN ASSISTANT

## 2018-10-29 PROCEDURE — 99214 OFFICE O/P EST MOD 30 MIN: CPT | Mod: S$GLB,,, | Performed by: PHYSICIAN ASSISTANT

## 2018-10-29 PROCEDURE — 3008F BODY MASS INDEX DOCD: CPT | Mod: CPTII,S$GLB,, | Performed by: PHYSICIAN ASSISTANT

## 2018-10-29 PROCEDURE — 3078F DIAST BP <80 MM HG: CPT | Mod: CPTII,S$GLB,, | Performed by: PHYSICIAN ASSISTANT

## 2018-10-29 PROCEDURE — 3077F SYST BP >= 140 MM HG: CPT | Mod: CPTII,S$GLB,, | Performed by: PHYSICIAN ASSISTANT

## 2018-10-29 PROCEDURE — 73564 X-RAY EXAM KNEE 4 OR MORE: CPT | Mod: 26,50,, | Performed by: RADIOLOGY

## 2018-10-29 PROCEDURE — 73564 X-RAY EXAM KNEE 4 OR MORE: CPT | Mod: TC,50,FY,PO

## 2018-10-29 RX ORDER — MELOXICAM 15 MG/1
15 TABLET ORAL DAILY
Qty: 30 TABLET | Refills: 1 | Status: SHIPPED | OUTPATIENT
Start: 2018-10-29 | End: 2018-11-28

## 2018-10-29 NOTE — PROGRESS NOTES
Subjective:          Chief Complaint: Fredy Taylor Jr. is a 48 y.o. male who had concerns including Pain of the Right Knee.    Patient is here for unscheduled 3.5 month post op of ACL repair on 7/10/18. He is here today because he jumped off the back of a trailer 3 days ago and he felt immediate pain (9/10) in his right knee and had an effusion.  Pain today is 6/10 with walking. He put the T-scope brace on. He has been icing and taking Ibuprofen as needed for pain. He was discharged from PT on 10/18/18.     DATE OF PROCEDURE:  07/10/2018     ATTENDING SURGEON:  Ryland Coffey M.D.     FIRST ASSISTANT:  Crow Lozada M.D. (RES)     SECOND ASSISTANT:  SMA Zach-assistant.     PREOPERATIVE DIAGNOSIS:  Right knee anterior cruciate ligament tear.     POSTOPERATIVE DIAGNOSES:  1.  Right knee anterior cruciate ligament tear (proximal avulsion).  2.  Right knee synovitis.  3.  Right knee chondromalacia.     OPERATIVE PROCEDURES PERFORMED:  1.  Right knee anterior cruciate ligament repair/augmentation, CPT code 21613.  2.  Right knee arthroscopic synovectomy, CPT code 49136.  3.  Right knee arthroscopic chondroplasty, CPT code 18381.  4.  Right knee has arthrocentesis.  CPT code 65487.        Pain   Associated symptoms include joint swelling. Pertinent negatives include no abdominal pain, chest pain, chills, congestion, coughing, fever, headaches, myalgias, nausea, numbness, rash, sore throat or vomiting.          Review of Systems   Constitution: Negative. Negative for chills, fever, weight gain and weight loss.   HENT: Negative for congestion and sore throat.    Eyes: Negative for blurred vision and double vision.   Cardiovascular: Negative for chest pain, leg swelling and palpitations.   Respiratory: Negative for cough and shortness of breath.    Hematologic/Lymphatic: Does not bruise/bleed easily.   Skin: Negative for itching, poor wound healing and rash.   Musculoskeletal: Positive for joint pain,  joint swelling and stiffness. Negative for back pain, muscle weakness and myalgias.   Gastrointestinal: Negative for abdominal pain, constipation, diarrhea, nausea and vomiting.   Genitourinary: Negative.  Negative for frequency and hematuria.   Neurological: Negative for dizziness, headaches, numbness, paresthesias and sensory change.   Psychiatric/Behavioral: Negative for altered mental status and depression. The patient is not nervous/anxious.    Allergic/Immunologic: Negative for hives.       Pain Related Questions  Over the past 3 days, what was your average pain during activity? (I.e. running, jogging, walking, climbing stairs, getting dressed, ect.): 6  Over the past 3 days, what was your highest pain level?: 6  Over the past 3 days, what was your lowest pain level? : 6    Other  How many nights a week are you awakened by your affected body part?: 7      Objective:        General: Fredy is well-developed, well-nourished, appears stated age, in no acute distress, alert and oriented to time, place and person.     General    Vitals reviewed.  Constitutional: He is oriented to person, place, and time. He appears well-developed and well-nourished. No distress.   HENT:   Head: Normocephalic and atraumatic.   Mouth/Throat: No oropharyngeal exudate.   Eyes: EOM are normal. Right eye exhibits no discharge. Left eye exhibits no discharge.   Neck: Normal range of motion.   Cardiovascular: Normal rate and regular rhythm.    Pulmonary/Chest: Effort normal and breath sounds normal. No respiratory distress.   Neurological: He is alert and oriented to person, place, and time. He has normal reflexes. No cranial nerve deficit. Coordination normal.   Psychiatric: He has a normal mood and affect. His behavior is normal. Judgment and thought content normal.     General Musculoskeletal Exam   Gait: normal and antalgic       Right Knee Exam     Inspection   Erythema: absent  Scars: present  Swelling: present  Effusion:  present  Deformity: absent  Bruising: absent    Tenderness   The patient is tender to palpation of the medial joint line, lateral joint line and medial hamstring.    Range of Motion   Extension:  0 normal   Right knee flexion: 125.     Tests   Meniscus   Mikel:  Medial - negative Lateral - negative  Ligament Examination   Lachman: abnormal - grade IIPCL-Posterior Drawer: normal (0 to 2mm)     MCL - Valgus: normal (0 to 2mm)  LCL - Varus: normalReverse Pivot Shift: normal (Equal)  Patella   Patellar apprehension: negative  Passive Patellar Tilt: neutral  Patellar Tracking: normal  Patellar Glide (quadrants): Lateral - 1   Medial - 2  Q-Angle at 90 degrees: normal  Patellar Grind: negative  J-Sign: none    Other   Muscle Tightness: hamstring tightness  Sensation: normal    Comments:  Incisions well healed.    Left Knee Exam     Inspection   Erythema: absent  Scars: absent  Swelling: absent  Deformity: absent  Bruising: absent    Tenderness   The patient is experiencing no tenderness.     Range of Motion   Extension:  0 normal   Flexion:  140 normal     Tests   Meniscus   Mikel:  Medial - negative Lateral - negative  Stability Lachman: normal (-1 to 2mm) PCL-Posterior Drawer: normal (0 to 2mm)  MCL - Valgus: normal (0 to 2mm)  LCL - Varus: normal (0 to 2mm)Pivot Shift: normal (Equal)Reverse Pivot Shift: normal (Equal)  Posterior Sag Test: negative  Posterolateral Corner: unstable (>15 degrees difference)  Patella   Patellar apprehension: negative  Passive Patellar Tilt: neutral  Patellar Tracking: normal  Patellar Glide (Quadrants): Lateral - 1 Medial - 2  Q-Angle at 90 degrees: normal  Patellar Grind: negative  J-Sign: J sign absent    Other   Meniscal Cyst: absent  Popliteal (Baker's) Cyst: absent  Sensation: normal    Right Hip Exam     Tests   Justine: negative  Left Hip Exam     Tests   Justine: negative          Muscle Strength   Right Lower Extremity   Hip Abduction: 5/5   Quadriceps:  4/5   Hamstrin/5    Left Lower Extremity   Hip Abduction: 5/5   Quadriceps:  5/5   Hamstrin/5     Reflexes     Left Side  Quadriceps:  2+  Achilles:  2+    Right Side   Quadriceps:  2+  Achilles:  2+    Vascular Exam     Right Pulses  Dorsalis Pedis:      2+ and 1+  Posterior Tibial:      2+        Left Pulses  Dorsalis Pedis:      2+  Posterior Tibial:      2+        Edema  Right Lower Leg: absent    RADIOGRAPHS:  Bilateral knees:  FINDINGS:  There is no evidence fracture or malalignment or joint effusion.  The adjacent soft tissues are unremarkable.        Assessment:       Encounter Diagnoses   Name Primary?    Right knee pain, unspecified chronicity Yes    Effusion of right knee     S/P ACL repair           Plan:       1. IKDC, SF-12 and KOOS was not filled out today in clinic.     RTC in 1 weeks with Dr. Ryland Coffey for MRI results of right knee. Patient will fill out IKDC, SF-12 and KOOS on return.     2. MRI of right knee to rule out ACL re-tear    3. Discontinue ibuprofen. Start Mobic 15 mg once a day prn pain    4. [id number] - carmela gaytan, performed a custom orthotic ACL brace adjustment, fitting and training with the patient. The patient demonstrated understanding and proper care. This was performed for 15 minutes.      5. I made the decision to obtain old records of the patient including previous notes and imaging. New imaging was ordered today of the extremity or extremities evaluated. I independently reviewed and interpreted the radiographs  today as well as prior imaging.           Patient questionnaires may have been collected.

## 2018-11-02 ENCOUNTER — HOSPITAL ENCOUNTER (OUTPATIENT)
Dept: RADIOLOGY | Facility: HOSPITAL | Age: 48
Discharge: HOME | End: 2018-11-02
Attending: PHYSICIAN ASSISTANT
Payer: COMMERCIAL

## 2018-11-02 DIAGNOSIS — M25.461 EFFUSION OF RIGHT KNEE: ICD-10-CM

## 2018-11-02 PROCEDURE — 73721 MRI JNT OF LWR EXTRE W/O DYE: CPT | Mod: TC,RT

## 2018-11-02 PROCEDURE — 73721 MRI JNT OF LWR EXTRE W/O DYE: CPT | Mod: 26,RT,, | Performed by: RADIOLOGY

## 2018-11-07 ENCOUNTER — OFFICE VISIT (OUTPATIENT)
Dept: SPORTS MEDICINE | Facility: CLINIC | Age: 48
End: 2018-11-07
Payer: COMMERCIAL

## 2018-11-07 VITALS
HEART RATE: 91 BPM | DIASTOLIC BLOOD PRESSURE: 92 MMHG | WEIGHT: 235 LBS | SYSTOLIC BLOOD PRESSURE: 145 MMHG | HEIGHT: 71 IN | BODY MASS INDEX: 32.9 KG/M2

## 2018-11-07 DIAGNOSIS — G89.29 CHRONIC PAIN OF RIGHT KNEE: ICD-10-CM

## 2018-11-07 DIAGNOSIS — Z98.890 S/P ACL REPAIR: ICD-10-CM

## 2018-11-07 DIAGNOSIS — M23.91 ACUTE INTERNAL DERANGEMENT OF RIGHT KNEE: Primary | ICD-10-CM

## 2018-11-07 DIAGNOSIS — M25.561 CHRONIC PAIN OF RIGHT KNEE: ICD-10-CM

## 2018-11-07 PROCEDURE — 3008F BODY MASS INDEX DOCD: CPT | Mod: CPTII,S$GLB,, | Performed by: ORTHOPAEDIC SURGERY

## 2018-11-07 PROCEDURE — 3077F SYST BP >= 140 MM HG: CPT | Mod: CPTII,S$GLB,, | Performed by: ORTHOPAEDIC SURGERY

## 2018-11-07 PROCEDURE — 3080F DIAST BP >= 90 MM HG: CPT | Mod: CPTII,S$GLB,, | Performed by: ORTHOPAEDIC SURGERY

## 2018-11-07 PROCEDURE — 99214 OFFICE O/P EST MOD 30 MIN: CPT | Mod: S$GLB,,, | Performed by: ORTHOPAEDIC SURGERY

## 2018-11-07 PROCEDURE — 99999 PR PBB SHADOW E&M-EST. PATIENT-LVL III: CPT | Mod: PBBFAC,,, | Performed by: ORTHOPAEDIC SURGERY

## 2018-11-07 NOTE — PROGRESS NOTES
Subjective:          Chief Complaint: Fredy Taylor Jr. is a 48 y.o. male who had concerns including Pain of the Right Knee.    Patient is here for  4 month post op of right ACL repair on 7/10/18. He is here today for MRI results review of right knee. He is here today because he jumped off the back of a trailer last week, and he felt immediate pain (9/10) in his right knee and had an effusion.  Pain today is 2/10 with walking. He has been icing and taking Mobic 15 mg once a day as needed for pain. He was discharged from PT on 10/18/18. He no longer has an effusion in his right knee and is doing better.    DATE OF PROCEDURE:  07/10/2018     ATTENDING SURGEON:  Ryland Coffey M.D.     FIRST ASSISTANT:  Crow Lozada M.D. (RES)     SECOND ASSISTANT:  SMA Zach-assistant.     PREOPERATIVE DIAGNOSIS:  Right knee anterior cruciate ligament tear.     POSTOPERATIVE DIAGNOSES:  1.  Right knee anterior cruciate ligament tear (proximal avulsion).  2.  Right knee synovitis.  3.  Right knee chondromalacia.     OPERATIVE PROCEDURES PERFORMED:  1.  Right knee anterior cruciate ligament repair/augmentation, CPT code 64795.  2.  Right knee arthroscopic synovectomy, CPT code 70328.  3.  Right knee arthroscopic chondroplasty, CPT code 21651.  4.  Right knee has arthrocentesis.  CPT code 22830.        Pain   Associated symptoms include joint swelling. Pertinent negatives include no abdominal pain, chest pain, chills, congestion, coughing, fever, headaches, myalgias, nausea, numbness, rash, sore throat or vomiting.          Review of Systems   Constitution: Negative. Negative for chills, fever, weight gain and weight loss.   HENT: Negative for congestion and sore throat.    Eyes: Negative for blurred vision and double vision.   Cardiovascular: Negative for chest pain, leg swelling and palpitations.   Respiratory: Negative for cough and shortness of breath.    Hematologic/Lymphatic: Does not bruise/bleed easily.   Skin:  Negative for itching, poor wound healing and rash.   Musculoskeletal: Positive for joint pain, joint swelling and stiffness. Negative for back pain, muscle weakness and myalgias.   Gastrointestinal: Negative for abdominal pain, constipation, diarrhea, nausea and vomiting.   Genitourinary: Negative.  Negative for frequency and hematuria.   Neurological: Negative for dizziness, headaches, numbness, paresthesias and sensory change.   Psychiatric/Behavioral: Negative for altered mental status and depression. The patient is not nervous/anxious.    Allergic/Immunologic: Negative for hives.       Pain Related Questions  Over the past 3 days, what was your average pain during activity? (I.e. running, jogging, walking, climbing stairs, getting dressed, ect.): 4  Over the past 3 days, what was your highest pain level?: 4  Over the past 3 days, what was your lowest pain level? : 2    Other  How many nights a week are you awakened by your affected body part?: 0  Was the patient's HEIGHT measured or patient reported?: Patient Reported  Was the patient's WEIGHT measured or patient reported?: Measured      Objective:        General: Fredy is well-developed, well-nourished, appears stated age, in no acute distress, alert and oriented to time, place and person.     General    Vitals reviewed.  Constitutional: He is oriented to person, place, and time. He appears well-developed and well-nourished. No distress.   HENT:   Head: Normocephalic and atraumatic.   Mouth/Throat: No oropharyngeal exudate.   Eyes: EOM are normal. Right eye exhibits no discharge. Left eye exhibits no discharge.   Neck: Normal range of motion.   Cardiovascular: Normal rate and regular rhythm.    Pulmonary/Chest: Effort normal and breath sounds normal. No respiratory distress.   Neurological: He is alert and oriented to person, place, and time. He has normal reflexes. No cranial nerve deficit. Coordination normal.   Psychiatric: He has a normal mood and affect.  His behavior is normal. Judgment and thought content normal.     General Musculoskeletal Exam   Gait: normal and antalgic       Right Knee Exam     Inspection   Erythema: absent  Scars: present  Swelling: present  Effusion: absent  Deformity: absent  Bruising: absent    Tenderness   The patient is tender to palpation of the medial joint line and lateral joint line.    Range of Motion   Extension: normal Right knee extension thgthrthathdtheth:th th4th. Flexion:  140 normal     Tests   Meniscus   Mikel:  Medial - negative Lateral - negative  Ligament Examination   Lachman: abnormal - grade IPCL-Posterior Drawer: normal (0 to 2mm)     MCL - Valgus: normal (0 to 2mm)  LCL - Varus: normalPivot Shift: normal (Equal)Reverse Pivot Shift: normal (Equal)  Patella   Patellar apprehension: negative  Passive Patellar Tilt: neutral  Patellar Tracking: normal  Patellar Glide (quadrants): Lateral - 1   Medial - 2  Q-Angle at 90 degrees: normal  Patellar Grind: negative  J-Sign: none    Other   Muscle Tightness: hamstring tightness  Sensation: normal    Comments:  Incisions well healed.    Left Knee Exam     Inspection   Erythema: absent  Scars: absent  Swelling: absent  Effusion: absent  Deformity: absent  Bruising: absent    Tenderness   The patient is experiencing no tenderness.     Range of Motion   Extension: normal Left knee extension thgthrthathdtheth:th th4th. Flexion:  140 normal     Tests   Meniscus   Mikel:  Medial - negative Lateral - negative  Stability Lachman: normal (-1 to 2mm) PCL-Posterior Drawer: normal (0 to 2mm)  MCL - Valgus: normal (0 to 2mm)  LCL - Varus: normal (0 to 2mm)Pivot Shift: normal (Equal)Reverse Pivot Shift: normal (Equal)  Posterior Sag Test: negative  Posterolateral Corner: unstable (>15 degrees difference)  Patella   Patellar apprehension: negative  Passive Patellar Tilt: neutral  Patellar Tracking: normal  Patellar Glide (Quadrants): Lateral - 1 Medial - 2  Q-Angle at 90 degrees: normal  Patellar Grind:  negative  J-Sign: J sign absent    Other   Meniscal Cyst: absent  Popliteal (Baker's) Cyst: absent  Sensation: normal    Right Hip Exam     Tests   Justine: negative  Left Hip Exam     Tests   Justine: negative          Muscle Strength   Right Lower Extremity   Hip Abduction: 5/5   Quadriceps:  4/5   Hamstrin/5   Left Lower Extremity   Hip Abduction: 5/5   Quadriceps:  5/5   Hamstrin/5     Reflexes     Left Side  Quadriceps:  2+  Achilles:  2+    Right Side   Quadriceps:  2+  Achilles:  2+    Vascular Exam     Right Pulses  Dorsalis Pedis:      2+ and 1+  Posterior Tibial:      2+        Left Pulses  Dorsalis Pedis:      2+  Posterior Tibial:      2+        Edema  Right Lower Leg: absent    RADIOGRAPHS:  Bilateral knees:  FINDINGS:  There is no evidence fracture or malalignment or joint effusion.  The adjacent soft tissues are unremarkable.    Right knee MRI:  FINDINGS:  Surgical change of prior ACL repair.    Menisci:  There is new increased signal extending to the inferior articular margin of the posterior horn of the lateral meniscus could be degenerative but concerning for a oblique horizontal tear.The root attachment of the menisci are normal.    Ligaments:  There is a complete tear of the ACL.  PCL appears intact with slight buckling.MCL, and LCL complex are intact.    Tendons:  The quadriceps and patellar tendons are normal.    Cartilage:    Patellofemoral: Cartilage fissuring of the medial and lateral facets with subchondral cystic degenerative change.    Medial tibiofemoral: Partial-thickness cartilage fissuring.    Lateral tibiofemoral: Articular cartilage is maintained.    Bone: Edema of the lateral aspect of the tibial plateau.    Miscellaneous: Small joint effusion.Posterior medial and posterior lateral corners of the knee are intact.The gastrocnemius muscles are normal.        Assessment:       Encounter Diagnoses   Name Primary?    Acute internal derangement of right knee Yes    Chronic pain of  right knee     S/P ACL repair           Plan:       1. IKDC, SF-12 and KOOS was not filled out today in clinic.     RTC in 6 weeks with Dr. Ryland Coffey for follow up right knee. Patient will fill out IKDC, SF-12 and KOOS on return.     2. Reviewed MRI with patient. MRI shows ACL rupture. On, exam, ACL feels stable.    3. Continue  Mobic 15 mg once a day prn pain    4. Continue orthotic ACL brace       5. Referral to PT at [facility] with Luisito Jimenez. Re-initate therapy for 6 weeks. If no improvement, will move forward with surgery below:    6. We reviewed with Fredy today, the pathology and natural history of his diagnosis. We have discussed a variety of treatment options including medications, physical therapy and other alternative treatments. I also explained the indications, risks and benefits of surgery. After discussion, Fredy decided to proceed with surgery. The decision was made to go forward with: IF NO IMPROVEMENT FROM PT for 6 weeks, NSAIDS, and REST    1. Right knee diagnostic arthroscopy  2. Right knee arthroscopic medial/lateral menisectomy  3. Right knee arthroscopic chondroplasty  4. Right knee possible ACL reconstruction with Achilles allograft  5. Right knee possible arthroscopic Artelon graft augmentation    The details of the surgical procedure were explained, including the location of probable incisions and a description of likely hardware and/or grafts to be used.  The patient understands the likely convalescence after surgery.  Also, we have thoroughly discussed the risks, benefits and alternatives to surgery, including, but not limited to, the risk of infection, joint stiffness, blood clot (including DVT and/or pulmonary embolus), neurologic and vascular injury.  It was explained that, if tissue has been repaired or reconstructed, there is a chance of failure, which may require further management.      All of the patient's questions were answered and informed consent was obtained. The patient  will contact us if they have any questions or concerns in the interim.  '            Patient questionnaires may have been collected.

## 2018-11-15 ENCOUNTER — CLINICAL SUPPORT (OUTPATIENT)
Dept: REHABILITATION | Facility: HOSPITAL | Age: 48
End: 2018-11-15
Payer: COMMERCIAL

## 2018-11-15 DIAGNOSIS — G89.29 CHRONIC PAIN OF RIGHT KNEE: Primary | ICD-10-CM

## 2018-11-15 DIAGNOSIS — M25.561 CHRONIC PAIN OF RIGHT KNEE: Primary | ICD-10-CM

## 2018-11-15 PROCEDURE — 97110 THERAPEUTIC EXERCISES: CPT | Performed by: PHYSICAL THERAPIST

## 2018-11-15 NOTE — PROGRESS NOTES
Physical Therapy Daily Treatment Note     Name: Fredy Taylor Jr.  Clinic Number: [id number]    Therapy Diagnosis:chronic pain on R knee   Physician: Ryland Coffey MD    Visit Date: 11/15/2018  Physician Orders: PT Eval and Treat   Medical Diagnosis: s/p ACL repair  Evaluation Date: 8/1/2018  Authorization Period Expiration: 12/31/18  Plan of Care Certification Period: 2/1/2019                          Visit # / Visits authorized: 13 / 52     Time In: 16:30   Time Out: 17:30  Total Billable Time: 45 minutes     Precautions:   AMBULATION AND WEIGHTBEARING STATUS:  The patient should be full weightbearing   as tolerated with the right knee in a hinged knee immobilizer locked in -10   degrees hyperextension with gait.     RANGE OF MOTION:  No range of motion for the first 2 weeks following surgery.    Initiating range of motion from 0 to 30 degrees at 2 weeks and 0-45 degrees at 3   weeks and 0 and 90 degrees at 5 weeks and full range of motion after 6 weeks   postoperative rehabilitation.    Date of Surgery: 7/10/18      Subjective     Pt reports jumping down off a height and feeling pain in R knee, was seen by MD and checked out but was sent back to PT for continued strengthening  This PM, pt reports significant improvement in his R knee condition      He was compliant with home exercise program.  Response to previous treatment: improved quad activation   Functional change:no changes     Pain: 0/10 at rest this PM  Location: right knee       Objective         Measurements:   Neg effusion, Full ROM, MMT: quad 4/5 ham 5/5    Patient received  therapy to increase ROM/strength x 45'  with  activities as follows:     Stick upper/lower leg  Bike x 10'   HSS 5x;15  Sit up SLR 1x10:05 0#  Supine SLR 1x10:10 0#  Marching 2x10   Clamshell 2x15:05 green  2D hams 3x10 red PB  SLS foam 5x:15  Prone self stretch k' flex + RF 5x:15  St k' flex stretch 3x:15    Pt declined use of CP      Home Exercises Provided and  Patient Education Provided     Education provided:   -  None this visit     Written Home Exercises Provided: Patient instructed to cont prior HEP.  Exercises were reviewed and Fredy was able to demonstrate them prior to the end of the session.  Fredy demonstrated good  understanding of the education provided.     Assessment     Pt elida Rx without increase in sxs, good training effect in quad/ham/hip musculature without pain knee knee     Pt prognosis is Good.     Pt's spiritual, cultural and educational needs considered and pt agreeable to plan of care and goals.    Anticipated barriers to physical therapy: none       GOALS: Short Term Goals: 6-8 weeks  (ALL MET)  1.Report decreased left knee pain < / = 5/10 to increase tolerance for ADLs and strengthening exercises.(met, 8/8/18)   2. Increase knee ROM to 0-120 degrees (passive flexion) in order to be able to perform ADLs without difficulty.(progressing, not met)  3. Increase strength in left quad in order to perform 20 straight leg raises without difficulty in order to ambulate safely full weight-bearing with brace.(progressing, not met)  4. Pt to tolerate HEP to improve ROM and independence with ADL's.(progressing, not met)     Long Term Goals: 16-24 weeks (MET or moving toward achievement)  1. Report decreased left knee pain < / = 2/10 to increase tolerance for return to full sport/agility activities as needed.(progressing, not met)  2. Patient goal: Return to full functional mobility and recreational activities without pain or difficulty.(progressing, not met)  3. Increase strength to = 5/5 in gross bilateral lower extremities to increase tolerance for sport and plyometric activities.(progressing, not met)  4. Demonstrate single limb squat with proper biomechanics in order to safely return to daily and recreational activities.(progressing, not met)  5. Patient will be comfortable with long-term home exercise program for continued safety with sport  participation.(progressing, not met)      Plan         Luisito Jimenez, PT

## 2018-11-29 ENCOUNTER — CLINICAL SUPPORT (OUTPATIENT)
Dept: REHABILITATION | Facility: HOSPITAL | Age: 48
End: 2018-11-29
Payer: COMMERCIAL

## 2018-11-29 DIAGNOSIS — M25.561 CHRONIC PAIN OF RIGHT KNEE: Primary | ICD-10-CM

## 2018-11-29 DIAGNOSIS — G89.29 CHRONIC PAIN OF RIGHT KNEE: Primary | ICD-10-CM

## 2018-11-29 PROCEDURE — 97110 THERAPEUTIC EXERCISES: CPT | Performed by: PHYSICAL THERAPIST

## 2018-11-29 NOTE — PROGRESS NOTES
"  Physical Therapy Daily Treatment Note     Name: Fredy Taylor Jr.  Clinic Number: [id number]    Therapy Diagnosis:chronic pain on R knee   Physician: Ryland Coffey MD    Visit Date: 11/29/2018  Physician Orders: PT Eval and Treat   Medical Diagnosis: s/p ACL repair  Evaluation Date: 8/1/2018  Authorization Period Expiration: 12/31/18  Plan of Care Certification Period: 2/1/2019                          Visit # / Visits authorized: 14 / 52     Time In: 15:30   Time Out: 16:30  Total Billable Time: 45 minutes     Precautions:   AMBULATION AND WEIGHTBEARING STATUS:  The patient should be full weightbearing   as tolerated with the right knee in a hinged knee immobilizer locked in -10   degrees hyperextension with gait.     RANGE OF MOTION:  No range of motion for the first 2 weeks following surgery.    Initiating range of motion from 0 to 30 degrees at 2 weeks and 0-45 degrees at 3   weeks and 0 and 90 degrees at 5 weeks and full range of motion after 6 weeks   postoperative rehabilitation.    Date of Surgery: 7/10/18      Subjective     Pt reports "My knee is doing a lot better  Pt feels his R knee is 75% at this time        He was compliant with home exercise program.  Response to previous treatment: improved quad activation   Functional change:no changes     Pain: 0/10 at rest this PM  Location: right knee       Objective         Measurements:   Neg effusion, Full ROM, MMT: quad 4/5 ham 5/5    Patient received  therapy to increase ROM/strength x 45'  with  activities as follows:     Bike x 5'   ET x 5'   Sit up TKE to SLR 1x10:05 0#  Single leg squat 3x15  Prone ham curls MR 3x15  LSU + hip abd green TB 4" 3x10  HS K' ext 3x10 7.5# 90-0  Balance on tramp 3x:45  Step onto round BOSU 2x10    Pt declined use of CP      Home Exercises Provided and Patient Education Provided     Education provided:   -  None this visit     Written Home Exercises Provided: Patient instructed to cont prior HEP.  Exercises were " reviewed and Fredy was able to demonstrate them prior to the end of the session.  Fredy demonstrated good  understanding of the education provided.     Assessment     Pt elida Rx without increase in sxs, muscle weakness in R quad     Pt prognosis is Good.     Pt's spiritual, cultural and educational needs considered and pt agreeable to plan of care and goals.    Anticipated barriers to physical therapy: none       GOALS: Short Term Goals: 6-8 weeks  (ALL MET)  1.Report decreased left knee pain < / = 5/10 to increase tolerance for ADLs and strengthening exercises.(met, 8/8/18)   2. Increase knee ROM to 0-120 degrees (passive flexion) in order to be able to perform ADLs without difficulty.(progressing, not met)  3. Increase strength in left quad in order to perform 20 straight leg raises without difficulty in order to ambulate safely full weight-bearing with brace.(progressing, not met)  4. Pt to tolerate HEP to improve ROM and independence with ADL's.(progressing, not met)     Long Term Goals: 16-24 weeks (MET or moving toward achievement)  1. Report decreased left knee pain < / = 2/10 to increase tolerance for return to full sport/agility activities as needed.(progressing, not met)  2. Patient goal: Return to full functional mobility and recreational activities without pain or difficulty.(progressing, not met)  3. Increase strength to = 5/5 in gross bilateral lower extremities to increase tolerance for sport and plyometric activities.(progressing, not met)  4. Demonstrate single limb squat with proper biomechanics in order to safely return to daily and recreational activities.(progressing, not met)  5. Patient will be comfortable with long-term home exercise program for continued safety with sport participation.(progressing, not met)      Plan     Continue with established Plan of Care towards PT goals with focus on decreasing pain, increasing ROM, strength, neuromuscular control and functional status     Luisito Jimenez,  PT

## 2018-12-11 NOTE — PROGRESS NOTES
"  Physical Therapy Daily Treatment Note     Name: Fredy Taylor Jr.  Clinic Number: [id number]    Therapy Diagnosis:chronic pain on R knee   Physician: Ryland Coffey MD    Visit Date: 12/12/2018  Physician Orders: PT Eval and Treat   Medical Diagnosis: s/p ACL repair  Evaluation Date: 8/1/2018  Authorization Period Expiration: 12/31/18  Plan of Care Certification Period: 2/1/2019                          Visit # / Visits authorized: 15 / 52     Time In: 15:30   Time Out: 16:30  Total Billable Time: 45 minutes     Precautions:   AMBULATION AND WEIGHTBEARING STATUS:  The patient should be full weightbearing   as tolerated with the right knee in a hinged knee immobilizer locked in -10   degrees hyperextension with gait.     RANGE OF MOTION:  No range of motion for the first 2 weeks following surgery.    Initiating range of motion from 0 to 30 degrees at 2 weeks and 0-45 degrees at 3   weeks and 0 and 90 degrees at 5 weeks and full range of motion after 6 weeks   postoperative rehabilitation.    Date of Surgery: 7/10/18      Subjective     Pt reports increased stability in R knee from working on balance at home "It hasn't been giving way"     (Pt feels his R knee is 75% at this time  )      He was compliant with home exercise program.  Response to previous treatment: improved quad activation   Functional change:no changes     Pain: 0/10 at rest this PM  Location: right knee       Objective         Measurements:   None this PM    Patient received  therapy to increase ROM/strength x 45'  with  activities as follows:         Dynamic warm up  SLS ground touch 2x5 k' bent and 2x5 k' straight  y excursion PM / PL 2x5 each on y balance kit   Hip hikers red PB 3x15 R/L     CP x 10'       Home Exercises Provided and Patient Education Provided     Education provided:   -  None this visit     Written Home Exercises Provided: Patient instructed to cont prior HEP.  Exercises were reviewed and Fredy was able to demonstrate " them prior to the end of the session.  Fredy demonstrated good  understanding of the education provided.     Assessment     Pt elida Rx without increase in sxs,  Significant improvement in mobility and balance, all short term goals achieved, long term goals moving toward achievement     Pt prognosis is Good.     Pt's spiritual, cultural and educational needs considered and pt agreeable to plan of care and goals.    Anticipated barriers to physical therapy: none       GOALS: Short Term Goals: 6-8 weeks  (ALL MET)  1.Report decreased left knee pain < / = 5/10 to increase tolerance for ADLs and strengthening exercises.(met, 8/8/18)   2. Increase knee ROM to 0-120 degrees (passive flexion) in order to be able to perform ADLs without difficulty.(progressing, not met)  3. Increase strength in left quad in order to perform 20 straight leg raises without difficulty in order to ambulate safely full weight-bearing with brace.(progressing, not met)  4. Pt to tolerate HEP to improve ROM and independence with ADL's.(progressing, not met)     Long Term Goals: 16-24 weeks (MET or moving toward achievement)  1. Report decreased left knee pain < / = 2/10 to increase tolerance for return to full sport/agility activities as needed.(progressing, not met)  2. Patient goal: Return to full functional mobility and recreational activities without pain or difficulty.(progressing, not met)  3. Increase strength to = 5/5 in gross bilateral lower extremities to increase tolerance for sport and plyometric activities.(progressing, not met)  4. Demonstrate single limb squat with proper biomechanics in order to safely return to daily and recreational activities.(progressing, not met)  5. Patient will be comfortable with long-term home exercise program for continued safety with sport participation.(progressing, not met)      Plan     Pt requested to move to HEP at this time and will contact MD or PT should sxs reoccur   He is thus DC'd at this time       Luisito Jimenez, PT

## 2018-12-12 ENCOUNTER — CLINICAL SUPPORT (OUTPATIENT)
Dept: REHABILITATION | Facility: HOSPITAL | Age: 48
End: 2018-12-12
Payer: COMMERCIAL

## 2018-12-12 DIAGNOSIS — G89.29 CHRONIC PAIN OF RIGHT KNEE: Primary | ICD-10-CM

## 2018-12-12 DIAGNOSIS — M25.561 CHRONIC PAIN OF RIGHT KNEE: Primary | ICD-10-CM

## 2018-12-12 PROCEDURE — 97110 THERAPEUTIC EXERCISES: CPT | Performed by: PHYSICAL THERAPIST

## 2019-01-02 ENCOUNTER — OFFICE VISIT (OUTPATIENT)
Dept: SPORTS MEDICINE | Facility: CLINIC | Age: 49
End: 2019-01-02
Payer: COMMERCIAL

## 2019-01-02 ENCOUNTER — HOSPITAL ENCOUNTER (OUTPATIENT)
Dept: RADIOLOGY | Facility: HOSPITAL | Age: 49
Discharge: HOME | End: 2019-01-02
Attending: ORTHOPAEDIC SURGERY
Payer: COMMERCIAL

## 2019-01-02 VITALS
SYSTOLIC BLOOD PRESSURE: 140 MMHG | BODY MASS INDEX: 32.9 KG/M2 | DIASTOLIC BLOOD PRESSURE: 85 MMHG | HEART RATE: 94 BPM | WEIGHT: 235 LBS | HEIGHT: 71 IN

## 2019-01-02 DIAGNOSIS — G89.29 CHRONIC PAIN OF RIGHT KNEE: ICD-10-CM

## 2019-01-02 DIAGNOSIS — S83.511S RUPTURE OF ANTERIOR CRUCIATE LIGAMENT OF RIGHT KNEE, SEQUELA: ICD-10-CM

## 2019-01-02 DIAGNOSIS — M25.561 CHRONIC PAIN OF RIGHT KNEE: ICD-10-CM

## 2019-01-02 DIAGNOSIS — M23.91 ACUTE INTERNAL DERANGEMENT OF RIGHT KNEE: ICD-10-CM

## 2019-01-02 DIAGNOSIS — M23.91 ACUTE INTERNAL DERANGEMENT OF RIGHT KNEE: Primary | ICD-10-CM

## 2019-01-02 DIAGNOSIS — M25.561 CHRONIC PAIN OF RIGHT KNEE: Primary | ICD-10-CM

## 2019-01-02 DIAGNOSIS — G89.29 CHRONIC PAIN OF RIGHT KNEE: Primary | ICD-10-CM

## 2019-01-02 PROCEDURE — 73564 XR KNEE ORTHO BILAT WITH FLEXION: ICD-10-PCS | Mod: 26,50,, | Performed by: RADIOLOGY

## 2019-01-02 PROCEDURE — 99499 NO LOS: ICD-10-PCS | Mod: S$GLB,,, | Performed by: ORTHOPAEDIC SURGERY

## 2019-01-02 PROCEDURE — 3078F DIAST BP <80 MM HG: CPT | Mod: CPTII,S$GLB,, | Performed by: ORTHOPAEDIC SURGERY

## 2019-01-02 PROCEDURE — 3077F SYST BP >= 140 MM HG: CPT | Mod: CPTII,S$GLB,, | Performed by: ORTHOPAEDIC SURGERY

## 2019-01-02 PROCEDURE — 73564 X-RAY EXAM KNEE 4 OR MORE: CPT | Mod: TC,50,FY,PO

## 2019-01-02 PROCEDURE — 99999 PR PBB SHADOW E&M-EST. PATIENT-LVL II: ICD-10-PCS | Mod: PBBFAC,,, | Performed by: ORTHOPAEDIC SURGERY

## 2019-01-02 PROCEDURE — 73564 X-RAY EXAM KNEE 4 OR MORE: CPT | Mod: 26,50,, | Performed by: RADIOLOGY

## 2019-01-02 PROCEDURE — 99999 PR PBB SHADOW E&M-EST. PATIENT-LVL III: CPT | Mod: PBBFAC,,, | Performed by: ORTHOPAEDIC SURGERY

## 2019-01-02 PROCEDURE — 99999 PR PBB SHADOW E&M-EST. PATIENT-LVL III: ICD-10-PCS | Mod: PBBFAC,,, | Performed by: ORTHOPAEDIC SURGERY

## 2019-01-02 PROCEDURE — 3077F PR MOST RECENT SYSTOLIC BLOOD PRESSURE >= 140 MM HG: ICD-10-PCS | Mod: CPTII,S$GLB,, | Performed by: ORTHOPAEDIC SURGERY

## 2019-01-02 PROCEDURE — 3078F PR MOST RECENT DIASTOLIC BLOOD PRESSURE < 80 MM HG: ICD-10-PCS | Mod: CPTII,S$GLB,, | Performed by: ORTHOPAEDIC SURGERY

## 2019-01-02 PROCEDURE — 99214 OFFICE O/P EST MOD 30 MIN: CPT | Mod: S$GLB,,, | Performed by: ORTHOPAEDIC SURGERY

## 2019-01-02 PROCEDURE — 99499 UNLISTED E&M SERVICE: CPT | Mod: S$GLB,,, | Performed by: ORTHOPAEDIC SURGERY

## 2019-01-02 PROCEDURE — 99214 PR OFFICE/OUTPT VISIT, EST, LEVL IV, 30-39 MIN: ICD-10-PCS | Mod: S$GLB,,, | Performed by: ORTHOPAEDIC SURGERY

## 2019-01-02 PROCEDURE — 99999 PR PBB SHADOW E&M-EST. PATIENT-LVL II: CPT | Mod: PBBFAC,,, | Performed by: ORTHOPAEDIC SURGERY

## 2019-01-02 NOTE — LETTER
Patient: Fredy Taylor   YOB: 1970   Clinic Number: [id number]   Today's Date: January 2, 2019        Certificate to Return to Work     Fredy Tyler was seen by Ryland Coffey MD on 1/2/2019.    Follow-up in about 6 months (around 7/2/2019), or RTC in 6 months with Dr. Ryland Coffey for follow up right knee. Patient will fill out IKDC, SF-12 and, for RTC in 6 months with Dr. Ryland Coffey for follow up right knee. Patient will fill out IKDC, SF-12 and. Fredy Tyler will be seen by Dr. Ryland Coffey.    Fredy Tyler can return to work on 1/3/2019 with limited duty     Specific restrictions: no restrictions    If you have any questions or concerns, please feel free to contact the office at [phone].    Thank you.    Ryland Coffey MD

## 2019-01-02 NOTE — PROGRESS NOTES
Subjective:          Chief Complaint: Fredy Taylor Jr. is a 48 y.o. male who had concerns including Pain of the Right Knee.    Patient is here for  4 month post op of right ACL repair on 7/10/18. He is here today for MRI results review of right knee. He is here today because he jumped off the back of a trailer last week, and he felt immediate pain (9/10) in his right knee and had an effusion.  Pain today is 2/10 with walking. He has been icing and taking Mobic 15 mg once a day as needed for pain. He was discharged from PT on 10/18/18. He no longer has an effusion in his right knee and is doing better.    DATE OF PROCEDURE:  07/10/2018     ATTENDING SURGEON:  Ryland Coffey M.D.     FIRST ASSISTANT:  Crow Lozada M.D. (RES)     SECOND ASSISTANT:  SMA Zach-assistant.     PREOPERATIVE DIAGNOSIS:  Right knee anterior cruciate ligament tear.     POSTOPERATIVE DIAGNOSES:  1.  Right knee anterior cruciate ligament tear (proximal avulsion).  2.  Right knee synovitis.  3.  Right knee chondromalacia.     OPERATIVE PROCEDURES PERFORMED:  1.  Right knee anterior cruciate ligament repair/augmentation, CPT code 10042.  2.  Right knee arthroscopic synovectomy, CPT code 52850.  3.  Right knee arthroscopic chondroplasty, CPT code 02976.  4.  Right knee has arthrocentesis.  CPT code 53870.        Pain   Associated symptoms include joint swelling. Pertinent negatives include no abdominal pain, chest pain, chills, congestion, coughing, fever, headaches, myalgias, nausea, numbness, rash, sore throat or vomiting.          Review of Systems   Constitution: Negative. Negative for chills, fever, weight gain and weight loss.   HENT: Negative for congestion and sore throat.    Eyes: Negative for blurred vision and double vision.   Cardiovascular: Negative for chest pain, leg swelling and palpitations.   Respiratory: Negative for cough and shortness of breath.    Hematologic/Lymphatic: Does not bruise/bleed easily.   Skin:  Negative for itching, poor wound healing and rash.   Musculoskeletal: Positive for joint pain, joint swelling and stiffness. Negative for back pain, muscle weakness and myalgias.   Gastrointestinal: Negative for abdominal pain, constipation, diarrhea, nausea and vomiting.   Genitourinary: Negative.  Negative for frequency and hematuria.   Neurological: Negative for dizziness, headaches, numbness, paresthesias and sensory change.   Psychiatric/Behavioral: Negative for altered mental status and depression. The patient is not nervous/anxious.    Allergic/Immunologic: Negative for hives.                   Objective:        General: Fredy is well-developed, well-nourished, appears stated age, in no acute distress, alert and oriented to time, place and person.     General    Vitals reviewed.  Constitutional: He is oriented to person, place, and time. He appears well-developed and well-nourished. No distress.   HENT:   Head: Normocephalic and atraumatic.   Mouth/Throat: No oropharyngeal exudate.   Eyes: EOM are normal. Right eye exhibits no discharge. Left eye exhibits no discharge.   Neck: Normal range of motion.   Cardiovascular: Normal rate and regular rhythm.    Pulmonary/Chest: Effort normal and breath sounds normal. No respiratory distress.   Neurological: He is alert and oriented to person, place, and time. He has normal reflexes. No cranial nerve deficit. Coordination normal.   Psychiatric: He has a normal mood and affect. His behavior is normal. Judgment and thought content normal.     General Musculoskeletal Exam   Gait: normal and antalgic       Right Knee Exam     Inspection   Erythema: absent  Scars: present  Swelling: present  Effusion: absent  Deformity: absent  Bruising: absent    Tenderness   The patient is tender to palpation of the medial joint line and lateral joint line.    Range of Motion   Extension: normal Right knee extension thgthrthathdtheth:th th4th. Flexion:  140 normal     Tests   Meniscus   Mikel:  Medial  - negative Lateral - negative  Ligament Examination   Lachman: abnormal - grade IPCL-Posterior Drawer: normal (0 to 2mm)     MCL - Valgus: normal (0 to 2mm)  LCL - Varus: normalPivot Shift: normal (Equal)Reverse Pivot Shift: normal (Equal)  Patella   Patellar apprehension: negative  Passive Patellar Tilt: neutral  Patellar Tracking: normal  Patellar Glide (quadrants): Lateral - 1   Medial - 2  Q-Angle at 90 degrees: normal  Patellar Grind: negative  J-Sign: none    Other   Muscle Tightness: hamstring tightness  Sensation: normal    Comments:  Incisions well healed.    Left Knee Exam     Inspection   Erythema: absent  Scars: absent  Swelling: absent  Effusion: absent  Deformity: absent  Bruising: absent    Tenderness   The patient is experiencing no tenderness.     Range of Motion   Extension: normal Left knee extension ndgndrndanddndend:nd nd2nd. Flexion:  140 normal     Tests   Meniscus   Mikel:  Medial - negative Lateral - negative  Stability Lachman: normal (-1 to 2mm) PCL-Posterior Drawer: normal (0 to 2mm)  MCL - Valgus: normal (0 to 2mm)  LCL - Varus: normal (0 to 2mm)Pivot Shift: normal (Equal)Reverse Pivot Shift: normal (Equal)  Posterior Sag Test: negative  Posterolateral Corner: unstable (>15 degrees difference)  Patella   Patellar apprehension: negative  Passive Patellar Tilt: neutral  Patellar Tracking: normal  Patellar Glide (Quadrants): Lateral - 1 Medial - 2  Q-Angle at 90 degrees: normal  Patellar Grind: negative  J-Sign: J sign absent    Other   Meniscal Cyst: absent  Popliteal (Baker's) Cyst: absent  Sensation: normal    Right Hip Exam     Tests   Justine: negative  Left Hip Exam     Tests   Justine: negative          Muscle Strength   Right Lower Extremity   Hip Abduction: 5/5   Quadriceps:  4/5   Hamstrin/5   Left Lower Extremity   Hip Abduction: 5/5   Quadriceps:  5/5   Hamstrin/5     Reflexes     Left Side  Quadriceps:  2+  Achilles:  2+    Right Side   Quadriceps:  2+  Achilles:  2+    Vascular Exam      Right Pulses  Dorsalis Pedis:      2+ and 1+  Posterior Tibial:      2+        Left Pulses  Dorsalis Pedis:      2+  Posterior Tibial:      2+        Edema  Right Lower Leg: absent    RADIOGRAPHS:  Bilateral knees:  FINDINGS:  There is no evidence fracture or malalignment or joint effusion.  The adjacent soft tissues are unremarkable.    Right knee MRI:  FINDINGS:  Surgical change of prior ACL repair.    Menisci:  There is new increased signal extending to the inferior articular margin of the posterior horn of the lateral meniscus could be degenerative but concerning for a oblique horizontal tear.The root attachment of the menisci are normal.    Ligaments:  There is a complete tear of the ACL.  PCL appears intact with slight buckling.MCL, and LCL complex are intact.    Tendons:  The quadriceps and patellar tendons are normal.    Cartilage:    Patellofemoral: Cartilage fissuring of the medial and lateral facets with subchondral cystic degenerative change.    Medial tibiofemoral: Partial-thickness cartilage fissuring.    Lateral tibiofemoral: Articular cartilage is maintained.    Bone: Edema of the lateral aspect of the tibial plateau.    Miscellaneous: Small joint effusion.Posterior medial and posterior lateral corners of the knee are intact.The gastrocnemius muscles are normal.        Assessment:       Encounter Diagnoses   Name Primary?    Acute internal derangement of right knee Yes    Rupture of anterior cruciate ligament of right knee, sequela           Plan:       1. IKDC, SF-12 and KOOS was not filled out today in clinic.     RTC in 6 weeks with Dr. Ryland Coffey for follow up right knee. Patient will fill out IKDC, SF-12 and KOOS on return.     2. Reviewed MRI with patient. MRI shows ACL rupture. On, exam, ACL feels stable.    3. Continue  Mobic 15 mg once a day prn pain    4. Continue orthotic ACL brace       5. Referral to PT at [facility] with Luisito Jimenez. Re-initate therapy for 6 weeks. If no improvement,  will move forward with surgery below:    6. We reviewed with Fredy today, the pathology and natural history of his diagnosis. We have discussed a variety of treatment options including medications, physical therapy and other alternative treatments. I also explained the indications, risks and benefits of surgery. After discussion, Fredy decided to proceed with surgery. The decision was made to go forward with: IF NO IMPROVEMENT FROM PT for 6 weeks, NSAIDS, and REST    1. Right knee diagnostic arthroscopy  2. Right knee arthroscopic medial/lateral menisectomy  3. Right knee arthroscopic chondroplasty  4. Right knee possible ACL reconstruction with Achilles allograft  5. Right knee possible arthroscopic Artelon graft augmentation    The details of the surgical procedure were explained, including the location of probable incisions and a description of likely hardware and/or grafts to be used.  The patient understands the likely convalescence after surgery.  Also, we have thoroughly discussed the risks, benefits and alternatives to surgery, including, but not limited to, the risk of infection, joint stiffness, blood clot (including DVT and/or pulmonary embolus), neurologic and vascular injury.  It was explained that, if tissue has been repaired or reconstructed, there is a chance of failure, which may require further management.      All of the patient's questions were answered and informed consent was obtained. The patient will contact us if they have any questions or concerns in the interim.  '            Patient questionnaires may have been collected.

## 2019-01-02 NOTE — PROGRESS NOTES
Subjective:          Chief Complaint: Fredy Taylor Jr. is a 48 y.o. male who had no chief complaint listed for this encounter.    Patient is here for  4 month post op of right ACL repair on 7/10/18. He is here today for MRI results review of right knee. He is here today because he jumped off the back of a trailer last week, and he felt immediate pain (9/10) in his right knee and had an effusion.  Pain today is 2/10 with walking. He has been icing and taking Mobic 15 mg once a day as needed for pain. He was discharged from PT on 10/18/18. He no longer has an effusion in his right knee and is doing better.    DATE OF PROCEDURE:  07/10/2018     ATTENDING SURGEON:  Ryland Coffey M.D.     FIRST ASSISTANT:  Crow Lozada M.D. (RES)     SECOND ASSISTANT:  SMA Zach-assistant.     PREOPERATIVE DIAGNOSIS:  Right knee anterior cruciate ligament tear.     POSTOPERATIVE DIAGNOSES:  1.  Right knee anterior cruciate ligament tear (proximal avulsion).  2.  Right knee synovitis.  3.  Right knee chondromalacia.     OPERATIVE PROCEDURES PERFORMED:  1.  Right knee anterior cruciate ligament repair/augmentation, CPT code 52256.  2.  Right knee arthroscopic synovectomy, CPT code 41248.  3.  Right knee arthroscopic chondroplasty, CPT code 96835.  4.  Right knee has arthrocentesis.  CPT code 70641.        Pain   Associated symptoms include joint swelling. Pertinent negatives include no abdominal pain, chest pain, chills, congestion, coughing, fever, headaches, myalgias, nausea, numbness, rash, sore throat or vomiting.          Review of Systems   Constitution: Negative. Negative for chills, fever, weight gain and weight loss.   HENT: Negative for congestion and sore throat.    Eyes: Negative for blurred vision and double vision.   Cardiovascular: Negative for chest pain, leg swelling and palpitations.   Respiratory: Negative for cough and shortness of breath.    Hematologic/Lymphatic: Does not bruise/bleed easily.    Skin: Negative for itching, poor wound healing and rash.   Musculoskeletal: Positive for joint pain, joint swelling and stiffness. Negative for back pain, muscle weakness and myalgias.   Gastrointestinal: Negative for abdominal pain, constipation, diarrhea, nausea and vomiting.   Genitourinary: Negative.  Negative for frequency and hematuria.   Neurological: Negative for dizziness, headaches, numbness, paresthesias and sensory change.   Psychiatric/Behavioral: Negative for altered mental status and depression. The patient is not nervous/anxious.    Allergic/Immunologic: Negative for hives.                   Objective:        General: Fredy is well-developed, well-nourished, appears stated age, in no acute distress, alert and oriented to time, place and person.     General    Vitals reviewed.  Constitutional: He is oriented to person, place, and time. He appears well-developed and well-nourished. No distress.   HENT:   Head: Normocephalic and atraumatic.   Mouth/Throat: No oropharyngeal exudate.   Eyes: EOM are normal. Right eye exhibits no discharge. Left eye exhibits no discharge.   Neck: Normal range of motion.   Cardiovascular: Normal rate and regular rhythm.    Pulmonary/Chest: Effort normal and breath sounds normal. No respiratory distress.   Neurological: He is alert and oriented to person, place, and time. He has normal reflexes. No cranial nerve deficit. Coordination normal.   Psychiatric: He has a normal mood and affect. His behavior is normal. Judgment and thought content normal.     General Musculoskeletal Exam   Gait: normal and antalgic       Right Knee Exam     Inspection   Erythema: absent  Scars: present  Swelling: present  Effusion: absent  Deformity: absent  Bruising: absent    Tenderness   The patient is tender to palpation of the medial joint line and lateral joint line.    Range of Motion   Extension:  0 normal   Flexion:  140 normal     Tests   Meniscus   Mikel:  Medial - negative Lateral  - negative  Ligament Examination   Lachman: abnormal - grade IPCL-Posterior Drawer: normal (0 to 2mm)     MCL - Valgus: normal (0 to 2mm)  LCL - Varus: normalPivot Shift: normal (Equal)Reverse Pivot Shift: normal (Equal)Dial Test at 30 degrees: normal (< 5 degrees)Dial Test at 90 degrees: normal (< 5 degrees)  Posterior Sag Test: negative  Posterolateral Corner: unstable (>15 degrees difference)  Patella   Patellar apprehension: negative  Passive Patellar Tilt: neutral  Patellar Tracking: normal  Patellar Glide (quadrants): Lateral - 1   Medial - 2  Q-Angle at 90 degrees: normal  Patellar Grind: negative  J-Sign: none    Other   Meniscal Cyst: absent  Popliteal (Baker's) Cyst: absent  Muscle Tightness: hamstring tightness  Sensation: normal    Comments:  Incisions well healed.    Left Knee Exam     Inspection   Erythema: absent  Scars: absent  Swelling: absent  Effusion: absent  Deformity: absent  Bruising: absent    Tenderness   The patient is experiencing no tenderness.     Range of Motion   Extension: normal   Flexion:  140 normal     Tests   Meniscus   Mikel:  Medial - negative Lateral - negative  Stability Lachman: normal (-1 to 2mm) PCL-Posterior Drawer: normal (0 to 2mm)  MCL - Valgus: normal (0 to 2mm)  LCL - Varus: normal (0 to 2mm)Pivot Shift: normal (Equal)Reverse Pivot Shift: normal (Equal)Dial Test at 30 degrees: normal (< 5 degrees)Dial Test at 90 degrees: normal (< 5 degrees)  Posterior Sag Test: negative  Posterolateral Corner: unstable (>15 degrees difference)  Patella   Patellar apprehension: negative  Passive Patellar Tilt: neutral  Patellar Tracking: normal  Patellar Glide (Quadrants): Lateral - 1 Medial - 2  Q-Angle at 90 degrees: normal  Patellar Grind: negative  J-Sign: J sign absent    Other   Meniscal Cyst: absent  Popliteal (Baker's) Cyst: absent  Sensation: normal    Right Hip Exam     Tests   Justine: negative  Left Hip Exam     Tests   Justine: negative          Muscle Strength   Right  Lower Extremity   Hip Abduction: 5/5   Quadriceps:  4/5   Hamstrin/5   Left Lower Extremity   Hip Abduction: 5/5   Quadriceps:  5/5   Hamstrin/5     Reflexes     Left Side  Quadriceps:  2+  Achilles:  2+    Right Side   Quadriceps:  2+  Achilles:  2+    Vascular Exam     Right Pulses  Dorsalis Pedis:      2+ and 1+  Posterior Tibial:      2+        Left Pulses  Dorsalis Pedis:      2+  Posterior Tibial:      2+        Edema  Right Lower Leg: absent    RADIOGRAPHS:  Bilateral knees:  FINDINGS:  There is no evidence fracture or malalignment or joint effusion.  The adjacent soft tissues are unremarkable.    Right knee MRI:  FINDINGS:  Surgical change of prior ACL repair.    Menisci:  There is new increased signal extending to the inferior articular margin of the posterior horn of the lateral meniscus could be degenerative but concerning for a oblique horizontal tear.The root attachment of the menisci are normal.    Ligaments:  There is a complete tear of the ACL.  PCL appears intact with slight buckling.MCL, and LCL complex are intact.    Tendons:  The quadriceps and patellar tendons are normal.    Cartilage:    Patellofemoral: Cartilage fissuring of the medial and lateral facets with subchondral cystic degenerative change.    Medial tibiofemoral: Partial-thickness cartilage fissuring.    Lateral tibiofemoral: Articular cartilage is maintained.    Bone: Edema of the lateral aspect of the tibial plateau.    Miscellaneous: Small joint effusion.Posterior medial and posterior lateral corners of the knee are intact.The gastrocnemius muscles are normal.        Assessment:       Encounter Diagnoses   Name Primary?    Chronic pain of right knee Yes    Acute internal derangement of right knee     Rupture of anterior cruciate ligament of right knee, sequela           Plan:       1. IKDC, SF-12 and KOOS was not filled out today in clinic.     RTC in 6 months with Dr. Ryland Coffey for follow up right knee. Patient will  fill out IKDC, SF-12 and KOOS and bilateral knee series on return.     2. Reviewed MRI with patient. MRI shows ACL rupture. On, exam, ACL feels stable.    3. Continue  Mobic 15 mg once a day prn pain    4. Continue orthotic ACL brace       5. Referral to PT at [facility] with Luisito Jmienez. Re-initate therapy for 6 weeks. If no improvement, will move forward with surgery below:    6. We reviewed with Fredy today, the pathology and natural history of his diagnosis. We have discussed a variety of treatment options including medications, physical therapy and other alternative treatments. I also explained the indications, risks and benefits of surgery. After discussion, Fredy decided to proceed with surgery. The decision was made to go forward with: IF NO IMPROVEMENT FROM PT for 6 weeks, NSAIDS, and REST    1. Right knee diagnostic arthroscopy  2. Right knee arthroscopic medial/lateral menisectomy  3. Right knee arthroscopic chondroplasty  4. Right knee possible ACL reconstruction with Achilles allograft  5. Right knee possible arthroscopic Artelon graft augmentation    The details of the surgical procedure were explained, including the location of probable incisions and a description of likely hardware and/or grafts to be used.  The patient understands the likely convalescence after surgery.  Also, we have thoroughly discussed the risks, benefits and alternatives to surgery, including, but not limited to, the risk of infection, joint stiffness, blood clot (including DVT and/or pulmonary embolus), neurologic and vascular injury.  It was explained that, if tissue has been repaired or reconstructed, there is a chance of failure, which may require further management.      All of the patient's questions were answered and informed consent was obtained. The patient will contact us if they have any questions or concerns in the interim.  '            Patient questionnaires may have been collected.

## 2020-01-18 ENCOUNTER — HOSPITAL ENCOUNTER (EMERGENCY)
Facility: HOSPITAL | Age: 50
Discharge: HOME | End: 2020-01-18
Attending: EMERGENCY MEDICINE
Payer: COMMERCIAL

## 2020-01-18 VITALS
RESPIRATION RATE: 16 BRPM | DIASTOLIC BLOOD PRESSURE: 66 MMHG | HEART RATE: 83 BPM | TEMPERATURE: 98 F | OXYGEN SATURATION: 95 % | WEIGHT: 235 LBS | BODY MASS INDEX: 32.9 KG/M2 | SYSTOLIC BLOOD PRESSURE: 128 MMHG | HEIGHT: 71 IN

## 2020-01-18 DIAGNOSIS — M54.12 CERVICAL RADICULOPATHY: Primary | ICD-10-CM

## 2020-01-18 LAB
ALBUMIN SERPL BCP-MCNC: 4.1 G/DL (ref 3.5–5.2)
ALP SERPL-CCNC: 66 U/L (ref 55–135)
ALT SERPL W/O P-5'-P-CCNC: 21 U/L (ref 10–44)
ANION GAP SERPL CALC-SCNC: 7 MMOL/L (ref 8–16)
ANISOCYTOSIS BLD QL SMEAR: SLIGHT
AST SERPL-CCNC: 18 U/L (ref 10–40)
BASOPHILS # BLD AUTO: 0.09 K/UL (ref 0–0.2)
BASOPHILS NFR BLD: 0.5 % (ref 0–1.9)
BILIRUB SERPL-MCNC: 0.3 MG/DL (ref 0.1–1)
BUN SERPL-MCNC: 19 MG/DL (ref 6–20)
CALCIUM SERPL-MCNC: 9.4 MG/DL (ref 8.7–10.5)
CHLORIDE SERPL-SCNC: 107 MMOL/L (ref 95–110)
CK SERPL-CCNC: 73 U/L (ref 20–200)
CO2 SERPL-SCNC: 22 MMOL/L (ref 23–29)
CREAT SERPL-MCNC: 0.9 MG/DL (ref 0.5–1.4)
DIFFERENTIAL METHOD: ABNORMAL
EOSINOPHIL # BLD AUTO: 0.2 K/UL (ref 0–0.5)
EOSINOPHIL NFR BLD: 1.1 % (ref 0–8)
ERYTHROCYTE [DISTWIDTH] IN BLOOD BY AUTOMATED COUNT: 13.5 % (ref 11.5–14.5)
EST. GFR  (AFRICAN AMERICAN): >60 ML/MIN/1.73 M^2
EST. GFR  (NON AFRICAN AMERICAN): >60 ML/MIN/1.73 M^2
GLUCOSE SERPL-MCNC: 116 MG/DL (ref 70–110)
HCT VFR BLD AUTO: 48.5 % (ref 40–54)
HGB BLD-MCNC: 16.1 G/DL (ref 14–18)
IMM GRANULOCYTES # BLD AUTO: 0.07 K/UL (ref 0–0.04)
IMM GRANULOCYTES NFR BLD AUTO: 0.4 % (ref 0–0.5)
LYMPHOCYTES # BLD AUTO: 2.1 K/UL (ref 1–4.8)
LYMPHOCYTES NFR BLD: 12.4 % (ref 18–48)
MCH RBC QN AUTO: 31.1 PG (ref 27–31)
MCHC RBC AUTO-ENTMCNC: 33.2 G/DL (ref 32–36)
MCV RBC AUTO: 94 FL (ref 82–98)
MONOCYTES # BLD AUTO: 2.2 K/UL (ref 0.3–1)
MONOCYTES NFR BLD: 12.8 % (ref 4–15)
NEUTROPHILS # BLD AUTO: 12.4 K/UL (ref 1.8–7.7)
NEUTROPHILS NFR BLD: 72.8 % (ref 38–73)
NRBC BLD-RTO: 0 /100 WBC
PLATELET # BLD AUTO: 271 K/UL (ref 150–350)
PLATELET BLD QL SMEAR: ABNORMAL
PMV BLD AUTO: 11.5 FL (ref 9.2–12.9)
POTASSIUM SERPL-SCNC: 4.9 MMOL/L (ref 3.5–5.1)
PROT SERPL-MCNC: 7.5 G/DL (ref 6–8.4)
RBC # BLD AUTO: 5.17 M/UL (ref 4.6–6.2)
SODIUM SERPL-SCNC: 136 MMOL/L (ref 136–145)
TOXIC GRANULES BLD QL SMEAR: PRESENT
WBC # BLD AUTO: 16.97 K/UL (ref 3.9–12.7)

## 2020-01-18 PROCEDURE — 80053 COMPREHEN METABOLIC PANEL: CPT

## 2020-01-18 PROCEDURE — 63600175 PHARM REV CODE 636 W HCPCS: Performed by: EMERGENCY MEDICINE

## 2020-01-18 PROCEDURE — 99284 EMERGENCY DEPT VISIT MOD MDM: CPT | Mod: ,,, | Performed by: EMERGENCY MEDICINE

## 2020-01-18 PROCEDURE — 82550 ASSAY OF CK (CPK): CPT

## 2020-01-18 PROCEDURE — 85025 COMPLETE CBC W/AUTO DIFF WBC: CPT

## 2020-01-18 PROCEDURE — 96374 THER/PROPH/DIAG INJ IV PUSH: CPT

## 2020-01-18 PROCEDURE — 99285 EMERGENCY DEPT VISIT HI MDM: CPT | Mod: 25

## 2020-01-18 PROCEDURE — 96361 HYDRATE IV INFUSION ADD-ON: CPT

## 2020-01-18 PROCEDURE — 25000003 PHARM REV CODE 250: Performed by: EMERGENCY MEDICINE

## 2020-01-18 PROCEDURE — 99284 PR EMERGENCY DEPT VISIT,LEVEL IV: ICD-10-PCS | Mod: ,,, | Performed by: EMERGENCY MEDICINE

## 2020-01-18 RX ORDER — TRAMADOL HYDROCHLORIDE 50 MG/1
50 TABLET ORAL
Status: COMPLETED | OUTPATIENT
Start: 2020-01-18 | End: 2020-01-18

## 2020-01-18 RX ORDER — KETOROLAC TROMETHAMINE 30 MG/ML
15 INJECTION, SOLUTION INTRAMUSCULAR; INTRAVENOUS
Status: COMPLETED | OUTPATIENT
Start: 2020-01-18 | End: 2020-01-18

## 2020-01-18 RX ORDER — TRAMADOL HYDROCHLORIDE 50 MG/1
50 TABLET ORAL EVERY 6 HOURS PRN
Qty: 12 TABLET | Refills: 0 | Status: SHIPPED | OUTPATIENT
Start: 2020-01-18 | End: 2020-01-21

## 2020-01-18 RX ORDER — METHYLPREDNISOLONE 4 MG/1
TABLET ORAL
Qty: 1 PACKAGE | Refills: 0 | Status: SHIPPED | OUTPATIENT
Start: 2020-01-18 | End: 2020-02-08

## 2020-01-18 RX ADMIN — KETOROLAC TROMETHAMINE 15 MG: 30 INJECTION, SOLUTION INTRAMUSCULAR; INTRAVENOUS at 07:01

## 2020-01-18 RX ADMIN — SODIUM CHLORIDE 1000 ML: 0.9 INJECTION, SOLUTION INTRAVENOUS at 07:01

## 2020-01-18 RX ADMIN — TRAMADOL HYDROCHLORIDE 50 MG: 50 TABLET, FILM COATED ORAL at 07:01

## 2020-01-18 NOTE — ED PROVIDER NOTES
Encounter Date: 1/18/2020       History     Chief Complaint   Patient presents with    Arm Pain     L shoulder pain and tenderness x 2 days, pain is worse with any movement     HPI     This is a 49-year-old male with a history of hypertension who presents with atraumatic acute onset left-sided shoulder and arm pain that radiates to the mid arm, that has been unresponsive to home ibuprofen therapy.  He denies any paresthesias in his hands, neck trauma or prior neck surgeries, fevers, chills, or weakness in the extremities. He has never had anything like this before.  Review of patient's allergies indicates:   Allergen Reactions    Percocet [oxycodone-acetaminophen] Hives     Past Medical History:   Diagnosis Date    Elevated cholesterol     Hay fever     Hypertension      Past Surgical History:   Procedure Laterality Date    ARTHROSCOPIC REPAIR OF ANTERIOR CRUCIATE LIGAMENT Right 7/10/2018    Procedure: REPAIR, KNEE, ACL, ARTHROSCOPIC;  Surgeon: Ryland Coffey MD;  Location: [facility];  Service: Orthopedics;  Laterality: Right;  regional without catheter adductor; Clonidine/Epi/Keterolac/Ropivacaine inj 30cc    CHONDROPLASTY Right 7/10/2018    Procedure: CHONDROPLASTY;  Surgeon: Ryland Coffey MD;  Location: [facility] OR;  Service: Orthopedics;  Laterality: Right;    INJECTION OF STEROID Right 7/10/2018    Procedure: INJECTION, STEROID; amniox right knee;  Surgeon: Ryland Coffey MD;  Location: [facility] OR;  Service: Orthopedics;  Laterality: Right;    KNEE SURGERY      SYNOVECTOMY Right 7/10/2018    Procedure: SYNOVECTOMY;  Surgeon: Ryland Coffey MD;  Location: [facility] OR;  Service: Orthopedics;  Laterality: Right;    TENDON REPAIR Left     thumb    VASECTOMY       Family History   Problem Relation Age of Onset    Heart disease Father     Cancer Father 65        prostate    Hypertension Mother     Cancer Sister         female     Social History     Tobacco Use    Smoking status: Current Every Day Smoker      Packs/day: 1.00     Years: 30.00     Pack years: 30.00     Types: Cigarettes     Last attempt to quit: 2013     Years since quittin.9    Smokeless tobacco: Never Used    Tobacco comment: 15 cigarettes a day   Substance Use Topics    Alcohol use: Yes     Comment: 3x times weekly    Drug use: No     Review of Systems   Constitutional: Negative for chills, diaphoresis, fatigue and fever.   HENT: Negative for congestion, rhinorrhea and sore throat.    Eyes: Negative for visual disturbance.   Respiratory: Negative for cough, chest tightness and shortness of breath.    Cardiovascular: Negative for chest pain.   Gastrointestinal: Negative for abdominal pain, blood in stool, constipation, diarrhea and vomiting.   Genitourinary: Negative for dysuria, hematuria and urgency.   Musculoskeletal: Positive for myalgias. Negative for back pain.   Skin: Negative for rash.   Neurological: Negative for seizures and syncope.   Hematological: Does not bruise/bleed easily.   Psychiatric/Behavioral: Negative for agitation and hallucinations.       Physical Exam     Initial Vitals [20 0555]   BP Pulse Resp Temp SpO2   136/73 100 18 98 °F (36.7 °C) 98 %      MAP       --         Physical Exam  Gen: AxOx3, NAD, well nourished  Eye: sclera anicteric, EOMI, no conjunctivitis, no periorbital edema  ENT: NCAT, OP clear, neck supple with FROM, no stridor  CVS: RRR, no m/r/g, distal radial pulses are 2+ intact/symmetric  Pulm: CTAB, no wheezes, rales or rhonchi, no increased work of breathing  Abd: soft, nontender, nondistended, no organomegaly, no CVAT  Ext:  There is tenderness over the left proximal biceps, without limited range of motion, there is no bony tenderness, there are no overlying skin changes, rash or edema palpated.   Neuro: GCS15, moving all extremities, gait intact  Psych: normal affect, cooperative  ED Course   Procedures  Labs Reviewed   CBC W/ AUTO DIFFERENTIAL - Abnormal; Notable for the following components:        Result Value    WBC 16.97 (*)     Mean Corpuscular Hemoglobin 31.1 (*)     Gran # (ANC) 12.4 (*)     Immature Grans (Abs) 0.07 (*)     Mono # 2.2 (*)     Lymph% 12.4 (*)     All other components within normal limits   COMPREHENSIVE METABOLIC PANEL - Abnormal; Notable for the following components:    CO2 22 (*)     Glucose 116 (*)     Anion Gap 7 (*)     All other components within normal limits   CK          Imaging Results          US Upper Extremity Veins Left (Final result)  Result time 01/18/20 09:30:36    Final result by Leonel Steele MD (01/18/20 09:30:36)                 Impression:      No thrombus in central veins of the left upper extremity.    Electronically signed by resident: Henry Webber  Date:    01/18/2020  Time:    09:02    Electronically signed by: Leonel Steele MD  Date:    01/18/2020  Time:    09:30             Narrative:    EXAMINATION:  US UPPER EXTREMITY VEINS LEFT    CLINICAL HISTORY:  LUE swelling, pain;    TECHNIQUE:  Duplex and color flow Doppler evaluation and dynamic compression was performed of the left upper extremity veins.    COMPARISON:  None    FINDINGS:  Central veins: The internal jugular, subclavian, and axillary veins are patent and free of thrombus.    Arm veins: The brachial, basilic, and cephalic veins are patent and compressible.    Contralateral subclavian/internal jugular veins: The right subclavian and internal jugular veins are patent and free of thrombus.    Other findings: None.                               X-Ray Shoulder 2 or More Views Left (Final result)  Result time 01/18/20 08:04:28    Final result by Azra Kelly MD (01/18/20 08:04:28)                 Impression:      As above.      Electronically signed by: Azra Kelly MD  Date:    01/18/2020  Time:    08:04             Narrative:    EXAMINATION:  XR SHOULDER COMPLETE 2 OR MORE VIEWS LEFT    CLINICAL HISTORY:  left shoulder pain;    TECHNIQUE:  Two or three views of the left shoulder were  performed.    COMPARISON:  None    FINDINGS:  There are mild degenerative changes of the acromioclavicular joint.  The glenohumeral joint space is intact.  No fracture or dislocation is seen.  Small calcification within the soft tissues noted anteriorly measuring 1.8 cm.  Component of calcific tendinitis involving the subscapularis not excluded.  Visualized left lung is clear.                                 Medical Decision Making:   Initial Assessment:   This is a 49-year-old male with a history of hypertension who presents with left-sided arm pain. He is overall very well-appearing, has symmetric radial pulses, I have very low suspicion for aortic dissection.  He has focal muscle tenderness, so differential includes muscular strain, myositis, or I think less likely DVT.  Plan for x-ray of the arm, DVT study, as well as labs including a CK to further evaluate.  The patient has multiple opiate allergies, but can take tramadol, so we will give him a dose of this as well as Toradol.  ED Management:  Patient felt symptomatically improved after medication.  His x-ray by my independent interpretation does not show fracture.  His ultrasound by my independent interpretation does not show any signs of DVT, or any other anatomic explanation for the patient's pain.  His CK is within normal limits. His labs do show an elevated white blood cell count, but this is nonspecific.  I have low suspicion for infectious etiology based on his history and physical.  Plan for discharge with rest, elevation, ice and compression, and follow up with his primary care doctor.  He voiced understanding of the plan was discharged in good condition.                                 Clinical Impression:       ICD-10-CM ICD-9-CM   1. Cervical radiculopathy M54.12 723.4                             Brittaney Tucker MD  01/18/20 0098

## 2021-03-24 ENCOUNTER — IMMUNIZATION (OUTPATIENT)
Dept: PRIMARY CARE CLINIC | Facility: CLINIC | Age: 51
End: 2021-03-24
Payer: COMMERCIAL

## 2021-03-24 DIAGNOSIS — Z23 NEED FOR VACCINATION: Primary | ICD-10-CM

## 2021-03-24 PROCEDURE — 0001A COVID-19, MRNA, LNP-S, PF, 30 MCG/0.3 ML DOSE VACCINE: CPT | Mod: CV19,S$GLB,, | Performed by: INTERNAL MEDICINE

## 2021-03-24 PROCEDURE — 0001A COVID-19, MRNA, LNP-S, PF, 30 MCG/0.3 ML DOSE VACCINE: ICD-10-PCS | Mod: CV19,S$GLB,, | Performed by: INTERNAL MEDICINE

## 2021-03-24 PROCEDURE — 91300 COVID-19, MRNA, LNP-S, PF, 30 MCG/0.3 ML DOSE VACCINE: ICD-10-PCS | Mod: S$GLB,,, | Performed by: INTERNAL MEDICINE

## 2021-03-24 PROCEDURE — 91300 COVID-19, MRNA, LNP-S, PF, 30 MCG/0.3 ML DOSE VACCINE: CPT | Mod: S$GLB,,, | Performed by: INTERNAL MEDICINE

## 2021-04-14 ENCOUNTER — IMMUNIZATION (OUTPATIENT)
Dept: PRIMARY CARE CLINIC | Facility: CLINIC | Age: 51
End: 2021-04-14
Payer: COMMERCIAL

## 2021-04-14 DIAGNOSIS — Z23 NEED FOR VACCINATION: Primary | ICD-10-CM

## 2021-04-14 PROCEDURE — 0002A COVID-19, MRNA, LNP-S, PF, 30 MCG/0.3 ML DOSE VACCINE: CPT | Mod: CV19,S$GLB,, | Performed by: INTERNAL MEDICINE

## 2021-04-14 PROCEDURE — 0002A COVID-19, MRNA, LNP-S, PF, 30 MCG/0.3 ML DOSE VACCINE: ICD-10-PCS | Mod: CV19,S$GLB,, | Performed by: INTERNAL MEDICINE

## 2021-04-14 PROCEDURE — 91300 COVID-19, MRNA, LNP-S, PF, 30 MCG/0.3 ML DOSE VACCINE: ICD-10-PCS | Mod: S$GLB,,, | Performed by: INTERNAL MEDICINE

## 2021-04-14 PROCEDURE — 91300 COVID-19, MRNA, LNP-S, PF, 30 MCG/0.3 ML DOSE VACCINE: CPT | Mod: S$GLB,,, | Performed by: INTERNAL MEDICINE

## 2021-06-15 ENCOUNTER — CLINICAL SUPPORT (OUTPATIENT)
Dept: OTHER | Facility: CLINIC | Age: 51
End: 2021-06-15

## 2021-06-15 DIAGNOSIS — Z00.8 ENCOUNTER FOR OTHER GENERAL EXAMINATION: ICD-10-CM

## 2021-07-06 VITALS — HEIGHT: 71 IN | BODY MASS INDEX: 32.78 KG/M2

## 2021-07-06 LAB
HDLC SERPL-MCNC: 35 MG/DL
POC CHOLESTEROL, LDL (DOCK): 107 MG/DL
POC CHOLESTEROL, TOTAL: 157 MG/DL
POC GLUCOSE, FASTING: 112 MG/DL (ref 60–110)
TRIGL SERPL-MCNC: 76 MG/DL

## 2022-02-11 ENCOUNTER — CLINICAL SUPPORT (OUTPATIENT)
Dept: OTHER | Facility: CLINIC | Age: 52
End: 2022-02-11
Payer: COMMERCIAL

## 2022-02-11 DIAGNOSIS — Z00.8 ENCOUNTER FOR OTHER GENERAL EXAMINATION: ICD-10-CM

## 2022-02-12 VITALS — BODY MASS INDEX: 32.78 KG/M2 | HEIGHT: 71 IN

## 2022-02-12 LAB
HDLC SERPL-MCNC: 30 MG/DL
POC CHOLESTEROL, LDL (DOCK): 150 MG/DL
POC CHOLESTEROL, TOTAL: 203 MG/DL
POC GLUCOSE, FASTING: 138 MG/DL (ref 60–110)
TRIGL SERPL-MCNC: 113 MG/DL

## 2022-04-04 ENCOUNTER — TELEPHONE (OUTPATIENT)
Dept: ENDOSCOPY | Facility: HOSPITAL | Age: 52
End: 2022-04-04
Payer: COMMERCIAL

## 2022-04-04 DIAGNOSIS — Z12.11 SPECIAL SCREENING FOR MALIGNANT NEOPLASMS, COLON: Primary | ICD-10-CM

## 2022-04-04 RX ORDER — POLYETHYLENE GLYCOL 3350, SODIUM SULFATE ANHYDROUS, SODIUM BICARBONATE, SODIUM CHLORIDE, POTASSIUM CHLORIDE 236; 22.74; 6.74; 5.86; 2.97 G/4L; G/4L; G/4L; G/4L; G/4L
4 POWDER, FOR SOLUTION ORAL ONCE
Qty: 4000 ML | Refills: 0 | Status: SHIPPED | OUTPATIENT
Start: 2022-04-04 | End: 2022-04-04

## 2022-05-16 ENCOUNTER — HOSPITAL ENCOUNTER (OUTPATIENT)
Facility: HOSPITAL | Age: 52
Discharge: HOME | End: 2022-05-16
Attending: COLON & RECTAL SURGERY | Admitting: COLON & RECTAL SURGERY
Payer: COMMERCIAL

## 2022-05-16 ENCOUNTER — ANESTHESIA (OUTPATIENT)
Dept: ENDOSCOPY | Facility: HOSPITAL | Age: 52
End: 2022-05-16
Payer: COMMERCIAL

## 2022-05-16 ENCOUNTER — ANESTHESIA EVENT (OUTPATIENT)
Dept: ENDOSCOPY | Facility: HOSPITAL | Age: 52
End: 2022-05-16
Payer: COMMERCIAL

## 2022-05-16 VITALS
RESPIRATION RATE: 16 BRPM | DIASTOLIC BLOOD PRESSURE: 70 MMHG | HEIGHT: 71 IN | TEMPERATURE: 98 F | SYSTOLIC BLOOD PRESSURE: 113 MMHG | OXYGEN SATURATION: 95 % | WEIGHT: 240 LBS | BODY MASS INDEX: 33.6 KG/M2 | HEART RATE: 66 BPM

## 2022-05-16 DIAGNOSIS — Z12.11 SCREENING FOR MALIGNANT NEOPLASM OF COLON: Primary | ICD-10-CM

## 2022-05-16 PROCEDURE — 25000003 PHARM REV CODE 250: Performed by: NURSE ANESTHETIST, CERTIFIED REGISTERED

## 2022-05-16 PROCEDURE — 63600175 PHARM REV CODE 636 W HCPCS: Performed by: NURSE ANESTHETIST, CERTIFIED REGISTERED

## 2022-05-16 PROCEDURE — 88305 TISSUE EXAM BY PATHOLOGIST: CPT | Mod: 59 | Performed by: PATHOLOGY

## 2022-05-16 PROCEDURE — 37000008 HC ANESTHESIA 1ST 15 MINUTES: Performed by: COLON & RECTAL SURGERY

## 2022-05-16 PROCEDURE — 27201012 HC FORCEPS, HOT/COLD, DISP: Performed by: COLON & RECTAL SURGERY

## 2022-05-16 PROCEDURE — E9220 PRA ENDO ANESTHESIA: ICD-10-PCS | Mod: 33,,, | Performed by: NURSE ANESTHETIST, CERTIFIED REGISTERED

## 2022-05-16 PROCEDURE — 45380 PR COLONOSCOPY,BIOPSY: ICD-10-PCS | Mod: 33,,, | Performed by: COLON & RECTAL SURGERY

## 2022-05-16 PROCEDURE — 88305 TISSUE EXAM BY PATHOLOGIST: CPT | Mod: 26,,, | Performed by: PATHOLOGY

## 2022-05-16 PROCEDURE — 88305 TISSUE EXAM BY PATHOLOGIST: ICD-10-PCS | Mod: 26,,, | Performed by: PATHOLOGY

## 2022-05-16 PROCEDURE — 45380 COLONOSCOPY AND BIOPSY: CPT | Mod: PT | Performed by: COLON & RECTAL SURGERY

## 2022-05-16 PROCEDURE — 25000003 PHARM REV CODE 250: Performed by: COLON & RECTAL SURGERY

## 2022-05-16 PROCEDURE — 37000009 HC ANESTHESIA EA ADD 15 MINS: Performed by: COLON & RECTAL SURGERY

## 2022-05-16 PROCEDURE — E9220 PRA ENDO ANESTHESIA: HCPCS | Mod: 33,,, | Performed by: NURSE ANESTHETIST, CERTIFIED REGISTERED

## 2022-05-16 PROCEDURE — 45380 COLONOSCOPY AND BIOPSY: CPT | Mod: 33,,, | Performed by: COLON & RECTAL SURGERY

## 2022-05-16 RX ORDER — LIDOCAINE HYDROCHLORIDE 20 MG/ML
INJECTION, SOLUTION EPIDURAL; INFILTRATION; INTRACAUDAL; PERINEURAL
Status: DISCONTINUED | OUTPATIENT
Start: 2022-05-16 | End: 2022-05-16

## 2022-05-16 RX ORDER — SODIUM CHLORIDE 9 MG/ML
INJECTION, SOLUTION INTRAVENOUS CONTINUOUS
Status: DISCONTINUED | OUTPATIENT
Start: 2022-05-16 | End: 2022-05-16 | Stop reason: HOSPADM

## 2022-05-16 RX ORDER — SODIUM CHLORIDE 9 MG/ML
INJECTION, SOLUTION INTRAVENOUS CONTINUOUS PRN
Status: DISCONTINUED | OUTPATIENT
Start: 2022-05-16 | End: 2022-05-16

## 2022-05-16 RX ORDER — PROPOFOL 10 MG/ML
VIAL (ML) INTRAVENOUS CONTINUOUS PRN
Status: DISCONTINUED | OUTPATIENT
Start: 2022-05-16 | End: 2022-05-16

## 2022-05-16 RX ORDER — PROPOFOL 10 MG/ML
VIAL (ML) INTRAVENOUS
Status: DISCONTINUED | OUTPATIENT
Start: 2022-05-16 | End: 2022-05-16

## 2022-05-16 RX ADMIN — GLYCOPYRROLATE 0.2 MG: 0.2 INJECTION, SOLUTION INTRAMUSCULAR; INTRAVITREAL at 02:05

## 2022-05-16 RX ADMIN — SODIUM CHLORIDE: 9 INJECTION, SOLUTION INTRAVENOUS at 02:05

## 2022-05-16 RX ADMIN — LIDOCAINE HYDROCHLORIDE 50 MG: 20 INJECTION, SOLUTION EPIDURAL; INFILTRATION; INTRACAUDAL; PERINEURAL at 02:05

## 2022-05-16 RX ADMIN — PROPOFOL 100 MG: 10 INJECTION, EMULSION INTRAVENOUS at 02:05

## 2022-05-16 RX ADMIN — SODIUM CHLORIDE: 0.9 INJECTION, SOLUTION INTRAVENOUS at 01:05

## 2022-05-16 RX ADMIN — PROPOFOL 160 MCG/KG/MIN: 10 INJECTION, EMULSION INTRAVENOUS at 02:05

## 2022-05-16 NOTE — H&P
"COLONOSCOPY HISTORY AND PHYSICAL EXAM    Procedure : Colonoscopy      INDICATIONS: asymptomatic screening exam    Family Hx of CRC: None    Last Colonoscopy:  None  Findings: n/a       Past Medical History:   Diagnosis Date    Elevated cholesterol     Hay fever     Hypertension     Sleep apnea      Sedation Problems: NO  Family History   Problem Relation Age of Onset    Hypertension Mother     Heart disease Father     Cancer Father 65        prostate    Esophageal cancer Father     Cancer Sister         female     Fam Hx of Sedation Problems: NO  Social History     Socioeconomic History    Marital status: Single   Occupational History     Employer: [employer]     Employer: [employer]   Tobacco Use    Smoking status: Current Every Day Smoker     Packs/day: 0.50     Years: 30.00     Pack years: 15.00     Types: Cigarettes     Last attempt to quit: 2013     Years since quittin.2    Smokeless tobacco: Never Used    Tobacco comment: 15 cigarettes a day   Substance and Sexual Activity    Alcohol use: Yes     Comment: 3x times weekly    Drug use: No       Review of Systems - Negative except   Respiratory ROS: no dyspnea  Cardiovascular ROS: no exertional chest pain  Gastrointestinal ROS: NO abdominal discomfort,  NO rectal bleeding  Musculoskeletal ROS: no muscular pain  Neurological ROS: no recent stroke    Physical Exam:  BP (!) 141/99 (BP Location: Left arm, Patient Position: Lying)   Pulse 92   Temp 97.9 °F (36.6 °C) (Temporal)   Resp 16   Ht 5' 11" (1.803 m)   Wt 108.9 kg (240 lb)   SpO2 96%   BMI 33.47 kg/m²   General: no distress  Head: normocephalic  Mallampati Score   Neck: supple, symmetrical, trachea midline  Lungs:  clear to auscultation bilaterally and normal respiratory effort  Heart: regular rate and rhythm and no murmur  Abdomen: soft, non-tender non-distented; bowel sounds normal; no masses,  no organomegaly  Extremities: no cyanosis or edema, or clubbing    ASA:  " II    PLAN  COLONOSCOPY.    SedationPlan :MAC    The details of the procedure, the possible need for biopsy or polypectomy and the potential risks including bleeding, perforation, missed polyps were discussed in detail.

## 2022-05-16 NOTE — PLAN OF CARE
Written and verbal discharge instructions  given to patient. Patient verbalizes understanding and has no questions at this time.

## 2022-05-16 NOTE — ANESTHESIA POSTPROCEDURE EVALUATION
Anesthesia Post Evaluation    Patient: Fredy Taylor    Procedure(s) Performed: Procedure(s) (LRB):  COLONOSCOPY (N/A)    Final Anesthesia Type: general      Patient location during evaluation: GI PACU  Patient participation: Yes- Able to Participate  Level of consciousness: awake and alert and oriented  Post-procedure vital signs: reviewed and stable  Pain management: adequate  Airway patency: patent    PONV status at discharge: No PONV  Anesthetic complications: no      Cardiovascular status: blood pressure returned to baseline  Respiratory status: unassisted, spontaneous ventilation and room air  Hydration status: euvolemic  Follow-up not needed.          Vitals Value Taken Time   /70 05/16/22 1533   Temp 36.5 °C (97.7 °F) 05/16/22 1500   Pulse 66 05/16/22 1533   Resp 16 05/16/22 1533   SpO2 95 % 05/16/22 1533         Event Time   Out of Recovery 15:38:43         Pain/Sharri Score: Sharri Score: 9 (5/16/2022  3:33 PM)

## 2022-05-16 NOTE — PROVATION PATIENT INSTRUCTIONS
Discharge Summary/Instructions after an Endoscopic Procedure  Patient Name: Fredy Taylor  Patient MRN: [id number]  Patient YOB: 1970  Monday, May 16, 2022  Inez Loredo MD  Dear patient,  As a result of recent federal legislation (The Federal Cures Act), you may   receive lab or pathology results from your procedure in your [facility]   account before your physician is able to contact you. Your physician or   their representative will relay the results to you with their   recommendations at their soonest availability.  Thank you,  RESTRICTIONS:  During your procedure today, you received medications for sedation.  These   medications may affect your judgment, balance and coordination.  Therefore,   for 24 hours, you have the following restrictions:   - DO NOT drive a car, operate machinery, make legal/financial decisions,   sign important papers or drink alcohol.    ACTIVITY:  Today: no heavy lifting, straining or running due to procedural   sedation/anesthesia.  The following day: return to full activity including work.  DIET:  Eat and drink normally unless instructed otherwise.     TREATMENT FOR COMMON SIDE EFFECTS:  - Mild abdominal pain, nausea, belching, bloating or excessive gas:  rest,   eat lightly and use a heating pad.  - Sore Throat: treat with throat lozenges and/or gargle with warm salt   water.  - Because air was used during the procedure, expelling large amounts of air   from your rectum or belching is normal.  - If a bowel prep was taken, you may not have a bowel movement for 1-3 days.    This is normal.  SYMPTOMS TO WATCH FOR AND REPORT TO YOUR PHYSICIAN:  1. Abdominal pain or bloating, other than gas cramps.  2. Chest pain.  3. Back pain.  4. Signs of infection such as: chills or fever occurring within 24 hours   after the procedure.  5. Rectal bleeding, which would show as bright red, maroon, or black stools.   (A tablespoon of blood from the rectum is not serious, especially  if   hemorrhoids are present.)  6. Vomiting.  7. Weakness or dizziness.  GO DIRECTLY TO THE NEAREST EMERGENCY ROOM IF YOU HAVE ANY OF THE FOLLOWING:      Difficulty breathing              Chills and/or fever over 101 F   Persistent vomiting and/or vomiting blood   Severe abdominal pain   Severe chest pain   Black, tarry stools   Bleeding- more than one tablespoon   Any other symptom or condition that you feel may need urgent attention  Your doctor recommends these additional instructions:  If any biopsies were taken, your doctors clinic will contact you in 1 to 2   weeks with any results.  - Discharge patient to home.   - Resume previous diet.   - Continue present medications.   - Await pathology results.   - Repeat colonoscopy date to be determined after pending pathology results   are reviewed for surveillance.   - Return to primary care physician.   - Written discharge instructions were provided to the patient.   - The signs and symptoms of potential delayed complications were discussed   with the patient.   - Patient has a contact number available for emergencies.   - Return to normal activities tomorrow.  For questions, problems or results please call your physician - Inez Loredo MD at Work:  [phone].  [facility], EMERGENCY ROOM PHONE NUMBER: [phone]  IF A COMPLICATION OR EMERGENCY SITUATION ARISES AND YOU ARE UNABLE TO REACH   YOUR PHYSICIAN - GO DIRECTLY TO THE EMERGENCY ROOM.  Inez Loredo MD  5/16/2022 2:55:37 PM  This report has been verified and signed electronically.  Dear patient,  As a result of recent federal legislation (The Federal Cures Act), you may   receive lab or pathology results from your procedure in your [facility]   account before your physician is able to contact you. Your physician or   their representative will relay the results to you with their   recommendations at their soonest availability.  Thank you,  PROVATION

## 2022-05-16 NOTE — TRANSFER OF CARE
"Anesthesia Transfer of Care Note    Patient: Fredy Taylor    Procedure(s) Performed: Procedure(s) (LRB):  COLONOSCOPY (N/A)    Patient location: GI    Anesthesia Type: general    Transport from OR: Transported from OR on 6-10 L/min O2 by face mask with adequate spontaneous ventilation    Post pain: adequate analgesia    Post assessment: no apparent anesthetic complications and tolerated procedure well    Post vital signs: stable    Level of consciousness: responds to stimulation    Nausea/Vomiting: no nausea/vomiting    Complications: none    Transfer of care protocol was followed      Last vitals: 5/16/22 1501  Visit Vitals  BP (!) 101/56 (BP Location: Left arm, Patient Position: Lying)   Pulse 97   Temp 36.5 °C (97.7 °F) (Temporal)   Resp 16   Ht 5' 11" (1.803 m)   Wt 108.9 kg (240 lb)   SpO2 97%   BMI 33.47 kg/m²     "

## 2022-05-16 NOTE — ANESTHESIA PREPROCEDURE EVALUATION
[facility]  Anesthesia Pre-Operative Evaluation         Patient Name: Fredy Taylor  YOB: 1970  MRN: [id number]    SUBJECTIVE:     05/16/2022    Procedure(s) (LRB):  COLONOSCOPY (N/A)    Fredy Taylor is a 51 y.o. male here for above procedure    Drips:    sodium chloride 0.9% 10 mL/hr at 05/16/22 1317       Patient Active Problem List   Diagnosis    Depressive disorder, not elsewhere classified    Rhinitis, allergic    Rupture of ulnar collateral ligament of thumb    Left hand pain    Chronic pain of right knee    Acute internal derangement of right knee    Rupture of anterior cruciate ligament of right knee    ACL tear       Review of patient's allergies indicates:   Allergen Reactions    Percocet [oxycodone-acetaminophen] Hives       No current facility-administered medications on file prior to encounter.     Current Outpatient Medications on File Prior to Encounter   Medication Sig Dispense Refill    buPROPion (WELLBUTRIN) 100 MG tablet Take 20 mg by mouth once daily.      fluticasone (FLONASE) 50 mcg/actuation nasal spray 1 spray by Each Nare route once daily.      ibuprofen (ADVIL,MOTRIN) 600 MG tablet Take 600 mg by mouth 3 (three) times daily.      losartan (COZAAR) 25 MG tablet Take 1 tablet (25 mg total) by mouth once daily. (Patient taking differently: Take 25 mg by mouth every evening.) 30 tablet 6    aspirin 325 MG tablet Take 1 tablet (325 mg total) by mouth once daily. 42 tablet 0    clotrimazole-betamethasone 1-0.05% (LOTRISONE) cream Apply topically 2 (two) times daily. 45 g 0    HYDROcodone-acetaminophen (NORCO)  mg per tablet Take 1 tablet by mouth every 6 (six) hours as needed for Pain. 50 tablet 0    promethazine (PHENERGAN) 25 MG tablet Take 1 tablet (25 mg total) by mouth every 6 (six) hours as needed for Nausea. 20 tablet 0    sulfamethoxazole-trimethoprim 800-160mg (BACTRIM DS) 800-160 mg Tab Take 1 tablet by mouth 2 (two) times  daily. 20 tablet 0       Past Surgical History:   Procedure Laterality Date    ARTHROSCOPIC REPAIR OF ANTERIOR CRUCIATE LIGAMENT Right 7/10/2018    Procedure: REPAIR, KNEE, ACL, ARTHROSCOPIC;  Surgeon: Ryland Coffey MD;  Location: [facility];  Service: Orthopedics;  Laterality: Right;  regional without catheter adductor; Clonidine/Epi/Keterolac/Ropivacaine inj 30cc    CHONDROPLASTY Right 7/10/2018    Procedure: CHONDROPLASTY;  Surgeon: Ryland Coffey MD;  Location: [facility];  Service: Orthopedics;  Laterality: Right;    INJECTION OF STEROID Right 7/10/2018    Procedure: INJECTION, STEROID; amniox right knee;  Surgeon: Ryland Coffey MD;  [facility];  Service: Orthopedics;  Laterality: Right;    KNEE SURGERY      SYNOVECTOMY Right 7/10/2018    Procedure: SYNOVECTOMY;  Surgeon: Ryland Coffey MD;  Location: [facility];  Service: Orthopedics;  Laterality: Right;    TENDON REPAIR Left     thumb    VASECTOMY         Social History     Socioeconomic History    Marital status: Single   Occupational History     Employer: [employer]     Employer: [employer]   Tobacco Use    Smoking status: Current Every Day Smoker     Packs/day: 0.50     Years: 30.00     Pack years: 15.00     Types: Cigarettes     Last attempt to quit: 2013     Years since quittin.2    Smokeless tobacco: Never Used    Tobacco comment: 15 cigarettes a day   Substance and Sexual Activity    Alcohol use: Yes     Comment: 3x times weekly    Drug use: No         OBJECTIVE:     Vital Signs Range (Last 24H):  Temp:  [36.6 °C (97.9 °F)] 36.6 °C (97.9 °F)  Pulse:  [92] 92  Resp:  [16] 16  SpO2:  [96 %] 96 %  BP: (141)/(99) 141/99    Significant Labs:  Lab Results   Component Value Date    WBC 16.97 (H) 2020    HGB 16.1 2020    HCT 48.5 2020     2020    CHOL 183 2015    TRIG 59 2015    HDL 40 2015    ALT 21 2020    AST 18 2020     2020    K 4.9 2020      01/18/2020    CREATININE 0.9 01/18/2020    BUN 19 01/18/2020    CO2 22 (L) 01/18/2020    TSH 1.135 08/01/2015    PSA 1.1 08/01/2015    HGBA1C 5.9 08/01/2015       Diagnostic Studies:    EKG:   No results found for this or any previous visit.    2D ECHO:  TTE:  No results found for this or any previous visit.      FANNY:  No results found for this or any previous visit.        Pre-op Assessment    I have reviewed the Patient Summary Reports.     I have reviewed the Nursing Notes. I have reviewed the NPO Status.   I have reviewed the Medications.     Review of Systems  Anesthesia Hx:  No problems with previous Anesthesia  History of prior surgery of interest to airway management or planning: Previous anesthesia: General   Cardiovascular:  Functional Capacity good / => 4 METS    Endocrine:  Metabolic Disorders, Obesity / BMI > 30      Physical Exam  General: Well nourished, Cooperative, Alert and Oriented    Airway:  Mallampati: I   Mouth Opening: Normal  TM Distance: Normal  Tongue: Normal  Neck ROM: Normal ROM    Dental:  Intact    Chest/Lungs:  Clear to auscultation    Heart:  Rate: Normal  Rhythm: Regular Rhythm  Sounds: Normal    Abdomen:  Normal, Soft, Nontender        Anesthesia Plan  Type of Anesthesia, risks & benefits discussed:    Anesthesia Type: Gen Natural Airway, MAC  Intra-op Monitoring Plan: Standard ASA Monitors  Post Op Pain Control Plan: multimodal analgesia  Induction:  IV  Informed Consent: Informed consent signed with the Patient and all parties understand the risks and agree with anesthesia plan.  All questions answered.   ASA Score: 2  Day of Surgery Review of History & Physical: H&P Update referred to the surgeon/provider.    Ready For Surgery From Anesthesia Perspective.     .

## 2022-05-23 LAB
FINAL PATHOLOGIC DIAGNOSIS: NORMAL
Lab: NORMAL

## 2023-01-18 ENCOUNTER — CLINICAL SUPPORT (OUTPATIENT)
Dept: OTHER | Facility: CLINIC | Age: 53
End: 2023-01-18
Payer: COMMERCIAL

## 2023-01-18 DIAGNOSIS — Z00.8 ENCOUNTER FOR OTHER GENERAL EXAMINATION: ICD-10-CM

## 2023-01-20 VITALS
HEIGHT: 70 IN | BODY MASS INDEX: 32.93 KG/M2 | WEIGHT: 230 LBS | SYSTOLIC BLOOD PRESSURE: 124 MMHG | DIASTOLIC BLOOD PRESSURE: 80 MMHG

## 2023-01-20 LAB
HDLC SERPL-MCNC: 43 MG/DL
POC CHOLESTEROL, LDL (DOCK): 112 MG/DL
POC CHOLESTEROL, TOTAL: 173 MG/DL
POC GLUCOSE, FASTING: 124 MG/DL (ref 60–110)
TRIGL SERPL-MCNC: 100 MG/DL

## 2023-10-17 NOTE — PROGRESS NOTES
Subjective:          Chief Complaint: Fredy Taylor is a 53 y.o. male who had concerns including Injury of the Right Knee.    Fredy comes to clinic as an established patient with our office, he was last seen 3+ years ago after undergoing the below ACL repair surgery in 2018. He reports that about a week ago he was walking with his wife when she slipped and he caught her but at that moment his knee was planted and felt immediate pain and swelling, he is unsure of the exact mechanism that occurred. He was able to continue to walk afterward but with some difficulty. In the subsequent days has been using his old brace which has helped a lot. He has been taking Ibuprofen to help and using ICE to aid in the swelling. Pain today is 7/10. He describes the pain as burning on the medial side of the right knee. Prior to this incident a week ago he had no complaints in his right knee.     DATE OF PROCEDURE:  07/10/2018     ATTENDING SURGEON:  Ryland Coffey M.D.     FIRST ASSISTANT:  Crow Lozada M.D. (RES)     SECOND ASSISTANT:  SMA Zach-assistant.     PREOPERATIVE DIAGNOSIS:  Right knee anterior cruciate ligament tear.     POSTOPERATIVE DIAGNOSES:  1.  Right knee anterior cruciate ligament tear (proximal avulsion).  2.  Right knee synovitis.  3.  Right knee chondromalacia.     OPERATIVE PROCEDURES PERFORMED:  1.  Right knee anterior cruciate ligament repair/augmentation, CPT code 79981.  2.  Right knee arthroscopic synovectomy, CPT code 17434.  3.  Right knee arthroscopic chondroplasty, CPT code 06699.  4.  Right knee has arthrocentesis.  CPT code 79411.           Review of Systems   Constitutional: Negative for chills and fever.   HENT:  Negative for congestion and sore throat.    Eyes:  Negative for discharge and double vision.   Cardiovascular:  Negative for chest pain, palpitations and syncope.   Respiratory:  Negative for cough and shortness of breath.    Endocrine: Negative for cold intolerance and heat  intolerance.   Skin:  Negative for dry skin and rash.   Musculoskeletal:  Positive for joint pain.   Gastrointestinal:  Negative for abdominal pain, nausea and vomiting.   Neurological:  Negative for focal weakness, numbness and paresthesias.                   Objective:        General: Fredy is well-developed, well-nourished, appears stated age, in no acute distress, alert and oriented to time, place and person.     General    Vitals reviewed.  Constitutional: He is oriented to person, place, and time. He appears well-developed and well-nourished. No distress.   HENT:   Mouth/Throat: No oropharyngeal exudate.   Eyes: Right eye exhibits no discharge. Left eye exhibits no discharge.   Pulmonary/Chest: Effort normal and breath sounds normal. No respiratory distress.   Neurological: He is alert and oriented to person, place, and time. He has normal reflexes. No cranial nerve deficit. Coordination normal.   Psychiatric: He has a normal mood and affect. His behavior is normal. Judgment and thought content normal.     General Musculoskeletal Exam   Gait: normal       Right Knee Exam     Inspection   Erythema: absent  Scars: absent  Swelling: absent  Effusion: present  Deformity: absent  Bruising: absent    Tenderness   The patient is tender to palpation of the medial retinaculum.    Range of Motion   Extension:  0   Flexion:  130     Tests   Meniscus   Mikel:  Medial - negative Lateral - negative  Ligament Examination   Lachman: abnormal - grade II  PCL-Posterior Drawer: normal (0 to 2mm)     MCL - Valgus: normal (0 to 2mm)  LCL - Varus: normal  Pivot Shift: abnormal- grade II  Reverse Pivot Shift: normal (Equal)  Dial Test at 30 degrees: normal (< 5 degrees)  Dial Test at 90 degrees: normal (< 5 degrees)  Posterior Sag Test: negative  Posterolateral Corner: stable  Patella   Patellar apprehension: negative  Passive Patellar Tilt: neutral  Patellar Tracking: normal  Patellar Glide (quadrants): Lateral - 1   Medial -  2  Q-Angle at 90 degrees: normal  Patellar Grind: negative  J-Sign: none    Other   Meniscal Cyst: absent  Popliteal (Baker's) Cyst: absent  Sensation: normal    Left Knee Exam     Inspection   Erythema: absent  Scars: absent  Swelling: absent  Effusion: absent  Deformity: absent  Bruising: absent    Tenderness   The patient is experiencing no tenderness.     Range of Motion   Extension:  0   Flexion:  140     Tests   Meniscus   Mikel:  Medial - negative Lateral - negative  Stability   Lachman: normal (-1 to 2mm)   PCL-Posterior Drawer: normal (0 to 2mm)  MCL - Valgus: normal (0 to 2mm)  LCL - Varus: normal (0 to 2mm)  Pivot Shift: normal (Equal)  Reverse Pivot Shift: normal (Equal)  Dial Test at 30 degrees: normal (< 5 degrees)  Dial Test at 90 degrees: normal (< 5 degrees)  Posterior Sag Test: negative  Posterolateral Corner: stable  Patella   Patellar apprehension: negative  Passive Patellar Tilt: neutral  Patellar Tracking: normal  Patellar Glide (Quadrants): Lateral - 1 Medial - 2  Q-Angle at 90 degrees: normal  Patellar Grind: negative  J-Sign: J sign absent    Other   Meniscal Cyst: absent  Popliteal (Baker's) Cyst: absent  Sensation: normal    Right Hip Exam     Tests   Justine: negative  Left Hip Exam     Tests   Justine: negative          Reflexes     Left Side  Achilles:  2+  Quadriceps:  2+    Right Side   Achilles:  2+  Quadriceps:  2+    Vascular Exam     Right Pulses  Dorsalis Pedis:      2+  Posterior Tibial:      2+        Left Pulses  Dorsalis Pedis:      2+  Posterior Tibial:      2+        XRays 4 views bilateral knees.   AP/Lateral/Merchant    Demonstrates no acute fracture or dislocation. Joint spaces appear well maintained. Patella appears to track well and be in good position.         Assessment:       Encounter Diagnosis   Name Primary?    Rupture of anterior cruciate ligament of right knee, sequela Yes          Plan:       1. RTC virtually with Dr. Ryland Coffey. IKDC, SF-12 and KOOS was filled  out today in clinic. Patient will fill out IKDC, SF-12 and KOOS on return.    2. Medications: Refills of the following Rx were sent to patients preferred Pharmacy:  No Refills Needed Today    3. Physical Therapy: Continue/Begin: Begin at [place] with Keagan Banks given today    4. HEP: N/A    5. Procedures/Procedural Planning:   We reviewed with Fredy today, the pathology and natural history of his diagnosis. We have discussed a variety of treatment options including medications, physical therapy and other alternative treatments. I also explained the indications, risks and benefits of surgery. After discussion, Fredy decided to proceed with surgery. The decision was made to go forward with:     Right Knee ACL Reconstruction   (Plan for Autologous hamstring quadrupled with Biobrace augmentation)  Due to large stump remnant will use hamstring graft.  ALL added for augmentation and due to revision nature of the procedure     Right Knee:     1. 24444 Arthroscopy, Anterior Cruciate Ligament Reconstruction Autologous Hamstring / Biobrace Augmentation  2.         97888 Extra-articular ligament reconstruction (ALL - Arthrex Internal     brace/Swivelock)  2. 12894 Arthroscopy, with meniscus repair (medial OR lateral)  3. 44014 Arthroscopy, debridement/shaving of articular cartilage (Chondroplasty),   4. 62485 Arthroscopy, with lysis of adhesions, and      Clearance Medically Necessary: x Yes    -PCP     Pre-Op Center Clearance Medically Necessary: x Yes     The details of the surgical procedure were explained, including the location of probable incisions and a description of likely hardware and/or grafts to be used.  The patient understands the likely convalescence after surgery.  Also, we have thoroughly discussed the risks, benefits and alternatives to surgery, including, but not limited to, the risk of infection, joint stiffness, blood clot (including DVT and/or pulmonary embolus), neurologic and vascular injury.   It was explained that, if tissue has been repaired or reconstructed, there is a chance of failure, which may require further management.      All of the patient's questions were answered and informed consent was obtained. The patient will contact us if they have any questions or concerns in the interim.    Given extreme dysfunction and discomfort, and non-diagnostic x-ray imaging, we will obtain MRI imaging for further evaluation of the Right Knee ACL Tear    6. DME: N/A    7. Work/Sport Status: N/A    8. Visit Summary: Plan for MRI Right Knee to evaluate for ACL tear. Will follow up virtually with Dr. Coffey. Script sent for PT at [facility].                           Rick patient questionnaires have been collected today.

## 2023-10-18 ENCOUNTER — OFFICE VISIT (OUTPATIENT)
Dept: SPORTS MEDICINE | Facility: CLINIC | Age: 53
End: 2023-10-18
Payer: COMMERCIAL

## 2023-10-18 ENCOUNTER — HOSPITAL ENCOUNTER (OUTPATIENT)
Dept: RADIOLOGY | Facility: HOSPITAL | Age: 53
Discharge: HOME | End: 2023-10-18
Attending: ORTHOPAEDIC SURGERY
Payer: COMMERCIAL

## 2023-10-18 VITALS
HEIGHT: 70 IN | TEMPERATURE: 98 F | BODY MASS INDEX: 34.65 KG/M2 | DIASTOLIC BLOOD PRESSURE: 87 MMHG | HEART RATE: 89 BPM | WEIGHT: 242.06 LBS | SYSTOLIC BLOOD PRESSURE: 140 MMHG

## 2023-10-18 DIAGNOSIS — M23.92 INTERNAL DERANGEMENT OF LEFT KNEE: ICD-10-CM

## 2023-10-18 DIAGNOSIS — S83.511S RUPTURE OF ANTERIOR CRUCIATE LIGAMENT OF RIGHT KNEE, SEQUELA: ICD-10-CM

## 2023-10-18 DIAGNOSIS — M23.200 OLD COMPLEX TEAR OF LATERAL MENISCUS OF RIGHT KNEE: ICD-10-CM

## 2023-10-18 DIAGNOSIS — S83.511S RUPTURE OF ANTERIOR CRUCIATE LIGAMENT OF RIGHT KNEE, SEQUELA: Primary | ICD-10-CM

## 2023-10-18 DIAGNOSIS — S83.511A RUPTURE OF ANTERIOR CRUCIATE LIGAMENT OF RIGHT KNEE, INITIAL ENCOUNTER: Primary | ICD-10-CM

## 2023-10-18 PROCEDURE — 99214 PR OFFICE/OUTPT VISIT, EST, LEVL IV, 30-39 MIN: ICD-10-PCS | Mod: S$GLB,,, | Performed by: ORTHOPAEDIC SURGERY

## 2023-10-18 PROCEDURE — 73564 XR KNEE ORTHO BILAT WITH FLEXION: ICD-10-PCS | Mod: 26,,, | Performed by: RADIOLOGY

## 2023-10-18 PROCEDURE — 99214 OFFICE O/P EST MOD 30 MIN: CPT | Mod: S$GLB,,, | Performed by: ORTHOPAEDIC SURGERY

## 2023-10-18 PROCEDURE — 1159F MED LIST DOCD IN RCRD: CPT | Mod: CPTII,S$GLB,, | Performed by: ORTHOPAEDIC SURGERY

## 2023-10-18 PROCEDURE — 1160F RVW MEDS BY RX/DR IN RCRD: CPT | Mod: CPTII,S$GLB,, | Performed by: ORTHOPAEDIC SURGERY

## 2023-10-18 PROCEDURE — 3008F PR BODY MASS INDEX (BMI) DOCUMENTED: ICD-10-PCS | Mod: CPTII,S$GLB,, | Performed by: ORTHOPAEDIC SURGERY

## 2023-10-18 PROCEDURE — 3077F PR MOST RECENT SYSTOLIC BLOOD PRESSURE >= 140 MM HG: ICD-10-PCS | Mod: CPTII,S$GLB,, | Performed by: ORTHOPAEDIC SURGERY

## 2023-10-18 PROCEDURE — 99999 PR PBB SHADOW E&M-EST. PATIENT-LVL IV: CPT | Mod: PBBFAC,,, | Performed by: ORTHOPAEDIC SURGERY

## 2023-10-18 PROCEDURE — 73564 X-RAY EXAM KNEE 4 OR MORE: CPT | Mod: 26,,, | Performed by: RADIOLOGY

## 2023-10-18 PROCEDURE — 4010F PR ACE/ARB THEARPY RXD/TAKEN: ICD-10-PCS | Mod: CPTII,S$GLB,, | Performed by: ORTHOPAEDIC SURGERY

## 2023-10-18 PROCEDURE — 3079F PR MOST RECENT DIASTOLIC BLOOD PRESSURE 80-89 MM HG: ICD-10-PCS | Mod: CPTII,S$GLB,, | Performed by: ORTHOPAEDIC SURGERY

## 2023-10-18 PROCEDURE — 73564 X-RAY EXAM KNEE 4 OR MORE: CPT | Mod: TC,50

## 2023-10-18 PROCEDURE — 1159F PR MEDICATION LIST DOCUMENTED IN MEDICAL RECORD: ICD-10-PCS | Mod: CPTII,S$GLB,, | Performed by: ORTHOPAEDIC SURGERY

## 2023-10-18 PROCEDURE — 3077F SYST BP >= 140 MM HG: CPT | Mod: CPTII,S$GLB,, | Performed by: ORTHOPAEDIC SURGERY

## 2023-10-18 PROCEDURE — 3079F DIAST BP 80-89 MM HG: CPT | Mod: CPTII,S$GLB,, | Performed by: ORTHOPAEDIC SURGERY

## 2023-10-18 PROCEDURE — 1160F PR REVIEW ALL MEDS BY PRESCRIBER/CLIN PHARMACIST DOCUMENTED: ICD-10-PCS | Mod: CPTII,S$GLB,, | Performed by: ORTHOPAEDIC SURGERY

## 2023-10-18 PROCEDURE — 3008F BODY MASS INDEX DOCD: CPT | Mod: CPTII,S$GLB,, | Performed by: ORTHOPAEDIC SURGERY

## 2023-10-18 PROCEDURE — 99999 PR PBB SHADOW E&M-EST. PATIENT-LVL IV: ICD-10-PCS | Mod: PBBFAC,,, | Performed by: ORTHOPAEDIC SURGERY

## 2023-10-18 PROCEDURE — 4010F ACE/ARB THERAPY RXD/TAKEN: CPT | Mod: CPTII,S$GLB,, | Performed by: ORTHOPAEDIC SURGERY

## 2023-10-22 ENCOUNTER — HOSPITAL ENCOUNTER (OUTPATIENT)
Dept: RADIOLOGY | Facility: HOSPITAL | Age: 53
Discharge: HOME | End: 2023-10-22
Attending: ORTHOPAEDIC SURGERY
Payer: COMMERCIAL

## 2023-10-22 DIAGNOSIS — S83.511S RUPTURE OF ANTERIOR CRUCIATE LIGAMENT OF RIGHT KNEE, SEQUELA: ICD-10-CM

## 2023-10-22 PROCEDURE — 73721 MRI JNT OF LWR EXTRE W/O DYE: CPT | Mod: TC,RT

## 2023-10-22 PROCEDURE — 73721 MRI KNEE WITHOUT CONTRAST RIGHT: ICD-10-PCS | Mod: 26,RT,, | Performed by: RADIOLOGY

## 2023-10-22 PROCEDURE — 73721 MRI JNT OF LWR EXTRE W/O DYE: CPT | Mod: 26,RT,, | Performed by: RADIOLOGY

## 2023-10-23 ENCOUNTER — OFFICE VISIT (OUTPATIENT)
Dept: SPORTS MEDICINE | Facility: CLINIC | Age: 53
End: 2023-10-23
Payer: COMMERCIAL

## 2023-10-23 DIAGNOSIS — M23.91 ACUTE INTERNAL DERANGEMENT OF RIGHT KNEE: ICD-10-CM

## 2023-10-23 DIAGNOSIS — M23.92 INTERNAL DERANGEMENT OF LEFT KNEE: ICD-10-CM

## 2023-10-23 DIAGNOSIS — S83.511S RUPTURE OF ANTERIOR CRUCIATE LIGAMENT OF RIGHT KNEE, SEQUELA: ICD-10-CM

## 2023-10-23 DIAGNOSIS — M23.200 OLD COMPLEX TEAR OF LATERAL MENISCUS OF RIGHT KNEE: Primary | ICD-10-CM

## 2023-10-23 DIAGNOSIS — M25.561 CHRONIC PAIN OF RIGHT KNEE: ICD-10-CM

## 2023-10-23 DIAGNOSIS — S83.511D RUPTURE OF ANTERIOR CRUCIATE LIGAMENT OF RIGHT KNEE, SUBSEQUENT ENCOUNTER: Primary | ICD-10-CM

## 2023-10-23 DIAGNOSIS — G89.29 CHRONIC PAIN OF RIGHT KNEE: ICD-10-CM

## 2023-10-23 PROCEDURE — 99214 PR OFFICE/OUTPT VISIT, EST, LEVL IV, 30-39 MIN: ICD-10-PCS | Mod: 95,,, | Performed by: ORTHOPAEDIC SURGERY

## 2023-10-23 PROCEDURE — 4010F PR ACE/ARB THEARPY RXD/TAKEN: ICD-10-PCS | Mod: CPTII,95,, | Performed by: ORTHOPAEDIC SURGERY

## 2023-10-23 PROCEDURE — 99214 OFFICE O/P EST MOD 30 MIN: CPT | Mod: 95,,, | Performed by: ORTHOPAEDIC SURGERY

## 2023-10-23 PROCEDURE — 4010F ACE/ARB THERAPY RXD/TAKEN: CPT | Mod: CPTII,95,, | Performed by: ORTHOPAEDIC SURGERY

## 2023-10-23 NOTE — PROGRESS NOTES
Telemedicine/Virtual Visit Documentation:     The patient location is: home    The chief complaint leading to consultation is: see HPI    VISIT TYPE X   Virtual visit with synchronous audio and video    Telephone E/M service      Total time spent with patient: see X matt on chart below.   More than half of the time was spent counseling or coordinating care including prognosis, differential diagnosis, risks and benefits of treatment, instructions, compliance risk reductions     EST MINUTES X   56669 5    13246 10    56609 15    42684 25 X    [id number] 40    NEW     11571 10    70323 20    56630 30    16890 45    93506 60    PHONE      5-10    74142 11-20    64578 21-30      H&P  Orthopaedics      SUBJECTIVE:     History of Present Illness:  Patient is a 53 y.o. male with right knee ACL tear and meniscal pathology after previous ACL repair. MRI result review.      Review of patient's allergies indicates:   Allergen Reactions    Percocet [oxycodone-acetaminophen] Hives       Past Medical History:   Diagnosis Date    Elevated cholesterol     Hay fever     Hypertension     Sleep apnea      Past Surgical History:   Procedure Laterality Date    ARTHROSCOPIC REPAIR OF ANTERIOR CRUCIATE LIGAMENT Right 7/10/2018    Procedure: REPAIR, KNEE, ACL, ARTHROSCOPIC;  Surgeon: Ryland Coffey MD;  Location: [facility];  Service: Orthopedics;  Laterality: Right;  regional without catheter adductor; Clonidine/Epi/Keterolac/Ropivacaine inj 30cc    CHONDROPLASTY Right 7/10/2018    Procedure: CHONDROPLASTY;  Surgeon: Ryland Coffey MD;  Location: [facility];  Service: Orthopedics;  Laterality: Right;    COLONOSCOPY N/A 5/16/2022    Procedure: COLONOSCOPY;  Surgeon: Inez Loredo MD;  Location: [address];  Service: Colon and Rectal;  Laterality: N/A;  order created from outside referral  Fully Vaccinated, prep instr mailed -ml    INJECTION OF STEROID Right 7/10/2018    Procedure: INJECTION, STEROID; amniox right knee;   Surgeon: Ryland Coffey MD;  Location: [facility];  Service: Orthopedics;  Laterality: Right;    KNEE SURGERY      SYNOVECTOMY Right 7/10/2018    Procedure: SYNOVECTOMY;  Surgeon: Ryland Coffey MD;  Location: [facility];  Service: Orthopedics;  Laterality: Right;    TENDON REPAIR Left     thumb    VASECTOMY       Family History   Problem Relation Age of Onset    Hypertension Mother     Heart disease Father     Cancer Father 65        prostate    Esophageal cancer Father     Cancer Sister         female     Social History     Tobacco Use    Smoking status: Every Day     Current packs/day: 0.00     Average packs/day: 0.5 packs/day for 30.0 years (15.0 ttl pk-yrs)     Types: Cigarettes     Start date: 2/9/1983     Last attempt to quit: 2/9/2013     Years since quitting: 10.7    Smokeless tobacco: Never    Tobacco comments:     15 cigarettes a day   Substance Use Topics    Alcohol use: Yes     Comment: 3x times weekly    Drug use: No        Review of Systems:  Patient denies constitutional symptoms, cardiac symptoms, respiratory symptoms, GI symptoms.  The remainder of the musculoskeletal ROS is included in the HPI.      OBJECTIVE:     Physical Exam:  Gen:  No acute distress  CV:  Peripherally well-perfused.  Pulses 2+ bilaterally.  Lungs:  Normal respiratory effort.  Abdomen:  Soft, non-tender, non-distended  Head/Neck:  Normocephalic.  Atraumatic. No TTP, AROM and PROM intact without pain  Neuro:  CN intact without deficit, SILT throughout B/L Upper & Lower Extremities    MSK:  No interval change    MRI Knee Without Contrast Right  Narrative: EXAMINATION:  MRI KNEE WITHOUT CONTRAST RIGHT    CLINICAL HISTORY:  Knee pain, chronic, negative xray (Age >= 5y);Concern for R ACL Injury;    TECHNIQUE:  Routine MRI evaluation of the right knee without contrast.    COMPARISON:  Radiograph 10/18/2023., MRI 11/02/2018.    FINDINGS:  Menisci: There is a complex, predominantly horizontal tear of the body segment and posterior horn of  the medial meniscus with displaced flaps into the inferior recess of the medial gutter and adjacent to the posterior root ligament and a 0.7 x 1.6 cm posterior parameniscal cyst.  The lateral meniscus demonstrates preserved morphology and signal intensity.    Ligaments: Postoperative change of ACL repair.  Abnormal morphology, signal intensity and orientation of the proximal 3rd of the repaired ACL consistent with a chronic full-thickness retear with superimposed scar tissue.  Mild periligamentous edema about the superficial MCL and posteromedial capsule, favored to represent reactive synovitis.  PCL and posterolateral corner structures are intact.    Extensor Mechanism: Patellofemoral alignment is maintained.  Quadriceps and patellar tendons are intact.  MPFL and medial/lateral retinacula are normal.    Cartilage:    *Patellofemoral: Chondral signal heterogeneity and partial-thickness fissuring throughout the lateral patellar facet.  Chondral signal heterogeneity and partial to full-thickness chondral fissuring throughout the medial and central trochlea.  No subchondral edema.  *Medial tibiofemoral: High-grade partial to full-thickness chondral fissuring at the central and posterior weight-bearing femoral condyle with a focal area of subchondral edema.  Partial to full-thickness chondral fissuring at the medial aspect of the central and anterior weight-bearing tibial plateau with moderate associated subchondral edema.  *Lateral tibiofemoral: Chondral signal heterogeneity throughout the weight-bearing tibial plateau.  No full-thickness defects or subchondral edema.  Bones: Tricompartmental marginal osteophytes.  No fractures.  No avascular necrosis.  No marrow infiltrative process.    Miscellaneous: Moderate joint effusion.  No popliteal cyst.  Medial gastrocnemius, lateral gastrocnemius, distal semimembranosus and visualized pes anserine tendons are intact.  Distal iliotibial band is normal.  Arthrofibrosis  throughout Hoffa's fat pad.  Visualized neurovascular structures demonstrate no significant abnormalities.  Impression: 1. Chronic complete retear of the proximal 3rd of the ACL with superimposed scar tissue.  2. Complex tear body segment/posterior horn medial meniscus with displaced flaps into the inferior recess of the medial gutter and adjacent to the posterior root ligament and a small posterior parameniscal cyst.  3. Periligamentous edema about the superficial MCL and posteromedial capsule/posterior oblique ligament, likely sequela of reactive synovitis.  4. Tricompartmental osteoarthritis with chondral loss as detailed above.  5. Joint effusion with synovitis.    Electronically signed by: Fausto Schneider MD  Date:    10/22/2023  Time:    15:35       ASSESSMENT/PLAN:     A/P: Fredy Taylor is a 53 y.o. Right knee ACL with large stump on MRI and meniscal tear noted.    Plan:  1. RTC in 2 weeks with Advanced Practice Provider. IKDC, SF-12 and KOOS was not filled out today in clinic. Patient will not fill out IKDC, SF-12 and KOOS on return.    2. Medications: Refills of the following Rx were sent to patients preferred Pharmacy:  No Refills Needed Today    3. Physical Therapy: Continue/Begin: Pre-Rehabilitation     4. HEP: N/A    5. Procedures/Procedural Planning:   We reviewed with Fredy today, the pathology and natural history of his diagnosis. We have discussed a variety of treatment options including medications, physical therapy and other alternative treatments. I also explained the indications, risks and benefits of surgery. After discussion, Fredy decided to proceed with surgery. The decision was made to go forward with: Right Knee ACL Reconstruction   (Plan for Autologous hamstring quadrupled with Biobrace augmentation)  Due to large stump remnant will use hamstring graft.  ALL added for augmentation and due to revision nature of the procedure     Right Knee:     1. 74594 Arthroscopy, Anterior Cruciate  Ligament Reconstruction  2.         72735 Extra-articular ligament reconstruction (ALL - Arthrex Internal     brace/Swivelock)  2. 26644 Arthroscopy, with meniscus repair (medial OR lateral)  3. 73961 Arthroscopy, debridement/shaving of articular cartilage (Chondroplasty),   4. 33207 Arthroscopy, with lysis of adhesions, and      Clearance Medically Necessary: x Yes    -PCP     Pre-Op Center Clearance Medically Necessary: x Yes     The details of the surgical procedure were explained, including the location of probable incisions and a description of likely hardware and/or grafts to be used.  The patient understands the likely convalescence after surgery.  Also, we have thoroughly discussed the risks, benefits and alternatives to surgery, including, but not limited to, the risk of infection, joint stiffness, blood clot (including DVT and/or pulmonary embolus), neurologic and vascular injury.  It was explained that, if tissue has been repaired or reconstructed, there is a chance of failure, which may require further management.      All of the patient's questions were answered and informed consent was obtained. The patient will contact us if they have any questions or concerns in the interim.    6. DME: Medial , needs to be fitted with an OSSUR medial  brace.    7. Work/Sport Status: Will need to take 12 weeks off from work after surgery to allow for healing and stabilization of his gait and strength.    8. Visit Summary: Above plan; he will drop his [facility] paperwork off and proceed in November as above.

## 2023-11-01 ENCOUNTER — CLINICAL SUPPORT (OUTPATIENT)
Dept: REHABILITATION | Facility: HOSPITAL | Age: 53
End: 2023-11-01
Attending: ORTHOPAEDIC SURGERY
Payer: COMMERCIAL

## 2023-11-01 DIAGNOSIS — M25.561 ACUTE PAIN OF RIGHT KNEE: ICD-10-CM

## 2023-11-01 DIAGNOSIS — S83.511A RUPTURE OF ANTERIOR CRUCIATE LIGAMENT OF RIGHT KNEE, INITIAL ENCOUNTER: ICD-10-CM

## 2023-11-01 PROCEDURE — 97110 THERAPEUTIC EXERCISES: CPT

## 2023-11-01 PROCEDURE — 97161 PT EVAL LOW COMPLEX 20 MIN: CPT

## 2023-11-01 PROCEDURE — 97140 MANUAL THERAPY 1/> REGIONS: CPT

## 2023-11-02 PROBLEM — M25.561 RIGHT KNEE PAIN: Status: ACTIVE | Noted: 2023-11-02

## 2023-11-02 NOTE — PLAN OF CARE
[facility]   Physical Therapy Initial Evaluation      Name: Fredy Taylor  [facility] Number: [id number]    Therapy Diagnosis:   Encounter Diagnosis   Name Primary?    Rupture of anterior cruciate ligament of right knee, initial encounter         Physician: Ryland Coffey MD    Physician Orders: PT Eval and Treat ACL tear  Medical Diagnosis from Referral: Rupture of anterior cruciate ligament of right knee, initial encounter [S83.511A]  Evaluation Date: 11/1/2023  Authorization Period Expiration: 11/30/23  Plan of Care Expiration: 12/31/23  Progress Note Due: 12/31/23  Visit # / Visits authorized: 1/ 1   FOTO: 1/3    Precautions: Standard     Time In: 200  Time Out: 300  Total Appointment Time (timed & untimed codes): 60 minutes    Subjective     Date of onset: 10/14/23    History of current condition - Fredy reports: walking down hill and lost his footing. Pt reports when he fell he caught wife and twisted on his R knee. Pt reports he didn't feel a pop but does endorse increased burning / swelling after that injury. Pt reports since the injury his knee hasn't felt unstable but burning persists at the medial side of the knee. Pt had an ACL repair in 2018 and achieved PLOF around 8 months after surgery. Pt enjoys being active on his property, hunting deer and hiking. Pt works as , would like to be ready for return to work after the 6 week matt if able.    Falls: other than most recent, no    Imaging: MRI studies: 10/18/23:   1. Chronic complete retear of the proximal 3rd of the ACL with superimposed scar tissue.  2. Complex tear body segment/posterior horn medial meniscus with displaced flaps into the inferior recess of the medial gutter and adjacent to the posterior root ligament and a small posterior parameniscal cyst.  3. Periligamentous edema about the superficial MCL and posteromedial capsule/posterior oblique ligament, likely sequela of reactive synovitis.  4. Tricompartmental  osteoarthritis with chondral loss as detailed above.  5. Joint effusion with synovitis.    Prior Therapy: PT following ACL repair in 2018  Social History: Pt lives with his wife and kids  Occupation:  on feet a lot,  Prior Level of Function: Pt independent with all ADLs, job responsibilities and participating in regular physical activity   Current Level of Function: Pt limited with ADL's, job duties and unable to perform physical activity at PLOF    Pain:  Current 0/10, worst 8/10, best 0/10   Location: right knee   Description: Dull, Burning, Tight, and Sharp  Aggravating Factors: Standing, Bending, Walking, Extension, Flexing, Lifting, and Getting out of bed/chair  Easing Factors: pain medication and ice    Patients goals:  climb ladders for hunting. Hiking, return to work 6 wks post op     Medical History:   Past Medical History:   Diagnosis Date    Elevated cholesterol     Hay fever     Hypertension     Sleep apnea        Surgical History:   Fredy Taylor  has a past surgical history that includes Vasectomy; Tendon repair (Left); Arthroscopic repair of anterior cruciate ligament (Right, 7/10/2018); Chondroplasty (Right, 7/10/2018); Synovectomy (Right, 7/10/2018); Injection of steroid (Right, 7/10/2018); Knee surgery; and Colonoscopy (N/A, 5/16/2022).    Medications:   Fredy has a current medication list which includes the following prescription(s): aspirin, bupropion, clotrimazole-betamethasone 1-0.05%, fluticasone propionate, hydrocodone-acetaminophen, ibuprofen, losartan, promethazine, and sulfamethoxazole-trimethoprim 800-160mg.    Allergies:   Review of patient's allergies indicates:   Allergen Reactions    Percocet [oxycodone-acetaminophen] Hives        Objective      Observation: no swelling, no antalgic gait    Range of Motion:   Knee Right Left   Active 0-133 0-137   Passive 0-133 0-137     Lower Extremity Strength  Right LE  Left LE    Knee extension: 3+/5 Knee extension: 5/5   Knee flexion:  "3+/5 Knee flexion: 5/5   Hip flexion: 4+/5 Hip flexion: 5/5   Hip extension:  4-/5 Hip extension: 4-/5   Hip abduction: 3+/5 Hip abduction: 4-/5   Hip adduction: 5/5 Hip adduction 5/5   Ankle dorsiflexion: 5/5 Ankle dorsiflexion: 5/5   Ankle plantarflexion: 5/5 Ankle plantarflexion: 5/5     Special Tests:   Left Right   Valgus Stress Test - -   Varus Stress test - -   Lachman's test - +   Posterior Lachman - -   Vandana's Test - -   Apley's Compression - -   Patellar Grind Test + +     Step down test: NT    Function:    - SLR on R: can perform without lag  Quad set: fair, improves w/ cuing  - Squat: Pt squats past parallel without pain  - SL squat: pt squats to 40 deg without pain on R   - Sit <--> Stand: equal weight shift, no pain     Joint Mobility: patellar mobility slightly restricted on R    Palpation:  TTP to infrapatellar fat pad     Sensation: Intact    Intake Outcome Measure for FOTO Knee Survey    Therapist reviewed FOTO scores for Fredy Taylor on 11/1/2023.   FOTO documents entered into [employer] - see Media section.         Treatment     Total Treatment time (time-based codes) separate from Evaluation: 31 minutes     Fredy received the treatments listed below:      therapeutic exercises to develop strength, endurance, ROM, flexibility, posture, and core stabilization for 20 minutes including:    HS strap stretch off EOT 2x30 sec  Gastroc strap stretch 2x30 sec  Quad set 5 sec hold x20  SAQ 2x20  SLR 2x10  DL bridge 2x10  Prone quad stretch 2x30"  Education on post op precautions, gait mechanics, POC, prognosis, return to activity     manual therapy techniques: Joint mobilizations were applied to the: R knee for 11 minutes, including:    Patellar mobs all directions  Fat pad mob  Extension hinge stretch    Patient Education and Home Exercises     Education provided:   - post op precautions, gait mechanics, POC, prognosis, return to activity     Written Home Exercises Provided: yes. Exercises were reviewed " and Fredy was able to demonstrate them prior to the end of the session.  Fredy demonstrated good  understanding of the education provided. See EMR under Patient Instructions for exercises provided during therapy sessions.    Assessment     Fredy is a 53 y.o. male referred to outpatient Physical Therapy with a medical diagnosis of R ACL tear. Pt presents with decreased strength, decreased ROM, decreased flexibility, faulty posture, and increased pain. Due to impairments, pt is unable to perform ADL's, tolerate work duties or participate in physical activity at [facility].    Patient prognosis is Good.   Patient will benefit from skilled outpatient Physical Therapy to address the deficits stated above and in the chart below, provide patient /family education, and to maximize patientt's level of independence.     Plan of care discussed with patient: Yes  Patient's spiritual, cultural and educational needs considered and patient is agreeable to the plan of care and goals as stated below:     Anticipated Barriers for therapy: work schedule    Medical Necessity is demonstrated by the following  History  Co-morbidities and personal factors that may impact the plan of care [x] LOW: no personal factors / co-morbidities  [] MODERATE: 1-2 personal factors / co-morbidities  [] HIGH: 3+ personal factors / co-morbidities    Moderate / High Support Documentation:   Co-morbidities affecting plan of care: See medical chart    Personal Factors:   no deficits     Examination  Body Structures and Functions, activity limitations and participation restrictions that may impact the plan of care [x] LOW: addressing 1-2 elements  [] MODERATE: 3+ elements  [] HIGH: 4+ elements (please support below)    Moderate / High Support Documentation:      Clinical Presentation [x] LOW: stable  [] MODERATE: Evolving  [] HIGH: Unstable     Decision Making/ Complexity Score: low       Goals:  Short Term Goals: 2 weeks   Pt will be independent with HEP and  report compliance at least 4 days/week  Pt will demonstrate full symmetrical knee ROM without pain  Pt will demonstrate good understanding of post op precautions and T-scope  Pt will be able to perform 3x15 SLR without lag in order to demonstrate good quad tone for surgery    Plan     Plan of care Certification: 11/1/2023 to 12/31/23.    Outpatient Physical Therapy 2 times weekly for 3 weeks to include the following interventions: Electrical Stimulation TENS/NMES, Gait Training, Manual Therapy, Moist Heat/ Ice, Neuromuscular Re-ed, Patient Education, Self Care, Therapeutic Activities, and Therapeutic Exercise.     Plan for formal re-evaluation POD# 4    Keagan Domínguez, PT

## 2023-11-06 ENCOUNTER — CLINICAL SUPPORT (OUTPATIENT)
Dept: REHABILITATION | Facility: HOSPITAL | Age: 53
End: 2023-11-06
Payer: COMMERCIAL

## 2023-11-06 ENCOUNTER — OFFICE VISIT (OUTPATIENT)
Dept: SPORTS MEDICINE | Facility: CLINIC | Age: 53
End: 2023-11-06
Payer: COMMERCIAL

## 2023-11-06 VITALS
BODY MASS INDEX: 34.48 KG/M2 | DIASTOLIC BLOOD PRESSURE: 77 MMHG | WEIGHT: 240.31 LBS | SYSTOLIC BLOOD PRESSURE: 125 MMHG | HEART RATE: 90 BPM

## 2023-11-06 DIAGNOSIS — M23.200 OTHER OLD TEAR OF LATERAL MENISCUS OF RIGHT KNEE: ICD-10-CM

## 2023-11-06 DIAGNOSIS — M25.561 ACUTE PAIN OF RIGHT KNEE: Primary | ICD-10-CM

## 2023-11-06 DIAGNOSIS — M23.91 KNEE INTERNAL DERANGEMENT, RIGHT: ICD-10-CM

## 2023-11-06 DIAGNOSIS — S83.511D RUPTURE OF ANTERIOR CRUCIATE LIGAMENT OF RIGHT KNEE, SUBSEQUENT ENCOUNTER: Primary | ICD-10-CM

## 2023-11-06 PROCEDURE — 99999 PR PBB SHADOW E&M-EST. PATIENT-LVL III: ICD-10-PCS | Mod: PBBFAC,,, | Performed by: PHYSICIAN ASSISTANT

## 2023-11-06 PROCEDURE — 99499 NO LOS: ICD-10-PCS | Mod: S$GLB,,, | Performed by: PHYSICIAN ASSISTANT

## 2023-11-06 PROCEDURE — 97112 NEUROMUSCULAR REEDUCATION: CPT

## 2023-11-06 PROCEDURE — 97110 THERAPEUTIC EXERCISES: CPT

## 2023-11-06 PROCEDURE — 99999 PR PBB SHADOW E&M-EST. PATIENT-LVL III: CPT | Mod: PBBFAC,,, | Performed by: PHYSICIAN ASSISTANT

## 2023-11-06 PROCEDURE — 97140 MANUAL THERAPY 1/> REGIONS: CPT

## 2023-11-06 PROCEDURE — 99499 UNLISTED E&M SERVICE: CPT | Mod: S$GLB,,, | Performed by: PHYSICIAN ASSISTANT

## 2023-11-06 RX ORDER — PROMETHAZINE HYDROCHLORIDE 25 MG/1
25 TABLET ORAL EVERY 6 HOURS PRN
Qty: 30 TABLET | Refills: 0 | Status: SHIPPED | OUTPATIENT
Start: 2023-11-06 | End: 2023-12-06

## 2023-11-06 RX ORDER — METHOCARBAMOL 500 MG/1
500 TABLET, FILM COATED ORAL 3 TIMES DAILY
Qty: 30 TABLET | Refills: 0 | Status: SHIPPED | OUTPATIENT
Start: 2023-11-06 | End: 2023-11-19

## 2023-11-06 RX ORDER — ROPIVACAINE/EPI/CLONIDINE/KET 2.46-0.005
SYRINGE (ML) INJECTION ONCE
Status: CANCELLED | OUTPATIENT
Start: 2023-11-06 | End: 2023-11-06

## 2023-11-06 RX ORDER — SODIUM CHLORIDE 9 MG/ML
INJECTION, SOLUTION INTRAVENOUS CONTINUOUS
Status: CANCELLED | OUTPATIENT
Start: 2023-11-06

## 2023-11-06 RX ORDER — ASPIRIN 325 MG
325 TABLET, DELAYED RELEASE (ENTERIC COATED) ORAL DAILY
Qty: 42 TABLET | Refills: 0 | Status: SHIPPED | OUTPATIENT
Start: 2023-11-06 | End: 2023-12-21

## 2023-11-06 RX ORDER — CEFAZOLIN SODIUM 2 G/50ML
2 SOLUTION INTRAVENOUS
Status: CANCELLED | OUTPATIENT
Start: 2023-11-06

## 2023-11-06 RX ORDER — HYDROCODONE BITARTRATE AND ACETAMINOPHEN 10; 325 MG/1; MG/1
1 TABLET ORAL EVERY 6 HOURS PRN
Qty: 28 TABLET | Refills: 0 | Status: SHIPPED | OUTPATIENT
Start: 2023-11-06

## 2023-11-06 RX ORDER — CELECOXIB 200 MG/1
200 CAPSULE ORAL 2 TIMES DAILY
Qty: 60 CAPSULE | Refills: 2 | Status: SHIPPED | OUTPATIENT
Start: 2023-11-06

## 2023-11-06 RX ORDER — PREGABALIN 75 MG/1
75 CAPSULE ORAL 2 TIMES DAILY
Qty: 60 CAPSULE | Refills: 1 | Status: SHIPPED | OUTPATIENT
Start: 2023-11-06 | End: 2024-01-05

## 2023-11-06 NOTE — PROGRESS NOTES
"[facility]   Physical Therapy Treatment Note        Name: Fredy Taylor  [facility] Number: [id number]    Therapy Diagnosis:   Encounter Diagnosis   Name Primary?    Acute pain of right knee Yes     Physician: Ryland Coffey MD    Visit Date: 11/6/2023    Physician: Ryland Coffey MD     Physician Orders: PT Eval and Treat ACL tear  Medical Diagnosis from Referral: Rupture of anterior cruciate ligament of right knee, initial encounter [S83.511A]  Evaluation Date: 11/1/2023  Authorization Period Expiration: 11/30/23  Plan of Care Expiration: 12/31/23  Progress Note Due: 12/31/23  Visit # / Visits authorized: 1/20   FOTO: 1/3     Precautions: Standard     Time In: 2:30 pm   Time Out: 3:25 pm   Total Billable Time: 25 minutes     Subjective     Patient reports: just had his pre-op visit and is ready for surgery. Mild soreness in front of knee.     He was compliant with home exercise program.  Response to previous treatment: n/a, initial eval   Functional change: n/a, initial eval     Pain: 0/10     Location: right knee    Objective      Objective Measures updated at progress report or POC update only unless otherwise noted.     Observation: no swelling, no antalgic gait     Range of Motion:   Knee Right Left   Active 0-133 0-137   Passive 0-133 0-137        Function:  - Quad set: good   - SLR on R: can perform without lag  - Squat: Pt squats past parallel without pain      Treatment     Fredy received the treatments listed below:     MANUAL THERAPY TECHNIQUES were applied for (8) minutes, including:  Patellar mobs all directions  Fat pad mob  Extension hinge stretch  Range of motion reassessment      THERAPEUTIC EXERCISES to develop strength, endurance, ROM, flexibility, posture, and core stabilization for (30) minutes including:  HS strap stretch off EOT 2x30 sec  Prone quad stretch 10" hold x 10   Gastroc strap stretch 2x30 sec  Quad set 5 sec hold x20  SLR 3x10  DL bridge 2x15  SL bridge " "2x10 B   LAQ w/ 3" hold 3x15       NEUROMUSCULAR RE-EDUCATION ACTIVITIES to improve Balance, Coordination, Kinesthetic, Sense, Proprioception, and Posture for (17) minutes.  The following were included:  Lateral Band Walks 3 laps GTB   Monster Walks 3 laps GTB   SL KB pass around 3x10   SL Squat to Box 20" 3x8       THERAPEUTIC ACTIVITIES to improve dynamic and functional  performance for (0) minutes including:          Patient Education and Home Exercises       Home Exercises Provided and Patient Education Provided     Education provided:   post op precautions, gait mechanics, POC, prognosis, return to activity, HEP     Written Home Exercises Provided: yes.  Exercises were reviewed and Fredy was able to demonstrate them prior to the end of the session.  Fredy demonstrated good  understanding of the education provided. See EMR under Patient Instructions for exercises provided during therapy sessions.    Assessment     Fredy presented to therapy maintaining range of motion from initial evaluation. He demonstrated good quad control via quad sets and SLR without lag for multiple sets and reps. Pt noted fatigue with quad activation exercises. Pt had slight weight shift with squatting but able to correct after one verbal cue. Will reassess next visit when pt is post-op.     Fredy is progressing well towards his goals.   Patient prognosis is Good.     Patient will continue to benefit from skilled outpatient physical therapy to address the deficits listed in the problem list box on initial evaluation, provide pt/family education and to maximize patient's level of independence in the home and community environment.     Patient's spiritual, cultural and educational needs considered and pt agreeable to plan of care and goals.     Anticipated barriers to physical therapy: work schedule      Goals:  Short Term Goals: 2 weeks   Pt will be independent with HEP and report compliance at least 4 days/week  Pt will demonstrate full " symmetrical knee ROM without pain  Pt will demonstrate good understanding of post op precautions and T-scope  Pt will be able to perform 3x15 SLR without lag in order to demonstrate good quad tone for surgery    Plan     Continue Plan of Care (POC) and progress per patient tolerance. Reassess next visit.       Aster Elam, PT, DPT

## 2023-11-06 NOTE — H&P (VIEW-ONLY)
Fredy Taylor  is here for a completion of his perioperative paperwork. he  Is scheduled to undergo Right Knee:     1. 54962 Arthroscopy, Anterior Cruciate Ligament Reconstruction  2. 39464 Extra-articular ligament reconstruction (ALL - Arthrex Internal     brace/Swivelock)  2. 45016 Arthroscopy, with meniscus repair (medial OR lateral)  3. 01892 Arthroscopy, debridement/shaving of articular cartilage (Chondroplasty),   4. 05995 Arthroscopy, with lysis of adhesions, on 11/9/23.      He is a healthy individual and does not need clearance for this procedure.     Risks, indications and benefits of the surgical procedure were discussed with the patient. All questions with regard to surgery, rehab, expected return to functional activities, activities of daily living and recreational endeavors were answered to his satisfaction.    Discussed COVID-19 with the patient, they are aware of our current policies and procedures, were given the option of delaying surgery, and they elect to proceed.    Patient was informed and understands the risks of surgery are greater for patients with a current condition or history of heart disease, obesity, clotting disorders, recurrent infections, steroid use, current or past smoking, and factors such as sedentary lifestyle and noncompliance with medications, therapy or follow-up. The degree of the increased risk is hard to estimate with any degree of precision.    Once no other questions were asked, a brief history and physical exam was then performed.    PAST MEDICAL HISTORY:   Past Medical History:   Diagnosis Date    Elevated cholesterol     Hay fever     Hypertension     Sleep apnea      PAST SURGICAL HISTORY:   Past Surgical History:   Procedure Laterality Date    ARTHROSCOPIC REPAIR OF ANTERIOR CRUCIATE LIGAMENT Right 7/10/2018    Procedure: REPAIR, KNEE, ACL, ARTHROSCOPIC;  Surgeon: Ryland Coffey MD;  Location: [facility];  Service: Orthopedics;  Laterality: Right;  regional without  catheter adductor; Clonidine/Epi/Keterolac/Ropivacaine inj 30cc    CHONDROPLASTY Right 7/10/2018    Procedure: CHONDROPLASTY;  Surgeon: Ryland Coffey MD;  Location: [facility];  Service: Orthopedics;  Laterality: Right;    COLONOSCOPY N/A 5/16/2022    Procedure: COLONOSCOPY;  Surgeon: Inez Loredo MD;  Location: [facility] (4TH FLR);  Service: Colon and Rectal;  Laterality: N/A;  order created from outside referral  Fully Vaccinated, prep instr mailed -ml    INJECTION OF STEROID Right 7/10/2018    Procedure: INJECTION, STEROID; amniox right knee;  Surgeon: Ryland Coffey MD;  Location: [facility];  Service: Orthopedics;  Laterality: Right;    KNEE SURGERY      SYNOVECTOMY Right 7/10/2018    Procedure: SYNOVECTOMY;  Surgeon: Ryland Coffey MD;  Location: [facility];  Service: Orthopedics;  Laterality: Right;    TENDON REPAIR Left     thumb    VASECTOMY       FAMILY HISTORY:   Family History   Problem Relation Age of Onset    Hypertension Mother     Heart disease Father     Cancer Father 65        prostate    Esophageal cancer Father     Cancer Sister         female     SOCIAL HISTORY:   Social History     Socioeconomic History    Marital status:    Occupational History     Employer: [employer]     Employer: [employer]   Tobacco Use    Smoking status: Every Day     Current packs/day: 0.00     Average packs/day: 0.5 packs/day for 30.0 years (15.0 ttl pk-yrs)     Types: Cigarettes     Start date: 2/9/1983     Last attempt to quit: 2/9/2013     Years since quitting: 10.7    Smokeless tobacco: Never    Tobacco comments:     15 cigarettes a day   Substance and Sexual Activity    Alcohol use: Yes     Comment: 3x times weekly    Drug use: No       MEDICATIONS:   Current Outpatient Medications:     buPROPion (WELLBUTRIN) 100 MG tablet, Take 20 mg by mouth once daily., Disp: , Rfl:     clotrimazole-betamethasone 1-0.05% (LOTRISONE) cream, Apply topically 2 (two) times daily., Disp: 45 g, Rfl: 0    fluticasone (FLONASE)  50 mcg/actuation nasal spray, 1 spray by Each Nare route once daily., Disp: , Rfl:     aspirin 325 MG tablet, Take 1 tablet (325 mg total) by mouth once daily., Disp: 42 tablet, Rfl: 0    HYDROcodone-acetaminophen (NORCO)  mg per tablet, Take 1 tablet by mouth every 6 (six) hours as needed for Pain. (Patient not taking: Reported on 11/6/2023), Disp: 50 tablet, Rfl: 0    ibuprofen (ADVIL,MOTRIN) 600 MG tablet, Take 600 mg by mouth 3 (three) times daily., Disp: , Rfl:     losartan (COZAAR) 25 MG tablet, Take 1 tablet (25 mg total) by mouth once daily. (Patient taking differently: Take 25 mg by mouth every evening.), Disp: 30 tablet, Rfl: 6    promethazine (PHENERGAN) 25 MG tablet, Take 1 tablet (25 mg total) by mouth every 6 (six) hours as needed for Nausea. (Patient not taking: Reported on 11/6/2023), Disp: 20 tablet, Rfl: 0    sulfamethoxazole-trimethoprim 800-160mg (BACTRIM DS) 800-160 mg Tab, Take 1 tablet by mouth 2 (two) times daily. (Patient not taking: Reported on 11/6/2023), Disp: 20 tablet, Rfl: 0  ALLERGIES:   Review of patient's allergies indicates:   Allergen Reactions    Percocet [oxycodone-acetaminophen] Hives       Review of Systems   Constitution: Negative. Negative for chills, fever and night sweats.   HENT: Negative for congestion and headaches.    Eyes: Negative for blurred vision, left vision loss and right vision loss.   Cardiovascular: Negative for chest pain and syncope.   Respiratory: Negative for cough and shortness of breath.    Endocrine: Negative for polydipsia, polyphagia and polyuria.   Hematologic/Lymphatic: Negative for bleeding problem. Does not bruise/bleed easily.   Skin: Negative for dry skin, itching and rash.   Musculoskeletal: Negative for falls and muscle weakness.   Gastrointestinal: Negative for abdominal pain and bowel incontinence.   Genitourinary: Negative for bladder incontinence and nocturia.   Neurological: Negative for disturbances in coordination, loss of  balance and seizures.   Psychiatric/Behavioral: Negative for depression. The patient does not have insomnia.    Allergic/Immunologic: Negative for hives and persistent infections.     PHYSICAL EXAM:  GEN: A&Ox3, WD WN NAD  HEENT: WNL  CHEST: CTAB, no W/R/R  HEART: RRR, no M/R/G   ABD: Soft, NT ND, BS x4 QUADS  MS: Refer to previous note for detailed MS exam  NEURO: CN II-XII intact       The surgical consent was then reviewed with the patient, who agreed with all the contents of the consent form and it was signed. he was instructed to wait for a phone call from the anesthesia department prior to surgery to discuss past medical history, medications, and clearance. Also, informed he may be required to get additional testing per the anesthesia department prior to having surgery.     PHYSICAL THERAPY:  He was also instructed regarding physical therapy and will begin POD # 1-3. He is doing physical therapy at [facility]. Keagan Domínguez.    POST OP CARE:instructions were reviewed including care of the wound and dressing after surgery and when he can shower.     PAIN MANAGEMENT: Fredy Taylor was also given a pain management regime, which includes the TENS unit given to him by our DME team, along with the education required for its use. He was also instructed regarding the ice wrap that will be in place after surgery and his postoperative pain medications.     PAIN MEDICATION:  Norco 10/325mg 1 po q 4-6 hours prn pain  Phenergan 25 mg one p.o. q.4-6 hours p.r.n. nausea and vomiting.  Celebrex 200 mg BID  Robaxin 500mg TID PRN  Lyrica 75mg BID  Aspirin 325mg daily x 6 weeks for DVT prophylaxis starting on the evening after surgery.    Post op meds to be delivered bedside prior to discharge. Deliver to family if patient is in surgery at 5pm.     Patient was instructed not to take benzodiazepines with opioid and Lyrica during the perioperative period. Patient denies history of seizures.      Patient will also use bilateral TEDs on lower extremities, SCDs during surgery, and early ambulation post-op. If the patient was previously taking 81mg baby aspirin, they may have been told not to hold for procedure.     Patient was also told to buy over the counter Prilosec medication and take it once daily for GI protection as long as they are taking NSAIDs or Aspirin.    DVT prophylaxis was discussed with the patient today including risk factors for developing DVTs and history of DVTs. The patient was asked if any specific recommendations were given from the doctor/s that did pre-operative surgical clearance. I may have prescribed a mechanical DVT prophylaxis device, , for the above listed patient, to reduce the risk for clot formation and complications that can arise from a DVT. In my professional medical opinion, I consider this medically necessary and have prescribed the device for the purpose of postoperative treatment and rehabilitation. This can treat both the acute and chronic aspects of the inflammatory reaction that accompanies trauma and surgery. Additionally, I am prescribing mechanical DVT prophylaxis because the patient will have restricted mobility post-operatively and is at high risk for DVT. Failure to approve this device will compromise the outcome of this patient's healing process.     The patient was told that narcotic pain medications may make them drowsy and instructions were given to not sign legal documents, drive or operate heavy machinery, cars, or equipment while under the influence of narcotic medications.     Dr. Coffey was present in clinic during this pre-op evaluation. The patient was offered the opportunity to ask Dr. Coffey any further questions regarding the procedure which may not have been addressed during their previous informed consent discussion. The patient has declined to see Dr. Coffey today.    As there were no other questions to be asked, he was given my business  card along with Dr. Coffey business card if he has any questions or concerns prior to surgery or in the postop period.

## 2023-11-06 NOTE — H&P
Fredy Taylor  is here for a completion of his perioperative paperwork. he  Is scheduled to undergo Right Knee:     1. 17939 Arthroscopy, Anterior Cruciate Ligament Reconstruction  2. 33100 Extra-articular ligament reconstruction (ALL - Arthrex Internal     brace/Swivelock)  2. 36896 Arthroscopy, with meniscus repair (medial OR lateral)  3. 51844 Arthroscopy, debridement/shaving of articular cartilage (Chondroplasty),   4. 32614 Arthroscopy, with lysis of adhesions, on 11/9/23.      He is a healthy individual and does not need clearance for this procedure.     Risks, indications and benefits of the surgical procedure were discussed with the patient. All questions with regard to surgery, rehab, expected return to functional activities, activities of daily living and recreational endeavors were answered to his satisfaction.    Discussed COVID-19 with the patient, they are aware of our current policies and procedures, were given the option of delaying surgery, and they elect to proceed.    Patient was informed and understands the risks of surgery are greater for patients with a current condition or history of heart disease, obesity, clotting disorders, recurrent infections, steroid use, current or past smoking, and factors such as sedentary lifestyle and noncompliance with medications, therapy or follow-up. The degree of the increased risk is hard to estimate with any degree of precision.    Once no other questions were asked, a brief history and physical exam was then performed.    PAST MEDICAL HISTORY:   Past Medical History:   Diagnosis Date    Elevated cholesterol     Hay fever     Hypertension     Sleep apnea      PAST SURGICAL HISTORY:   Past Surgical History:   Procedure Laterality Date    ARTHROSCOPIC REPAIR OF ANTERIOR CRUCIATE LIGAMENT Right 7/10/2018    Procedure: REPAIR, KNEE, ACL, ARTHROSCOPIC;  Surgeon: Ryland Coffey MD;  Location: [facility];  Service: Orthopedics;  Laterality: Right;  regional without  catheter adductor; Clonidine/Epi/Keterolac/Ropivacaine inj 30cc    CHONDROPLASTY Right 7/10/2018    Procedure: CHONDROPLASTY;  Surgeon: Ryland Coffey MD;  Location: [facility];  Service: Orthopedics;  Laterality: Right;    COLONOSCOPY N/A 5/16/2022    Procedure: COLONOSCOPY;  Surgeon: Inez Loredo MD;  Location: [facility] (4TH FLR);  Service: Colon and Rectal;  Laterality: N/A;  order created from outside referral  Fully Vaccinated, prep instr mailed -ml    INJECTION OF STEROID Right 7/10/2018    Procedure: INJECTION, STEROID; amniox right knee;  Surgeon: Ryland Coffey MD;  Location: [facility];  Service: Orthopedics;  Laterality: Right;    KNEE SURGERY      SYNOVECTOMY Right 7/10/2018    Procedure: SYNOVECTOMY;  Surgeon: Ryland Coffey MD;  Location: [facility];  Service: Orthopedics;  Laterality: Right;    TENDON REPAIR Left     thumb    VASECTOMY       FAMILY HISTORY:   Family History   Problem Relation Age of Onset    Hypertension Mother     Heart disease Father     Cancer Father 65        prostate    Esophageal cancer Father     Cancer Sister         female     SOCIAL HISTORY:   Social History     Socioeconomic History    Marital status:    Occupational History     Employer: [employer]     Employer: [employer]   Tobacco Use    Smoking status: Every Day     Current packs/day: 0.00     Average packs/day: 0.5 packs/day for 30.0 years (15.0 ttl pk-yrs)     Types: Cigarettes     Start date: 2/9/1983     Last attempt to quit: 2/9/2013     Years since quitting: 10.7    Smokeless tobacco: Never    Tobacco comments:     15 cigarettes a day   Substance and Sexual Activity    Alcohol use: Yes     Comment: 3x times weekly    Drug use: No       MEDICATIONS:   Current Outpatient Medications:     buPROPion (WELLBUTRIN) 100 MG tablet, Take 20 mg by mouth once daily., Disp: , Rfl:     clotrimazole-betamethasone 1-0.05% (LOTRISONE) cream, Apply topically 2 (two) times daily., Disp: 45 g, Rfl: 0    fluticasone (FLONASE)  50 mcg/actuation nasal spray, 1 spray by Each Nare route once daily., Disp: , Rfl:     aspirin 325 MG tablet, Take 1 tablet (325 mg total) by mouth once daily., Disp: 42 tablet, Rfl: 0    HYDROcodone-acetaminophen (NORCO)  mg per tablet, Take 1 tablet by mouth every 6 (six) hours as needed for Pain. (Patient not taking: Reported on 11/6/2023), Disp: 50 tablet, Rfl: 0    ibuprofen (ADVIL,MOTRIN) 600 MG tablet, Take 600 mg by mouth 3 (three) times daily., Disp: , Rfl:     losartan (COZAAR) 25 MG tablet, Take 1 tablet (25 mg total) by mouth once daily. (Patient taking differently: Take 25 mg by mouth every evening.), Disp: 30 tablet, Rfl: 6    promethazine (PHENERGAN) 25 MG tablet, Take 1 tablet (25 mg total) by mouth every 6 (six) hours as needed for Nausea. (Patient not taking: Reported on 11/6/2023), Disp: 20 tablet, Rfl: 0    sulfamethoxazole-trimethoprim 800-160mg (BACTRIM DS) 800-160 mg Tab, Take 1 tablet by mouth 2 (two) times daily. (Patient not taking: Reported on 11/6/2023), Disp: 20 tablet, Rfl: 0  ALLERGIES:   Review of patient's allergies indicates:   Allergen Reactions    Percocet [oxycodone-acetaminophen] Hives       Review of Systems   Constitution: Negative. Negative for chills, fever and night sweats.   HENT: Negative for congestion and headaches.    Eyes: Negative for blurred vision, left vision loss and right vision loss.   Cardiovascular: Negative for chest pain and syncope.   Respiratory: Negative for cough and shortness of breath.    Endocrine: Negative for polydipsia, polyphagia and polyuria.   Hematologic/Lymphatic: Negative for bleeding problem. Does not bruise/bleed easily.   Skin: Negative for dry skin, itching and rash.   Musculoskeletal: Negative for falls and muscle weakness.   Gastrointestinal: Negative for abdominal pain and bowel incontinence.   Genitourinary: Negative for bladder incontinence and nocturia.   Neurological: Negative for disturbances in coordination, loss of  balance and seizures.   Psychiatric/Behavioral: Negative for depression. The patient does not have insomnia.    Allergic/Immunologic: Negative for hives and persistent infections.     PHYSICAL EXAM:  GEN: A&Ox3, WD WN NAD  HEENT: WNL  CHEST: CTAB, no W/R/R  HEART: RRR, no M/R/G   ABD: Soft, NT ND, BS x4 QUADS  MS: Refer to previous note for detailed MS exam  NEURO: CN II-XII intact       The surgical consent was then reviewed with the patient, who agreed with all the contents of the consent form and it was signed. he was instructed to wait for a phone call from the anesthesia department prior to surgery to discuss past medical history, medications, and clearance. Also, informed he may be required to get additional testing per the anesthesia department prior to having surgery.     PHYSICAL THERAPY:  He was also instructed regarding physical therapy and will begin POD # 1-3. He is doing physical therapy at [facility]. Keagan Domínguez.    POST OP CARE:instructions were reviewed including care of the wound and dressing after surgery and when he can shower.     PAIN MANAGEMENT: Fredy Taylor was also given a pain management regime, which includes the TENS unit given to him by our DME team, along with the education required for its use. He was also instructed regarding the ice wrap that will be in place after surgery and his postoperative pain medications.     PAIN MEDICATION:  Norco 10/325mg 1 po q 4-6 hours prn pain  Phenergan 25 mg one p.o. q.4-6 hours p.r.n. nausea and vomiting.  Celebrex 200 mg BID  Robaxin 500mg TID PRN  Lyrica 75mg BID  Aspirin 325mg daily x 6 weeks for DVT prophylaxis starting on the evening after surgery.    Post op meds to be delivered bedside prior to discharge. Deliver to family if patient is in surgery at 5pm.     Patient was instructed not to take benzodiazepines with opioid and Lyrica during the perioperative period. Patient denies history of seizures.      Patient will also use bilateral TEDs on lower extremities, SCDs during surgery, and early ambulation post-op. If the patient was previously taking 81mg baby aspirin, they may have been told not to hold for procedure.     Patient was also told to buy over the counter Prilosec medication and take it once daily for GI protection as long as they are taking NSAIDs or Aspirin.    DVT prophylaxis was discussed with the patient today including risk factors for developing DVTs and history of DVTs. The patient was asked if any specific recommendations were given from the doctor/s that did pre-operative surgical clearance. I may have prescribed a mechanical DVT prophylaxis device, , for the above listed patient, to reduce the risk for clot formation and complications that can arise from a DVT. In my professional medical opinion, I consider this medically necessary and have prescribed the device for the purpose of postoperative treatment and rehabilitation. This can treat both the acute and chronic aspects of the inflammatory reaction that accompanies trauma and surgery. Additionally, I am prescribing mechanical DVT prophylaxis because the patient will have restricted mobility post-operatively and is at high risk for DVT. Failure to approve this device will compromise the outcome of this patient's healing process.     The patient was told that narcotic pain medications may make them drowsy and instructions were given to not sign legal documents, drive or operate heavy machinery, cars, or equipment while under the influence of narcotic medications.     Dr. Coffey was present in clinic during this pre-op evaluation. The patient was offered the opportunity to ask Dr. Coffey any further questions regarding the procedure which may not have been addressed during their previous informed consent discussion. The patient has declined to see Dr. Coffey today.    As there were no other questions to be asked, he was given my business  card along with Dr. Coffey business card if he has any questions or concerns prior to surgery or in the postop period.        Unresponsive trach

## 2023-11-07 ENCOUNTER — PATIENT MESSAGE (OUTPATIENT)
Dept: PREADMISSION TESTING | Facility: HOSPITAL | Age: 53
End: 2023-11-07
Payer: COMMERCIAL

## 2023-11-08 ENCOUNTER — ANESTHESIA EVENT (OUTPATIENT)
Dept: SURGERY | Facility: HOSPITAL | Age: 53
End: 2023-11-08
Payer: COMMERCIAL

## 2023-11-08 ENCOUNTER — TELEPHONE (OUTPATIENT)
Dept: SPORTS MEDICINE | Facility: CLINIC | Age: 53
End: 2023-11-08
Payer: COMMERCIAL

## 2023-11-09 ENCOUNTER — HOSPITAL ENCOUNTER (OUTPATIENT)
Facility: HOSPITAL | Age: 53
Discharge: HOME | End: 2023-11-09
Attending: ORTHOPAEDIC SURGERY | Admitting: ORTHOPAEDIC SURGERY
Payer: COMMERCIAL

## 2023-11-09 ENCOUNTER — ANESTHESIA (OUTPATIENT)
Dept: SURGERY | Facility: HOSPITAL | Age: 53
End: 2023-11-09
Payer: COMMERCIAL

## 2023-11-09 VITALS
RESPIRATION RATE: 16 BRPM | BODY MASS INDEX: 34.36 KG/M2 | HEIGHT: 70 IN | DIASTOLIC BLOOD PRESSURE: 67 MMHG | WEIGHT: 240 LBS | OXYGEN SATURATION: 91 % | HEART RATE: 79 BPM | SYSTOLIC BLOOD PRESSURE: 121 MMHG | TEMPERATURE: 98 F

## 2023-11-09 DIAGNOSIS — M23.200 OTHER OLD TEAR OF LATERAL MENISCUS OF RIGHT KNEE: ICD-10-CM

## 2023-11-09 DIAGNOSIS — M23.91 KNEE INTERNAL DERANGEMENT, RIGHT: ICD-10-CM

## 2023-11-09 DIAGNOSIS — S83.511D RUPTURE OF ANTERIOR CRUCIATE LIGAMENT OF RIGHT KNEE, SUBSEQUENT ENCOUNTER: ICD-10-CM

## 2023-11-09 PROCEDURE — 63600175 PHARM REV CODE 636 W HCPCS: Performed by: PHYSICIAN ASSISTANT

## 2023-11-09 PROCEDURE — 64448 RIGHT ADDUCTOR CANAL CATHETER: ICD-10-PCS | Mod: 59,RT,, | Performed by: ANESTHESIOLOGY

## 2023-11-09 PROCEDURE — 63600175 PHARM REV CODE 636 W HCPCS: Performed by: ANESTHESIOLOGY

## 2023-11-09 PROCEDURE — 99900035 HC TECH TIME PER 15 MIN (STAT)

## 2023-11-09 PROCEDURE — 94660 CPAP INITIATION&MGMT: CPT

## 2023-11-09 PROCEDURE — 36000711: Performed by: ORTHOPAEDIC SURGERY

## 2023-11-09 PROCEDURE — 27000190 HC CPAP FULL FACE MASK W/VALVE

## 2023-11-09 PROCEDURE — 63600175 PHARM REV CODE 636 W HCPCS: Performed by: NURSE ANESTHETIST, CERTIFIED REGISTERED

## 2023-11-09 PROCEDURE — 37000009 HC ANESTHESIA EA ADD 15 MINS: Performed by: ORTHOPAEDIC SURGERY

## 2023-11-09 PROCEDURE — 29881 ARTHRS KNE SRG MNISECTMY M/L: CPT | Mod: 51,RT,, | Performed by: ORTHOPAEDIC SURGERY

## 2023-11-09 PROCEDURE — C1751 CATH, INF, PER/CENT/MIDLINE: HCPCS | Performed by: ANESTHESIOLOGY

## 2023-11-09 PROCEDURE — 71000033 HC RECOVERY, INTIAL HOUR: Performed by: ORTHOPAEDIC SURGERY

## 2023-11-09 PROCEDURE — 25000003 PHARM REV CODE 250: Performed by: PHYSICIAN ASSISTANT

## 2023-11-09 PROCEDURE — 25000003 PHARM REV CODE 250: Performed by: ANESTHESIOLOGY

## 2023-11-09 PROCEDURE — C1762 CONN TISS, HUMAN(INC FASCIA): HCPCS | Performed by: ORTHOPAEDIC SURGERY

## 2023-11-09 PROCEDURE — D9220A PRA ANESTHESIA: ICD-10-PCS | Mod: CRNA,,, | Performed by: NURSE ANESTHETIST, CERTIFIED REGISTERED

## 2023-11-09 PROCEDURE — 25000003 PHARM REV CODE 250: Performed by: NURSE ANESTHETIST, CERTIFIED REGISTERED

## 2023-11-09 PROCEDURE — 29888 ARTHRS AID ACL RPR/AGMNTJ: CPT | Mod: RT,,, | Performed by: ORTHOPAEDIC SURGERY

## 2023-11-09 PROCEDURE — D9220A PRA ANESTHESIA: Mod: ANES,,, | Performed by: ANESTHESIOLOGY

## 2023-11-09 PROCEDURE — 37000008 HC ANESTHESIA 1ST 15 MINUTES: Performed by: ORTHOPAEDIC SURGERY

## 2023-11-09 PROCEDURE — 36000710: Performed by: ORTHOPAEDIC SURGERY

## 2023-11-09 PROCEDURE — 25000003 PHARM REV CODE 250: Performed by: ORTHOPAEDIC SURGERY

## 2023-11-09 PROCEDURE — 94640 AIRWAY INHALATION TREATMENT: CPT

## 2023-11-09 PROCEDURE — 63600175 PHARM REV CODE 636 W HCPCS: Performed by: ORTHOPAEDIC SURGERY

## 2023-11-09 PROCEDURE — 29888 PR KNEE SCOPE,AID ANT CRUCIATE REPAIR: ICD-10-PCS | Mod: RT,,, | Performed by: ORTHOPAEDIC SURGERY

## 2023-11-09 PROCEDURE — C1894 INTRO/SHEATH, NON-LASER: HCPCS | Performed by: ORTHOPAEDIC SURGERY

## 2023-11-09 PROCEDURE — 71000015 HC POSTOP RECOV 1ST HR: Performed by: ORTHOPAEDIC SURGERY

## 2023-11-09 PROCEDURE — 29881 PR KNEE SCOPE SINGLE MENISECECTOMY: ICD-10-PCS | Mod: 51,RT,, | Performed by: ORTHOPAEDIC SURGERY

## 2023-11-09 PROCEDURE — C1713 ANCHOR/SCREW BN/BN,TIS/BN: HCPCS | Performed by: ORTHOPAEDIC SURGERY

## 2023-11-09 PROCEDURE — 94761 N-INVAS EAR/PLS OXIMETRY MLT: CPT

## 2023-11-09 PROCEDURE — 64448 NJX AA&/STRD FEM NRV NFS IMG: CPT | Performed by: ANESTHESIOLOGY

## 2023-11-09 PROCEDURE — 27201423 OPTIME MED/SURG SUP & DEVICES STERILE SUPPLY: Performed by: ORTHOPAEDIC SURGERY

## 2023-11-09 PROCEDURE — 25000242 PHARM REV CODE 250 ALT 637 W/ HCPCS: Performed by: ANESTHESIOLOGY

## 2023-11-09 PROCEDURE — D9220A PRA ANESTHESIA: ICD-10-PCS | Mod: ANES,,, | Performed by: ANESTHESIOLOGY

## 2023-11-09 PROCEDURE — D9220A PRA ANESTHESIA: Mod: CRNA,,, | Performed by: NURSE ANESTHETIST, CERTIFIED REGISTERED

## 2023-11-09 DEVICE — SYS SWIVELOCK ANCH 4.75X19.1MM: Type: IMPLANTABLE DEVICE | Site: KNEE | Status: FUNCTIONAL

## 2023-11-09 DEVICE — LOOP CORT RIGID FIX ADJ STD: Type: IMPLANTABLE DEVICE | Site: KNEE | Status: FUNCTIONAL

## 2023-11-09 DEVICE — TENDON ANT TIBIALIS >20X>6MM: Type: IMPLANTABLE DEVICE | Site: KNEE | Status: FUNCTIONAL

## 2023-11-09 DEVICE — SHEATH SCREW LARGE 30MM: Type: IMPLANTABLE DEVICE | Site: KNEE | Status: FUNCTIONAL

## 2023-11-09 DEVICE — SCREW BIOINTRAFIX 8-10MMX30: Type: IMPLANTABLE DEVICE | Site: KNEE | Status: FUNCTIONAL

## 2023-11-09 RX ORDER — IPRATROPIUM BROMIDE AND ALBUTEROL SULFATE 2.5; .5 MG/3ML; MG/3ML
3 SOLUTION RESPIRATORY (INHALATION) ONCE
Status: COMPLETED | OUTPATIENT
Start: 2023-11-09 | End: 2023-11-09

## 2023-11-09 RX ORDER — ROPIVACAINE/EPI/CLONIDINE/KET 2.46-0.005
SYRINGE (ML) INJECTION
Status: DISCONTINUED | OUTPATIENT
Start: 2023-11-09 | End: 2023-11-09 | Stop reason: HOSPADM

## 2023-11-09 RX ORDER — LIDOCAINE HYDROCHLORIDE 20 MG/ML
INJECTION INTRAVENOUS
Status: DISCONTINUED | OUTPATIENT
Start: 2023-11-09 | End: 2023-11-09

## 2023-11-09 RX ORDER — ROPIVACAINE HYDROCHLORIDE 2 MG/ML
INJECTION, SOLUTION EPIDURAL; INFILTRATION; PERINEURAL CONTINUOUS
Status: DISCONTINUED | OUTPATIENT
Start: 2023-11-09 | End: 2023-11-09 | Stop reason: HOSPADM

## 2023-11-09 RX ORDER — SODIUM CHLORIDE 9 MG/ML
INJECTION, SOLUTION INTRAVENOUS CONTINUOUS
Status: DISCONTINUED | OUTPATIENT
Start: 2023-11-09 | End: 2023-11-09 | Stop reason: HOSPADM

## 2023-11-09 RX ORDER — FENTANYL CITRATE 50 UG/ML
100 INJECTION, SOLUTION INTRAMUSCULAR; INTRAVENOUS
Status: DISCONTINUED | OUTPATIENT
Start: 2023-11-09 | End: 2023-11-09 | Stop reason: HOSPADM

## 2023-11-09 RX ORDER — ROPIVACAINE HYDROCHLORIDE 5 MG/ML
INJECTION, SOLUTION EPIDURAL; INFILTRATION; PERINEURAL
Status: COMPLETED | OUTPATIENT
Start: 2023-11-09 | End: 2023-11-09

## 2023-11-09 RX ORDER — EPINEPHRINE 1 MG/ML
INJECTION, SOLUTION, CONCENTRATE INTRAVENOUS
Status: DISCONTINUED | OUTPATIENT
Start: 2023-11-09 | End: 2023-11-09 | Stop reason: HOSPADM

## 2023-11-09 RX ORDER — MIDAZOLAM HYDROCHLORIDE 1 MG/ML
1 INJECTION INTRAMUSCULAR; INTRAVENOUS
Status: DISCONTINUED | OUTPATIENT
Start: 2023-11-09 | End: 2023-11-09 | Stop reason: HOSPADM

## 2023-11-09 RX ORDER — TRAMADOL HYDROCHLORIDE 50 MG/1
50 TABLET ORAL EVERY 4 HOURS PRN
Status: DISCONTINUED | OUTPATIENT
Start: 2023-11-09 | End: 2023-11-09 | Stop reason: HOSPADM

## 2023-11-09 RX ORDER — MIDAZOLAM HYDROCHLORIDE 1 MG/ML
INJECTION, SOLUTION INTRAMUSCULAR; INTRAVENOUS
Status: DISCONTINUED | OUTPATIENT
Start: 2023-11-09 | End: 2023-11-09

## 2023-11-09 RX ORDER — ROCURONIUM BROMIDE 10 MG/ML
INJECTION, SOLUTION INTRAVENOUS
Status: DISCONTINUED | OUTPATIENT
Start: 2023-11-09 | End: 2023-11-09

## 2023-11-09 RX ORDER — CELECOXIB 200 MG/1
400 CAPSULE ORAL
Status: COMPLETED | OUTPATIENT
Start: 2023-11-09 | End: 2023-11-09

## 2023-11-09 RX ORDER — VANCOMYCIN HYDROCHLORIDE 1 G/20ML
INJECTION, POWDER, LYOPHILIZED, FOR SOLUTION INTRAVENOUS
Status: DISCONTINUED | OUTPATIENT
Start: 2023-11-09 | End: 2023-11-09 | Stop reason: HOSPADM

## 2023-11-09 RX ORDER — HALOPERIDOL 5 MG/ML
0.5 INJECTION INTRAMUSCULAR EVERY 10 MIN PRN
Status: DISCONTINUED | OUTPATIENT
Start: 2023-11-09 | End: 2023-11-09 | Stop reason: HOSPADM

## 2023-11-09 RX ORDER — ONDANSETRON 2 MG/ML
4 INJECTION INTRAMUSCULAR; INTRAVENOUS DAILY PRN
Status: DISCONTINUED | OUTPATIENT
Start: 2023-11-09 | End: 2023-11-09 | Stop reason: HOSPADM

## 2023-11-09 RX ORDER — ROPIVACAINE/EPI/CLONIDINE/KET 2.46-0.005
SYRINGE (ML) INJECTION ONCE
Status: DISCONTINUED | OUTPATIENT
Start: 2023-11-09 | End: 2023-11-09 | Stop reason: HOSPADM

## 2023-11-09 RX ORDER — FAMOTIDINE 10 MG/ML
INJECTION INTRAVENOUS
Status: DISCONTINUED | OUTPATIENT
Start: 2023-11-09 | End: 2023-11-09

## 2023-11-09 RX ORDER — PROPOFOL 10 MG/ML
VIAL (ML) INTRAVENOUS
Status: DISCONTINUED | OUTPATIENT
Start: 2023-11-09 | End: 2023-11-09

## 2023-11-09 RX ORDER — DEXAMETHASONE SODIUM PHOSPHATE 4 MG/ML
INJECTION, SOLUTION INTRA-ARTICULAR; INTRALESIONAL; INTRAMUSCULAR; INTRAVENOUS; SOFT TISSUE
Status: DISCONTINUED | OUTPATIENT
Start: 2023-11-09 | End: 2023-11-09

## 2023-11-09 RX ORDER — PHENYLEPHRINE HYDROCHLORIDE 10 MG/ML
INJECTION INTRAVENOUS
Status: DISCONTINUED | OUTPATIENT
Start: 2023-11-09 | End: 2023-11-09

## 2023-11-09 RX ORDER — SODIUM CHLORIDE 0.9 % (FLUSH) 0.9 %
10 SYRINGE (ML) INJECTION
Status: DISCONTINUED | OUTPATIENT
Start: 2023-11-09 | End: 2023-11-09 | Stop reason: HOSPADM

## 2023-11-09 RX ORDER — ACETAMINOPHEN 500 MG
1000 TABLET ORAL
Status: COMPLETED | OUTPATIENT
Start: 2023-11-09 | End: 2023-11-09

## 2023-11-09 RX ORDER — KETAMINE HCL IN 0.9 % NACL 50 MG/5 ML
SYRINGE (ML) INTRAVENOUS
Status: DISCONTINUED | OUTPATIENT
Start: 2023-11-09 | End: 2023-11-09

## 2023-11-09 RX ORDER — OXYCODONE HYDROCHLORIDE 5 MG/1
5 TABLET ORAL
Status: DISCONTINUED | OUTPATIENT
Start: 2023-11-09 | End: 2023-11-09 | Stop reason: HOSPADM

## 2023-11-09 RX ORDER — FENTANYL CITRATE 50 UG/ML
25 INJECTION, SOLUTION INTRAMUSCULAR; INTRAVENOUS EVERY 5 MIN PRN
Status: COMPLETED | OUTPATIENT
Start: 2023-11-09 | End: 2023-11-09

## 2023-11-09 RX ORDER — FENTANYL CITRATE 50 UG/ML
INJECTION, SOLUTION INTRAMUSCULAR; INTRAVENOUS
Status: DISCONTINUED | OUTPATIENT
Start: 2023-11-09 | End: 2023-11-09

## 2023-11-09 RX ADMIN — Medication 30 MG: at 09:11

## 2023-11-09 RX ADMIN — FENTANYL CITRATE 25 MCG: 50 INJECTION INTRAMUSCULAR; INTRAVENOUS at 01:11

## 2023-11-09 RX ADMIN — ROCURONIUM BROMIDE 50 MG: 10 INJECTION INTRAVENOUS at 09:11

## 2023-11-09 RX ADMIN — MIDAZOLAM HYDROCHLORIDE 1 MG: 1 INJECTION, SOLUTION INTRAMUSCULAR; INTRAVENOUS at 09:11

## 2023-11-09 RX ADMIN — CEFAZOLIN 2 G: 2 INJECTION, POWDER, FOR SOLUTION INTRAMUSCULAR; INTRAVENOUS at 09:11

## 2023-11-09 RX ADMIN — ROPIVACAINE HYDROCHLORIDE 10 ML: 5 INJECTION EPIDURAL; INFILTRATION; PERINEURAL at 09:11

## 2023-11-09 RX ADMIN — FENTANYL CITRATE 25 MCG: 50 INJECTION INTRAMUSCULAR; INTRAVENOUS at 02:11

## 2023-11-09 RX ADMIN — Medication: at 01:11

## 2023-11-09 RX ADMIN — ACETAMINOPHEN 1000 MG: 500 TABLET ORAL at 08:11

## 2023-11-09 RX ADMIN — SODIUM CHLORIDE: 0.9 INJECTION, SOLUTION INTRAVENOUS at 12:11

## 2023-11-09 RX ADMIN — PROPOFOL 100 MG: 10 INJECTION, EMULSION INTRAVENOUS at 10:11

## 2023-11-09 RX ADMIN — FAMOTIDINE 20 MG: 10 INJECTION, SOLUTION INTRAVENOUS at 09:11

## 2023-11-09 RX ADMIN — TRAMADOL HYDROCHLORIDE 50 MG: 50 TABLET, COATED ORAL at 01:11

## 2023-11-09 RX ADMIN — FENTANYL CITRATE 100 MCG: 50 INJECTION, SOLUTION INTRAMUSCULAR; INTRAVENOUS at 09:11

## 2023-11-09 RX ADMIN — DEXAMETHASONE SODIUM PHOSPHATE 8 MG: 4 INJECTION, SOLUTION INTRAMUSCULAR; INTRAVENOUS at 09:11

## 2023-11-09 RX ADMIN — FENTANYL CITRATE 100 MCG: 50 INJECTION INTRAMUSCULAR; INTRAVENOUS at 09:11

## 2023-11-09 RX ADMIN — CELECOXIB 400 MG: 200 CAPSULE ORAL at 08:11

## 2023-11-09 RX ADMIN — PROPOFOL 200 MG: 10 INJECTION, EMULSION INTRAVENOUS at 09:11

## 2023-11-09 RX ADMIN — Medication 10 MG: at 10:11

## 2023-11-09 RX ADMIN — LIDOCAINE HYDROCHLORIDE 100 MG: 20 INJECTION INTRAVENOUS at 09:11

## 2023-11-09 RX ADMIN — PHENYLEPHRINE HYDROCHLORIDE 100 MCG: 10 INJECTION INTRAVENOUS at 12:11

## 2023-11-09 RX ADMIN — SODIUM CHLORIDE: 0.9 INJECTION, SOLUTION INTRAVENOUS at 08:11

## 2023-11-09 RX ADMIN — Medication 10 MG: at 12:11

## 2023-11-09 RX ADMIN — MIDAZOLAM 2 MG: 1 INJECTION INTRAMUSCULAR; INTRAVENOUS at 09:11

## 2023-11-09 RX ADMIN — IPRATROPIUM BROMIDE AND ALBUTEROL SULFATE 3 ML: .5; 3 SOLUTION RESPIRATORY (INHALATION) at 08:11

## 2023-11-09 NOTE — PLAN OF CARE
Notified Dr. Denis about O2 sats and pt smoking history, JANNETH and challenges maintaining sats after previous procedure.  Anticipating order for pre-op breathing treatment

## 2023-11-09 NOTE — DISCHARGE INSTRUCTIONS
[address], [place]                                                                                          [phone]                   Postoperative Instructions for Knee Surgery                 Your Surgery Included:   Open               Arthroscopic   [] Ligament Repair       [] Diagnostic     [] ACL     [] PCL     [] MCL     [] MQTFL   [x] ALL      [] Synovectomy / Plica Removal [] Meniscal Cartilage Repair / Transplantation      [] Lysis of Adhesions / Manipulation [] Articular Cartilage Repair      [] Interval Release           [] Cartimax       [] OATS   [] MAVIS      [x] Meniscectomy           [] Osteochondral Allograft      [] Meniscal Cartilage Repair  [] Patellar Realignment       [x] Debridement / Chondroplasty         [] Lateral Release   [] Ligament Repair      [] Articular Cartilage Repair          [] Extensor Mechanism             []   Microfracture  []  OATS         []  Cartilage Biopsy [] Tendon Repair          [x] Ligament Reconstruction          [] Patella                  [] Quadriceps             [x]   ACL    []   PCL  [] High Tibial Osteotomy       [] Subchondroplasty  [] Joint Replacement         [] Loose Body Removal           [] Unicompartmental   [] Patellofemoral       [] Distal Femoral Osteotomy                 Call our office ([phone]) immediately or message through [facility] if you experience any of the following:       Excessive bleeding or pus like drainage at the incision site       Uncontrollable pain not relieved by pain medication       Excessive swelling or redness at the incision site       Fever above 101.5 degrees not controlled with Tylenol or Motrin       Shortness of Breath or severe calf pain       Any foul odor or blistering from the surgery site    FOR EMERGENCIES: [facility] is the best way to contact us. If on the weekend, page the  at [phone] who will direct your call appropriately. If your procedure is a  total joint replacement, please call the number on your wristband.    1.   Multimodal Pain Management: A cold therapy cuff, pain medications, anti-inflammatories, local injections, TENs unit, and in some cases, regional anesthesia injections are used to manage your post-operative pain. The decision to use each of these options is based on their risks and benefits.     Medications: You were given one or more of the following medication prescriptions during your preoperative appointment. Follow the instructions on the bottles.     Narcotic Medication (usually Percocet, Roxicodone, or Norco): Begin taking the medication before your knee starts to hurt. Some patients do not like to take any medication, but if you wait until your pain is severe before taking, you will be very uncomfortable for several hours waiting for the narcotic to work. Always take with food.     Nausea / Vomiting: For this issue, we prescribe Phenergan or Zofran, use this medication as directed as needed for nausea.     Cold Therapy: You may have been sent home with an ice wrap. The cold can provide short-term pain relief, and limits swelling by reducing blood flow to the injured area. Apply the cold gel pack for 20 minutes and then take it off for 20 minutes. Use throughout the day, as needed to help relieve pain and control swelling.     Regional Anesthesia Injections (Blocks): You may have been given a regional nerve block/catheter either before or after surgery. This may make your entire leg numb for 2-5 days.                           * Proceed with caution when bearing weight on your leg.     2.   Diet: Eat a bland diet for the first day after surgery. Progress your diet as tolerated. Constipation may occur with Narcotic usage. We recommend Colace 100mg twice a day while taking narcotics.    3.   Activity: Limit your activity during the first 48 hours, keep your leg elevated with pillows under your heel. After the first 48 hours at home,  increase your activity level based on your symptoms.    4.   Dressing: (a) The soft, bulky dressing will be removed on the 3rd day after surgery. Place waterproof bandages at this time. Keep wounds as dry as possible for first 2 weeks. It is normal for some blood to be seen on the dressings. It is also normal for you to see apparent bruising on the skin around your incisions. If you are concerned by the drainage or the appearance of your wound site, you can send a picture via [facility].    5.   Shower: (a) You may shower on the 3rd day after surgery. Place waterproof bandages prior to shower. It is recommended to use Saran wrap before showering. Do not submerge limb for 4 weeks or incisions completely healed in any water.     6.   Knee Brace: You may have been sent home in a hinged knee brace. Your brace is set at 0 to 30 degrees of motion. Wear the brace for 2 weeks, LOCKED in full extension when walking, you will need to wear this brace at all time unless instructed otherwise. You may unlock at rest or for exercises.    7.   Your procedure did not require a Continuous Passive Motion (CPM) device.    8.   Weight Bearing: You may have been sent home with crutches. If instructed (see below), use these crutches at all times unless at complete rest.                  [x] Full weight bearing            [x]  NOW        9.  Knee Exercises: Begin these exercises the first day after surgery in order to help you regain your motion and strength. You may do the following marked exercises:     [x] Quad Sets - Begin activating your quadriceps muscle by driving your          knee downward into full knee extension while seated on a table or bed   with a towel rolled and propped under your heel     [x] Straight Leg Raise (SLR) - While octavia your quadriceps muscle, lift     your fully extended leg to the level of your non-operative knee (as shown)          [x] Ankle pumps - With your knee straight, move your ankle in a  ""pumping"    fashion to activate your calf and leg muscles      10.  Physical Therapy: Physical therapy is an essential component to your recovery from surgery. Your physical therapy will start in 1 weeks.    FIRST POSTOPERATIVE VISIT: As scheduled.      "

## 2023-11-09 NOTE — ANESTHESIA PROCEDURE NOTES
Right adductor canal catheter    Patient location during procedure: pre-op   Block not for primary anesthetic.  Reason for block: at surgeon's request and post-op pain management   Post-op Pain Location: Right knee pain   Start time: 11/9/2023 9:21 AM  Timeout: 11/9/2023 9:20 AM   End time: 11/9/2023 9:35 AM    Staffing  Authorizing Provider: Valarie Denis MD  Performing Provider: Valarie Denis MD    Staffing  Performed by: Valarie Denis MD  Authorized by: Valarie Denis MD    Preanesthetic Checklist  Completed: patient identified, IV checked, site marked, risks and benefits discussed, surgical consent, monitors and equipment checked, pre-op evaluation and timeout performed  Peripheral Block  Patient position: supine  Prep: ChloraPrep and site prepped and draped  Patient monitoring: heart rate, cardiac monitor, continuous pulse ox, continuous capnometry and frequent blood pressure checks  Block type: adductor canal  Laterality: right  Injection technique: continuous  Needle  Needle type: Tuohy   Needle gauge: 17 G  Needle length: 3.5 in  Needle localization: anatomical landmarks and ultrasound guidance  Catheter type: spring wound  Catheter size: 19 G  Test dose: lidocaine 1.5% with Epi 1-to-200,000 and negative   -ultrasound image captured on disc.  Assessment  Injection assessment: negative aspiration, negative parasthesia and local visualized surrounding nerve  Paresthesia pain: none  Heart rate change: no  Slow fractionated injection: yes  Pain Tolerance: comfortable throughout block and no complaints  Medications:    Medications: ropivacaine (NAROPIN) injection 0.5% - Perineural   10 mL - 11/9/2023 9:25:00 AM    Additional Notes  VSS.  [facility] RN monitoring vitals throughout procedure.  Patient tolerated procedure well.     With 10mL normal saline, 1:200,000 epi

## 2023-11-09 NOTE — TRANSFER OF CARE
"Anesthesia Transfer of Care Note    Patient: Fredy Bo    Procedure(s) Performed: Procedure(s) (LRB):  RECONSTRUCTION, KNEE, ACL, ARTHROSCOPIC (Right)  ALL RECONSTRUCTION (Right)  ARTHROSCOPY, KNEE, WITH CHONDROPLASTY (Right)  LYSIS, ADHESIONS, KNEE, ARTHROSCOPIC (Right)  ARTHROSCOPY, KNEE, WITH MENISCECTOMY (Right)    Patient location: PACU    Anesthesia Type: general    Transport from OR: Transported from OR on 6-10 L/min O2 by face mask with adequate spontaneous ventilation    Post pain: adequate analgesia    Post assessment: no apparent anesthetic complications and tolerated procedure well    Post vital signs: stable    Level of consciousness: awake, alert and oriented    Nausea/Vomiting: no nausea/vomiting    Complications: none    Transfer of care protocol was followed      Last vitals:   Visit Vitals  /61 (BP Location: Right arm, Patient Position: Lying)   Pulse 78   Temp 36.6 °C (97.8 °F) (Temporal)   Resp (!) 21   Ht 5' 10" (1.778 m)   Wt 108.9 kg (240 lb)   SpO2 96%   BMI 34.44 kg/m²     "

## 2023-11-09 NOTE — BRIEF OP NOTE
OUTPATIENT ORTHOPAEDIC SURGERY    Brief Operative Note       Surgery Date: 11/9/2023     Pre-op Diagnosis:  Old complex tear of lateral meniscus of right knee [M23.200]  Rupture of anterior cruciate ligament of right knee, sequela [S83.511S]  Internal derangement of left knee [M23.92]    Post-op Diagnosis: Old complex tear of lateral meniscus of right knee [M23.200]  Rupture of anterior cruciate ligament of right knee, sequela [S83.511S]  Internal derangement of left knee [M23.92]    Procedures: Procedure(s) (LRB):  RECONSTRUCTION, KNEE, ACL, ARTHROSCOPIC (Right)  ALL RECONSTRUCTION (Right)  ARTHROSCOPY, KNEE, WITH CHONDROPLASTY (Right)  LYSIS, ADHESIONS, KNEE, ARTHROSCOPIC (Right)  ARTHROSCOPY, KNEE, WITH MENISCECTOMY (Right)    Surgeon: Surgeon(s) and Role:     * Ryland Coffey MD - Primary    Anesthesia: General    Findings/Key Components: ruptured prior ACL repair, complex tear posterior horn medial meniscus, outerbridge 3 changes trochlea, medial femoral condyle     Core Measure Documentation:  Were antibiotics extended? No  Was the patient administered a VTE Prophylaxis? No. Short procedure; low risk  Estimated Blood Loss: minimal           Specimens (From admission, onward)      None            Implants:   Implant Name Type Inv. Item Serial No.  Lot No. LRB No. Used Action   LOOP LIZETTE RIGID FIX ADJ STD - [id number]  LOOP LIZETTE RIGID FIX ADJ STD  [employer] [id number] Right 1 Implanted   [employer]     [id number] Right 1 Implanted   SYS SWIVELOCK ANCH 4.75X19.1MM - [id number]  SYS SWIVELOCK ANCH 4.75X19.1MM  [employer] [id number] Right 1 Implanted   BI[employer] KNOTLESS SWIVELOCK ANCHOR    [employer] [id number] Right 1 Implanted   SCREW BIOINTRAFIX [id number] - [id number]  SCREW BIOINTRAFIX [id number]  [employer] [id number] Right 1 Implanted   SHEATH SCREW LARGE 30MM - [id number]  SHEATH SCREW LARGE 30MM  [employer] [id number] Right 1 Implanted   [id number]  TENDON ANT TIBIALIS >20X>6MM  [id number]  Right 1 Implanted       Complications: none           Disposition: PACU - hemodynamically stable.           Condition: Stable    Attestation:  I was present for the entire procedure.    Discharge Note      SUMMARY     Admit Date: 11/9/2023    Attending Physician: Ryland Coffey MD     Discharge Date: 11/09/2023    Final Diagnosis: please see operative report    Hospital Course of Care: Patient underwent elective surgery surgery and was transferred to PACU in stable condition.  In PACU, patient received appropriate post-operative care and was transferred to medical floor for neurovascular monitoring and pain control.  There patient progressed appropriately. Once met anesthesia derived discharge criteria, the patient was discharged home with plans for physical therapy and follow-up with the operative surgeon.    Disposition: Home or Self Care    Patient Instructions:   Current Discharge Medication List        CONTINUE these medications which have NOT CHANGED    Details   buPROPion (WELLBUTRIN) 100 MG tablet Take 20 mg by mouth once daily.      fluticasone (FLONASE) 50 mcg/actuation nasal spray 1 spray by Each Nare route once daily.      losartan (COZAAR) 25 MG tablet Take 1 tablet (25 mg total) by mouth once daily.  Qty: 30 tablet, Refills: 6      aspirin (ECOTRIN) 325 MG EC tablet Take 1 tablet (325 mg total) by mouth once daily.  Qty: 42 tablet, Refills: 0    Associated Diagnoses: Rupture of anterior cruciate ligament of right knee, subsequent encounter; Other old tear of lateral meniscus of right knee; Knee internal derangement, right      celecoxib (CELEBREX) 200 MG capsule Take 1 capsule (200 mg total) by mouth 2 (two) times daily.  Qty: 60 capsule, Refills: 2    Associated Diagnoses: Rupture of anterior cruciate ligament of right knee, subsequent encounter; Other old tear of lateral meniscus of right knee; Knee internal derangement, right      clotrimazole-betamethasone 1-0.05% (LOTRISONE)  cream Apply topically 2 (two) times daily.  Qty: 45 g, Refills: 0      !! HYDROcodone-acetaminophen (NORCO)  mg per tablet Take 1 tablet by mouth every 6 (six) hours as needed for Pain.  Qty: 50 tablet, Refills: 0      !! HYDROcodone-acetaminophen (NORCO)  mg per tablet Take 1 tablet by mouth every 6 (six) hours as needed for Pain.  Qty: 28 tablet, Refills: 0    Comments: Quantity prescribed more than 7 day supply? No  Associated Diagnoses: Rupture of anterior cruciate ligament of right knee, subsequent encounter; Other old tear of lateral meniscus of right knee; Knee internal derangement, right      ibuprofen (ADVIL,MOTRIN) 600 MG tablet Take 600 mg by mouth 3 (three) times daily.      methocarbamoL (ROBAXIN) 500 MG Tab Take 1 tablet (500 mg total) by mouth 3 (three) times daily. for 10 days  Qty: 30 tablet, Refills: 0    Associated Diagnoses: Rupture of anterior cruciate ligament of right knee, subsequent encounter; Other old tear of lateral meniscus of right knee; Knee internal derangement, right      pregabalin (LYRICA) 75 MG capsule Take 1 capsule (75 mg total) by mouth 2 (two) times daily.  Qty: 60 capsule, Refills: 1    Associated Diagnoses: Rupture of anterior cruciate ligament of right knee, subsequent encounter; Other old tear of lateral meniscus of right knee; Knee internal derangement, right      !! promethazine (PHENERGAN) 25 MG tablet Take 1 tablet (25 mg total) by mouth every 6 (six) hours as needed for Nausea.  Qty: 20 tablet, Refills: 0      !! promethazine (PHENERGAN) 25 MG tablet Take 1 tablet (25 mg total) by mouth every 6 (six) hours as needed for Nausea.  Qty: 30 tablet, Refills: 0    Associated Diagnoses: Rupture of anterior cruciate ligament of right knee, subsequent encounter; Other old tear of lateral meniscus of right knee; Knee internal derangement, right      sulfamethoxazole-trimethoprim 800-160mg (BACTRIM DS) 800-160 mg Tab Take 1 tablet by mouth 2 (two) times daily.  Qty:  20 tablet, Refills: 0       !! - Potential duplicate medications found. Please discuss with provider.          Discharge Procedure Orders (must include Diet, Follow-up, Activity)  No discharge procedures on file.     Activity: Per printed postoperative instructions.     Diet: Resume pre-surgery diet.    Follow-up: 2 weeks with Dr. Coffey

## 2023-11-09 NOTE — PLAN OF CARE
VS stable. Pt states pain is tolerable for discharge. Dressing CDI. Medications received bedside delivery. Pt states already has  crutches for home use. Patient belongings at bedside. Pt states they are ready for discharge. Dc instructions given and reviewed with patient/family. Pt verbalized understanding and all questions answered. Pt discharged to home with  wife.

## 2023-11-09 NOTE — OP NOTE
[facility] ([facility])  Operative Note      Date of Procedure: 11/9/2023     Procedure: 1. Right  Arthroscopy, anterior cruciate ligament reconstruction 93603  2. Right   16578 Ligament knee reconstruction, extra-articular  2.  Right  Arthroscopy, with meniscectomy (medial OR lateral) 23207, medial  3.  Right  Arthroscopy, debridement/shaving of articular cartilage (chondroplasty) 96726  4.  Right  Arthroscopy, with lysis of adhesions 60880  5.  Right  Arthroscopy, knee, synovectomy, limited 17206    Surgeon(s) and Role:     * Ryland Coffey MD - Primary     Assisting Surgeon:     MD Indira Sexton SMA    It was medically necessary for Emeterio Spangler MD to perform first assistant duties aiding in setup, exposure and closure.    Pre-Operative Diagnosis: Old complex tear of lateral meniscus of right knee [M23.200]  Rupture of anterior cruciate ligament of right knee, sequela [S83.511S]  Internal derangement of left knee [M23.92]    Post-Operative Diagnosis: Post-Op Diagnosis Codes:     * Old complex tear of lateral meniscus of right knee [M23.200]     * Rupture of anterior cruciate ligament of right knee, sequela [S83.511S]     * Internal derangement of left knee [M23.92]    Anesthesia: General    Operative Findings (including complications, if any): ACL tear with retained suture, chondromalacia and medial meniscal tear    Description of Technical Procedures:           Expand All Collapse All   DATE OF PROCEDURE: 11/9/2023    ATTENDING SURGEON: Surgeon(s) and Role:     * Ryland Coffey MD - Primary    Assistants:  MD Indira Sexton SMA    It was medically necessary for Emeterio Spangler MD to perform first assistant duties aiding in setup, exposure and closure.       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 Right  Arthroscopy, anterior cruciate ligament reconstruction 21322  2.  Right  Arthroscopy, with meniscectomy (medial OR lateral) 94879, medial  3.  Right  Arthroscopy, debridement/shaving of articular cartilage (chondroplasty) 43380  4.  Right  Arthroscopy, with lysis of adhesions 55420  5.  Right  Arthroscopy, knee, synovectomy, limited 96066    ANESTHESIA: General LMA anesthesia and Adductor block with catheter,   Local anesthetic:  30 ml 0.5% ropivicaine mixture        FLUIDS IN THE CASE: 1000 ml    ESTIMATED BLOOD LOSS: Minimal    URINE OUTPUT: 0 ml    COMPLICATIONS: none    CONDITION ON RETURN TO RECOVERY ROOM: Good    IMPLANTS UTILIZED:  [employer] equipment  Mitek rigid loop adjustable cortical implant standard x 1  Mitek Bio-Intrafix 8-10 x 30 mm tapered screw  Mitek Bio-Intrafix Large tibial sheath    GRAFT SOURCE:  Hamstring auto graft      Size 10 x 120 mm graft    Biobrace 0.5 x 240mm      Findings:     ARTICULAR CARTILAGE LESION(S):  Medial Femoral Condyle: ICRS Grade 3      Size: 2.0 x 2.0 cm  Medial tibial plateau: ICRS Grade 0      Size: none        Lateral Femoral Condyle: ICRS Grade 0      Size: none  Lateral tibial plateau: ICRS Grade 0      Size: none        Patellar surface: ICRS Grade 4A      Size: 1.0 x 1.5 cm  Trochlear groove: ICRS Grade 4A      Size: 3.0 x 3.0 cm      EXAMINATION UNDER ANESTHESIA:   Extension 5 degrees  Flexion 140 degrees  Lachman Maneuver:  3B  Anterior Drawer: >10 mm  Pivot Shift: Positive  Posterior Drawer:  Negative  Varus stability @ 30 degrees: 0  Valgus stability @ 30 degrees: 0  Patellar glide:1 quadrant lateral, 2 quadrant medial      Meniscal status:  Medial meniscus:   complex tear  Tear location:  Central and posterior body    Lateral  meniscus:   Intact  Tear location:  none    IINDICATIONS FOR OPERATIVE PROCEDURE:  Fredy Bo is a 53 y.o.  male with history of right knee pain and pathology. The patient's history and physical examination findings consistent with the procedure performed. He noted significant problems in the area of concern with problems on activities of daily living and aggressive use of the right leg. As a result of these problems and problems with overall activity level, the patient was deemed to be an appropriate candidate for operative intervention. Nonoperative versus operative options were discussed. The risks and benefits were discussed with the patient. The patient acknowledged understanding and wished to proceed with operative intervention. Informed consent was obtained prior to the procedure. Details of the surgical procedure were explained, including incisions and probable rehabilitation course. The patient understands the likely length of convalescence after surgery; and we have explained the risks, benefits, and alternatives of surgery. Reasonable expectations and potential complications were discussed and acknowledged, including but not limited to infection, bleeding, blood clots, (DVT and/or PE), nerve injury, retear, instability, continued pain and stiffness. It was also explained that there was a chance of failure which may require further management. The patient agreed and understood and wished to proceed.     DESCRIPTION OF PROCEDURE: The patient was brought into the Operating Room,   placed in supine position. Upon application of general anesthetic as well as   placement of LMA to stabilize the airway, the patient was given the appropriate   dose of antibiotics based on body weight. Time-out was utilized to verify right  side as the operative site. Both upper extremities were placed in comfortable   position. Left leg was carefully padded along the heel and regions. Right leg   was then raised and  tourniquet was applied proximally with the lateral post and   popliteal post along the right side of the table. The right leg was then   prepped and draped in a sterile fashion with ChloraPrep material.     The right legwas then examined under anesthesia with the above stated findings.  Knee was taken into flexion and an anteromedial based incision was carried down   through the skin down the fat and fascia. Layer 1 was cut in an inverted   L-shaped fashion. Grafts were obtained off the undersurface of layer 1. Grafts  were then oversewn with #2 Orthocord suture limbs placed in modified Krackow   fashion within the free end of hamstring tendons. A tendon stripper was used to  obtain each graft. Muscle was removed from the proximal belly of each graft.   Grafts were then oversewed with additional #2 Orthocord sutures placed in   modified Krackow fashion with the free ends of each area of concern. Sutures   were then sized demonstrating a diameters of 6.5 for the semitendinosus graft   and 6 mm for the gracilis graft and loop. Combined diameter was 8.5 mm. Grafts  were then tensioned to 15 pounds of tension using the Mitek adjustable loop   device and 2-0 Vicryl pursestring sutures were placed in the looped portion of   each graft to maintain equal tension the two limbs of the graft. Grafts were   then saved in moist lap pads for later use in the case.    Attention was turned to the arthroscopic portion of the procedure. Knee was   taken to flexion 90 degrees. We instilled 10 mL of the ANAND mixture into the  anterolateral and anteromedial aspect medial aspects of the knee. A #11 blade   was used to make these portals. Blunt trocar and sheath were inserted into the intercondylar notch and then subsequently into the suprapatellar pouch. This patellofemoral joint was visualized, demonstrating normal lateral patellar tilt, normal patellar subluxation. The patellar tracked midline with flexion and extension. Arthroscopic  "pictures were obtained. There was articular cartilage damage was present in the patellofemoral compartment as noted in the Findings section of this operative report. The lesion if present was treated with arthroscopic chondroplasty technique removing all irregular edges and flaps of articular cartilage at the lesion.      Attention was turned to the intercondylar notch where the anterior cruciate ligament (ACL) and posterior cruciate ligament (PCL) structures were visualized. Visualization demonstrated complete tearing of the ACL with an intact PCL. noted   Adhesions and scar was removed from the intercondylar notch region with the IMayGou Vapr probe. Old suture material was removed from this region as well with use of a grasper, shaver and bur. A limited synovectomy was performed along the intercondylar notch region and the anterior medial aspect of the knee.    Attention was then turned to the lateral compartment. The patient demonstrated an intact lateral meniscus with probe analysis demonstrating no occult tears or pathology Arthroscopic instrumentation was used to veify no tear and pictures obtained. There was no articular cartilage damage in the lateral compartment The lesion if present was treated with  observation.    Attention was then turned to the medial compartment. The patient demonstrated a complex type tear of the central and posterior body region of the medial meniscus." Arthroscopic instrumentation was used to carefully remove the tear and removing 50 % of the central and posterior body region of the medial meniscus. The remaining meniscus structure medially was found to be intact. There was articular cartilage damage was present in the medial compartment as noted in the Findings section of this operative report. The lesion if present was treated with arthroscopic chondroplasty technique removing all irregular edges and flaps of articular cartilage at the lesion.    Pictures were obtained. Knee was " then taken into hyperflexion. We removed scar  from the anterolateral aspect of the intercondylar notch. Knee was then taken   into hyperflexion and we placed a flexible guidepin of the anterolateral aspect   of the thigh. This area was then overdrilled with a 4.5 flexible reaming device  demonstrating a tunnel length of 35 mm. We then overdrilled with a 10 mm   flexible reaming device to a depth of 30 mm. The wire was then used to place a   passing suture for later passes of the graft looped portion maintained distally   and saved along the anterior aspect of the thigh with a stat. The final debridement of all bony fragments was performed along the area of concern.     Knee was then taken to flexion 90 degrees. With the arthroscope   in the anterolateral portal, a guide was placed in the anteromedial portal and   the stump of the ACL remnant. The bullet portion was placed directly along the   anteromedial aspect of the tibia through the previously created anteromedial   incision into the stump of the ACL. Previous suture and scar was removed and debrided from this region using a rongeur and bovie cautery.    Guidepin was left in place. It was overdrilled with a 9.0 mm drill bit and then dilated up to 10 mm in 0.5 mm increments. Final debridement of all inflamed tissues was performed along the area of concern. As a specimen all synovitic tissue along the anterior aspect of knee with a 4.2 shaver as well as use of a Mitek VAPR probe.  A crab claw device was used to place all passing sutures distally into the   tibial tunnel. The grafts were then passed passing the anteromedial bundle   first. The rigid loop device was then flipped and using sequential suture   technique. Sutures were used to pull the graft into the femoral tunnel. The   grafts fit in excellent fashion. We then repeated this process for the   posterolateral bundle. Grafts were then once again tensioned appropriately with  knee was taken through 20  cycles. Final pictures were obtained demonstrating   no signs of graft impingement. Final debridement of all inflamed tissues was   performed with a 4.2 shaver.      Arthroscopic anterior interval release was performed using the arthroscopic heat probe through both anteromedial and anterolateral portals while visualizing through the alternate portal.     Anterior longitudinal ligament reconstruction (extra-articular ligament reconstruction):   Separate 2 cm incision was created at the lateral epicondylar eminence.  Bovie cautery was used to complete dissection down to the bone level.  A pin from the Arthrex system was placed from lateral to medial cross region concern our retrograde fashion. It was then drilled to a depth of 25 mm with the Arthrex drill bit for the internal brace system.  The area was tapped.  Distal dissection performed simultaneously at a separate 2 cm incision at the area of Gerdy's tubercle. Bovie cautery was used to dissect down to the bony level. There was applied. A   depth of 25 mm was drilled in this area in preparation for use of the SwiveLock device distally.    The tibialis anterior allograft was prepared with the internal brace system attached to it and application of the concomitant proximal fixation device.  The total diameter of the graft and internal brace system was approximately 6.5 mm.  Length with approximately 120 cm looped.  The graft was taken to the.  Proximal region.  Sections performed to remove all soft tissue structures with quite careful exposure a stat was used placed the graft roughly in into the bony region.  The proximal internal brace system was applied deployed with excellent stability achieved. The luggage tag stitch was removed was removed. The distal exposed region was then used and a stat was placed directly into the soft tissue structures at the lateral retinacular region. This same stat was used to place a passing suture proximally the loop portion  maintained proximally. This proximal passing suture was used to pass the graft and internal brace associated with the graft distally into the distal incision. The SwiveLock device was used deployed the graft and internal brace directly into the prepared Gerdy's tubercle region with the leg in flexion 45° and internal rotation applied. We placed a fiberloop suture at the appropriate location along the TA graft based on the best site for fixation with the distal Swivelock device; Excellent stability of this area was achieved.  Graft distally was removed with a 15 blade. Skin and subcutaneous tissue close to sutures placed in far-near near-far fashion. Xeroform was placed over these incisional regions.  Note:  Placement of the final distal fixation for the ALL ligament reconstruction was performed with the knee in 45° flexion prior to placement of the distal fixation for the anterior cruciate ligament reconstructed portion of procedure.     Arthroscopic instrumentation was removed from the knee. We then took the knee through 20 cycles to remove any creep in the ligament. Knee was taken at 30   degrees flexion. Distal tension was applied with an Intrafix tensioning device   to 20 pounds of tension. Tibial tunnel was then dilated. Large sheath was   applied and 8-10 x 30 mm screw was applied. Excellent fixation was achieved in   the area of concern. We then visualized the joint arthroscopically once again   demonstrating no significant additional tension requiring stenting within the   knee. As a result, the sutures peripherally along the region leading device   were then cut clean with the Mitek cord cutting device and the passing suture   was removed in standard fashion. Irrigation performed copiously along the area   of concern. Fluid was extravasated from all areas. Knee was taken into 10   degrees flexion. Layer 1 was closed in an inverted L-shaped fashion.     At this point, we closed layer 1 with a series of #1  Vicryl placed   in figure-of-eight fashion. Subcutaneous tissues were closed with 3-0 Vicryls   placed in inverted fashion. Skin was closed with running 4-0 Monocryl sutures   placed in subcuticular fashion along with application of Dermabond and tape.   Arthroscopic portals were closed with 4-0 nylon sutures. We then injected additional 0.5% ropivicaine mixture using 10 ml per portal sites and along the soft tissue regions for additional pain control postoperatively.     Xeroform was applied along with application of sterile electrodes proximally and distally, gauze, ABD pads,cast padding, long-leg LYNDSEY hose stocking and cooling unit. The patient's knee was placed into a hinged immobilizer, which was locked in -10 degrees extension and allowed the flexion to 120 degrees. The patient was then allowed to recover from anesthesia.  General was removed. The patient was taken to Recovery Room in  Good  condition. At the completion case, all instrument and sponge counts were   correct.    NOTE: I was present and scrubbed for the key portions of the procedure.    PHYSICAL THERAPY:  The patient should begin physical therapy on postoperative   day #1-3 and will be advanced to outpatient therapy as soon as   Possible following discharge.    Weight bearing:as tolerated  right leg  Range of Motion:Full normal motion symmetric to opposite side    Immediate specific exercises should include:   Gait program should include the above stated weightbearing; in addition: active extension with a heel-to-toe gait working on maintaining extension    Immobilizer if present should be locked @-10 degrees with gait and allowed to flex to 120 degrees at rest.     Discharge summary:  The patient was discharged to home in Good     Medication(s): Refer to Discharge Medication List    Follow-up as scheduled  Activity as above.      the CPM machine as much as possible and to begin quad sets with a heel roll to obtain hyperextension, straight leg  raise and heel slides with the heel supported in a closed-chain fashion    Immediate specific exercises should include:   Gait program should include the above stated weightbearing; in  Medication(s): Refer to Discharge Medication List     Discharge home when stable  Follow-up as scheduled  Activity per instruction sheet  Condition Stable       Expand All Collapse All                            Significant Surgical Tasks Conducted by the Assistant(s), if Applicable: preparation and closure    Estimated Blood Loss (EBL): * No values recorded between 11/9/2023 10:05 AM and 11/9/2023 12:55 PM *           Implants:   Implant Name Type Inv. Item Serial No.  Lot No. LRB No. Used Action   LOOP LIZETTE RIGID FIX ADJ STD - [id number]  LOOP LIZETTE RIGID FIX ADJ STD  [employer] [id number] Right 1 Implanted   [employer] biobrace     [id number] Right 1 Implanted   SYS SWIVELOCK ANCH 4.75X19.1MM - [id number]  SYS SWIVELOCK ANCH 4.75X19.1MM  [employer] [id number] Right 1 Implanted   BI[employer] KNOTLESS SWIVELOCK ANCHOR    [employer] [id number] Right 1 Implanted   SCREW BIOINTRAFIX [id number] - [id number]  SCREW BIOINTRAFIX [id number]  [employer] [id number] Right 1 Implanted   SHEATH SCREW LARGE 30MM - [id number]  SHEATH SCREW LARGE 30MM  [employer] [id number] Right 1 Implanted   [id number]  TENDON ANT TIBIALIS >20X>6MM [id number]  Right 1 Implanted       Specimens:   Specimen (24h ago, onward)      None                    Condition: Good    Disposition: PACU - hemodynamically stable.    Attestation: I was present and scrubbed for the key portions of the procedure.    Discharge Note    OUTCOME: Patient tolerated treatment/procedure well without complication and is now ready for discharge.    DISPOSITION: Home or Self Care    FINAL DIAGNOSIS:  <principal problem not specified>    FOLLOWUP: In clinic    DISCHARGE INSTRUCTIONS:    Discharge Procedure Orders   Diet Adult Regular     Ice to affected area     Keep surgical  extremity elevated     Notify your health care provider if you experience any of the following:  temperature >100.4     Notify your health care provider if you experience any of the following:  persistent nausea and vomiting or diarrhea     Notify your health care provider if you experience any of the following:  severe uncontrolled pain     Notify your health care provider if you experience any of the following:  redness, tenderness, or signs of infection (pain, swelling, redness, odor or green/yellow discharge around incision site)     Activity as tolerated

## 2023-11-09 NOTE — PLAN OF CARE
Patient prepped for procedure.  POC reviewed with pt and his wife who verbalized understanding.    Outpatient pharmacy confirmed.  Patient will need crutches at discharge.    All belongings to remain in locker 9 during procedure. CPAP with RT Shahram

## 2023-11-09 NOTE — ANESTHESIA PREPROCEDURE EVALUATION
11/09/2023  Fredy Taylor is a 53 y.o., male.    Procedure Summary    Case: [id number] Date/Time: 11/09/23 1005   Procedures:        RECONSTRUCTION, KNEE, ACL, ARTHROSCOPIC (Right: Knee) - general, regional w catheter, adductor, jj 50cc       ALL RECONSTRUCTION (Right)       ARTHROSCOPY,KNEE,WITH MENISCUS REPAIR (Right: Knee)       ARTHROSCOPY, KNEE, WITH CHONDROPLASTY (Right: Knee)       LYSIS, ADHESIONS, KNEE, ARTHROSCOPIC (Right)   Anesthesia type: General   Diagnosis:        Old complex tear of lateral meniscus of right knee [M23.200]       Rupture of anterior cruciate ligament of right knee, sequela [S83.511S]      Patient Active Problem List   Diagnosis    Depressive disorder, not elsewhere classified    Rhinitis, allergic    Rupture of ulnar collateral ligament of thumb    Left hand pain    Chronic pain of right knee    Acute internal derangement of right knee    Rupture of anterior cruciate ligament of right knee    ACL tear    Right knee pain     Past Surgical History:   Procedure Laterality Date    ARTHROSCOPIC REPAIR OF ANTERIOR CRUCIATE LIGAMENT Right 7/10/2018    Procedure: REPAIR, KNEE, ACL, ARTHROSCOPIC;  Surgeon: Ryland Coffey MD;  Location: [facility];  Service: Orthopedics;  Laterality: Right;  regional without catheter adductor; Clonidine/Epi/Keterolac/Ropivacaine inj 30cc    CHONDROPLASTY Right 7/10/2018    Procedure: CHONDROPLASTY;  Surgeon: Ryland Coffey MD;  Location: [facility];  Service: Orthopedics;  Laterality: Right;    COLONOSCOPY N/A 5/16/2022    Procedure: COLONOSCOPY;  Surgeon: Inez Loredo MD;  Location: [facility] ([address] [place] [address]);  Service: Colon and Rectal;  Laterality: N/A;  order created from outside referral  Fully Vaccinated, prep instr mailed -ml    INJECTION OF STEROID Right 7/10/2018    Procedure: INJECTION, STEROID; amniox right knee;  Surgeon: Ryland TERRELL  MD Alexx;  Location: [facility];  Service: Orthopedics;  Laterality: Right;    KNEE SURGERY      SYNOVECTOMY Right 7/10/2018    Procedure: SYNOVECTOMY;  Surgeon: Ryland Coffey MD;  Location: [facility];  Service: Orthopedics;  Laterality: Right;    TENDON REPAIR Left     thumb    VASECTOMY       Medication List with Changes/Refills   Current Medications    ASPIRIN (ECOTRIN) 325 MG EC TABLET    Take 1 tablet (325 mg total) by mouth once daily.    BUPROPION (WELLBUTRIN) 100 MG TABLET    Take 20 mg by mouth once daily.    CELECOXIB (CELEBREX) 200 MG CAPSULE    Take 1 capsule (200 mg total) by mouth 2 (two) times daily.    CLOTRIMAZOLE-BETAMETHASONE 1-0.05% (LOTRISONE) CREAM    Apply topically 2 (two) times daily.    FLUTICASONE (FLONASE) 50 MCG/ACTUATION NASAL SPRAY    1 spray by Each Nare route once daily.    HYDROCODONE-ACETAMINOPHEN (NORCO)  MG PER TABLET    Take 1 tablet by mouth every 6 (six) hours as needed for Pain.    HYDROCODONE-ACETAMINOPHEN (NORCO)  MG PER TABLET    Take 1 tablet by mouth every 6 (six) hours as needed for Pain.    IBUPROFEN (ADVIL,MOTRIN) 600 MG TABLET    Take 600 mg by mouth 3 (three) times daily.    LOSARTAN (COZAAR) 25 MG TABLET    Take 1 tablet (25 mg total) by mouth once daily.    METHOCARBAMOL (ROBAXIN) 500 MG TAB    Take 1 tablet (500 mg total) by mouth 3 (three) times daily. for 10 days    PREGABALIN (LYRICA) 75 MG CAPSULE    Take 1 capsule (75 mg total) by mouth 2 (two) times daily.    PROMETHAZINE (PHENERGAN) 25 MG TABLET    Take 1 tablet (25 mg total) by mouth every 6 (six) hours as needed for Nausea.    PROMETHAZINE (PHENERGAN) 25 MG TABLET    Take 1 tablet (25 mg total) by mouth every 6 (six) hours as needed for Nausea.    SULFAMETHOXAZOLE-TRIMETHOPRIM 800-160MG (BACTRIM DS) 800-160 MG TAB    Take 1 tablet by mouth 2 (two) times daily.       Pre-op Assessment    I have reviewed the Patient Summary Reports.     I have reviewed the Nursing Notes. I have reviewed the  NPO Status.   I have reviewed the Medications.     Review of Systems  Anesthesia Hx:  No problems with previous Anesthesia  History of prior surgery of interest to airway management or planning: Previous anesthesia: General, MAC, Nerve Block 7/10/2018  Right Knee Arthroscopy, ACL Repair, Chondroplasty with general anesthesia.  Procedure performed at an [facility]. for 7/10/2018 Right Knee Arthroscopy, ACL Repair, Chondroplasty.  Procedure performed at an [facility].  5/16/2022  Colonoscopy with MAC.  Procedure performed at an [facility]. Airway issues documented on chart review include mask, easy, laryngeal mask airway used  Denies Family Hx of Anesthesia complications.   Denies Personal Hx of Anesthesia complications.   Social:  Smoker, Social Alcohol Use Started smoking 2/9/1983, smokes 0.5 ppd for 40 years   Hematology/Oncology:  Hematology Normal   Oncology Normal     EENT/Dental:   chronic allergic rhinitis Hay fever   Cardiovascular:   Exercise tolerance: good Hypertension Denies CAD.     Denies Angina. hyperlipidemia  Denies GUILLERMO.    Pulmonary:   Denies Asthma.  Denies Shortness of breath. Sleep Apnea    Education provided regarding risk of obstructive sleep apnea     Renal/:  Renal/ Normal  Denies Chronic Renal Disease.  Vasectomy   Hepatic/GI:  Hepatic/GI Normal  Denies GERD. Denies Liver Disease.    Musculoskeletal:   Old complex tear of lateral meniscus of right knee,  Rupture of anterior cruciate ligament of right knee, sequela,  Internal derangement of left knee,  H/O Right Knee Arthroscopy, Repair of ACL, Chondroplasty, Synovectomy,   Rupture of ulnar collateral ligament of thumb, S/P Tendon Repair   Neurological:  Neurology Normal  Denies CVA.  Denies Headaches. Denies Seizures.    Endocrine:   Denies Diabetes. Denies Hypothyroidism.  Obesity / BMI > 30  Psych:   depression        Physical Exam    Airway:  Mallampati: II / II  Mouth Opening: Normal  TM Distance: Normal  Tongue:  Normal  Neck ROM: Normal ROM    Dental:  Intact      Past Medical History:   Diagnosis Date    Elevated cholesterol     Hay fever     Hypertension     Sleep apnea      Past Surgical History:   Procedure Laterality Date    ARTHROSCOPIC REPAIR OF ANTERIOR CRUCIATE LIGAMENT Right 7/10/2018    Procedure: REPAIR, KNEE, ACL, ARTHROSCOPIC;  Surgeon: Ryland Coffey MD;  Location: [facility];  Service: Orthopedics;  Laterality: Right;  regional without catheter adductor; Clonidine/Epi/Keterolac/Ropivacaine inj 30cc    CHONDROPLASTY Right 7/10/2018    Procedure: CHONDROPLASTY;  Surgeon: Ryland Coffey MD;  Location: [facility];  Service: Orthopedics;  Laterality: Right;    COLONOSCOPY N/A 5/16/2022    Procedure: COLONOSCOPY;  Surgeon: Inez Loredo MD;  Location: [facility] ([address] [place] [address]);  Service: Colon and Rectal;  Laterality: N/A;  order created from outside referral  Fully Vaccinated, prep instr mailed -ml    INJECTION OF STEROID Right 7/10/2018    Procedure: INJECTION, STEROID; amniox right knee;  Surgeon: Ryland Coffey MD;  Location: [facility];  Service: Orthopedics;  Laterality: Right;    KNEE SURGERY      SYNOVECTOMY Right 7/10/2018    Procedure: SYNOVECTOMY;  Surgeon: Ryland Coffey MD;  Location: [facility];  Service: Orthopedics;  Laterality: Right;    TENDON REPAIR Left     thumb    VASECTOMY         Anesthesia Assessment: Preoperative EQUATION    Planned Procedure: Procedure(s) (LRB):  RECONSTRUCTION, KNEE, ACL, ARTHROSCOPIC (Right)  ALL RECONSTRUCTION (Right)  ARTHROSCOPY,KNEE,WITH MENISCUS REPAIR (Right)  ARTHROSCOPY, KNEE, WITH CHONDROPLASTY (Right)  LYSIS, ADHESIONS, KNEE, ARTHROSCOPIC (Right)  Requested Anesthesia Type:General  Surgeon: Ryland Coffey MD  Service: Orthopedics  Known or anticipated Date of Surgery:11/9/2023    Surgeon notes: reviewed    Electronic QUestionnaire Assessment completed via nurse interview with patient.        Triage considerations:     The patient has no apparent active  cardiac condition (No unstable coronary Syndrome such as severe unstable angina or recent [<1 month] myocardial infarction, decompensated CHF, severe valvular   disease or significant arrhythmia)    Previous anesthesia records:LMA General, MAC, Nerve block for post-op pain, Easy airway, and No problems      Last PCP note: > 1 year ago , within [facility]     Patient is medically optimized for surgery per outside PCP.       Other important co-morbidities: HLD, HTN, Obesity, JANNETH, and Smoker                Instructions given. (See in Nurse's note)      Ht: 5'10  Wt: 240 lb  BMI: 34.48  Vaccinated      Anesthesia Plan  Type of Anesthesia, risks & benefits discussed:    Anesthesia Type: Gen ETT, Regional  Intra-op Monitoring Plan: Standard ASA Monitors  Post Op Pain Control Plan: multimodal analgesia, peripheral nerve block and IV/PO Opioids PRN  Induction:  IV  Airway Plan: Direct  Informed Consent: Informed consent signed with the Patient and all parties understand the risks and agree with anesthesia plan.  All questions answered.   ASA Score: 2    Ready For Surgery From Anesthesia Perspective.       .

## 2023-11-09 NOTE — ANESTHESIA PROCEDURE NOTES
Intubation    Date/Time: 11/9/2023 9:48 AM    Performed by: Memo Oshea CRNA  Authorized by: Valarie Denis MD    Intubation:     Induction:  Intravenous    Intubated:  Postinduction    Mask Ventilation:  Easy mask    Attempted By:  ABDON    Blade:  Jimenez 3    Laryngeal View Grade: Grade I - full view of cords      Difficult Airway Encountered?: No      Complications:  None    Airway Device:  Oral endotracheal tube    Airway Device Size:  7.5    Style/Cuff Inflation:  Cuffed    Inflation Amount (mL):  6    Tube secured:  21    Secured at:  The lips    Complicating Factors:  None    Findings Post-Intubation:  BS equal bilateral and atraumatic/condition of teeth unchanged

## 2023-11-10 NOTE — ANESTHESIA POST-OP PAIN MANAGEMENT
"Acute Pain Service Progress Note    11/10/2023 1333    Patient contacted regarding [facility] infusion follow up. Reports that pain has been well controlled with the infusion pump. Denies signs of local anesthetic toxicity. PNC dressing remains clean, dry, and intact. All questions answered. Reminded patient that the infusion should be discontinued and PNC removed whenever the "infusion complete" alert is seen on the display screen or by POD 5 (11/14/23) at 12pm, whichever comes first. Encouraged patient to call the [facility] 24/7 support line at [phone] or the [facility] line at [phone] for any [facility] related questions/issues. Verbalizes understanding. Will continue to follow up.        "

## 2023-11-13 ENCOUNTER — CLINICAL SUPPORT (OUTPATIENT)
Dept: REHABILITATION | Facility: HOSPITAL | Age: 53
End: 2023-11-13
Attending: ORTHOPAEDIC SURGERY
Payer: COMMERCIAL

## 2023-11-13 DIAGNOSIS — M25.561 ACUTE PAIN OF RIGHT KNEE: Primary | ICD-10-CM

## 2023-11-13 PROCEDURE — 97140 MANUAL THERAPY 1/> REGIONS: CPT

## 2023-11-13 PROCEDURE — 97110 THERAPEUTIC EXERCISES: CPT

## 2023-11-13 PROCEDURE — 97112 NEUROMUSCULAR REEDUCATION: CPT

## 2023-11-13 PROCEDURE — 97164 PT RE-EVAL EST PLAN CARE: CPT

## 2023-11-13 NOTE — ANESTHESIA POST-OP PAIN MANAGEMENT
Acute Pain Service Progress Note    11/13/2023  1512    Unable to reach patient for [facility] follow up. Voicemail left on patient's cell number encouraging to contact anesthesia at [phone] or [facility] 24/7 support line at [phone] for any questions or concerns about the nerve block or infusion pump. Nimbus due to be discontinued by tomorrow at noon.

## 2023-11-13 NOTE — PROGRESS NOTES
Re-assessment/ update POC    [facility]   Physical Therapy Treatment Note      Name: Fredy oB  [facility] Number: [id number]    Therapy Diagnosis:   Encounter Diagnosis   Name Primary?    Acute pain of right knee Yes     Physician: Rylnad Coffey MD     Physician Orders: PT Eval and Treat ACL tear  Medical Diagnosis from Referral: Rupture of anterior cruciate ligament of right knee, initial encounter [S83.511A]  Evaluation Date: 11/1/2023  Authorization Period Expiration: 11/30/23  Plan of Care Expiration: 12/31/23  Progress Note Due: 12/31/23  Visit # / Visits authorized: 1/20   FOTO: 1/3    PROCEDURES(S) PERFORMED: 11/9/23  1. Right Arthroscopy, anterior cruciate ligament reconstruction 63348  2.  Right Arthroscopy, with meniscectomy (medial OR lateral) 63262, medial  3.  Right Arthroscopy, debridement/shaving of articular cartilage (chondroplasty) 42840  4.  Right Arthroscopy, with lysis of adhesions 37999  5.  Right Arthroscopy, knee, synovectomy, limited 50838     Precautions: Standard   PHYSICAL THERAPY:  The patient should begin physical therapy on postoperative   day #1-3 and will be advanced to outpatient therapy as soon as   Possible following discharge.     Weight bearing:as tolerated  right leg  Range of Motion:Full normal motion symmetric to opposite side    Time In: 2:30 pm   Time Out: 3:30 pm   Total Billable Time: 60 minutes     Subjective     Patient reports:he tolerated above procedure well. Pt reports post op pain has been manageable. Pt reports compliance with T-scope and post op precaution discussed after surgery    He was compliant with home exercise program.  Response to previous treatment: n/a, initial eval   Functional change: n/a, initial eval     Pain: 0/10     Location: right knee    Objective      Re-assessment 15 min    POD #4    Observation: Pt presents w/ single axillary crutch WBAT on R LE w/ t-scope locked in extension. Pt's dressing changed, minimal  "drainage at lateral incision covered with bandage. No signs of infection or DVT    Range of Motion:   Knee Right Left   Active 0-50 0-137   Passive 0- 80 0-137     Function:    - quad set: Good  - SLR: minimal lag  - Bed Mobility: modified independent    Joint Mobility: patellar mobility restricted as expected post-op.    Palpation:  Pt grossly TTP to R knee as expected post-op.    Sensation: Intact but diminished 2/2 residual nerve block.     Treatment     Fredy received the treatments listed below:     MANUAL THERAPY TECHNIQUES were applied for (10) minutes, including:  Patellar mobs all directions  Fat pad mob  Extension hinge stretch  Range of motion reassessment      THERAPEUTIC EXERCISES to develop strength, endurance, ROM, flexibility, posture, and core stabilization for (10) minutes including:    Knee ext LLLD 5 min  HS strap stretch off EOT 2x30 sec  Gastroc strap stretch 2x30 sec  Heel slide 3x20    NEUROMUSCULAR RE-EDUCATION ACTIVITIES to improve Balance, Coordination, Kinesthetic, Sense, Proprioception, and Posture for (25) minutes.  The following were included:    Biofeedback to R quad 10" on/ 10" off    - quad set 5 min   - quad set w/ strap assist into ext on twoel roll   - SLR 3x10   - SL hip abduction 3x10      THERAPEUTIC ACTIVITIES to improve dynamic and functional  performance for (0) minutes including:      Patient Education and Home Exercises       Home Exercises Provided and Patient Education Provided     Education provided:   post op precautions, gait mechanics, POC, prognosis, return to activity, HEP     Written Home Exercises Provided: yes.  Exercises were reviewed and Fredy was able to demonstrate them prior to the end of the session.  Fredy demonstrated good  understanding of the education provided. See EMR under Patient Instructions for exercises provided during therapy sessions.    Assessment     Fredy presents POD #4 with decreased strength, decreased ROM, decreased flexibility, faulty " posture, and increased pain. Due to impairments, pt is unable to ambulate, perform transfers, tolerate ADL's or job duties at [employer]. Pt will benefit from skilled PT to maximize strength and ROM in order to return to ADL's, job duties and physical activity at [employer]    Fredy is progressing well towards his goals.   Patient prognosis is Good.     Patient will continue to benefit from skilled outpatient physical therapy to address the deficits listed in the problem list box on initial evaluation, provide pt/family education and to maximize patient's level of independence in the home and community environment.     Patient's spiritual, cultural and educational needs considered and pt agreeable to plan of care and goals.     Anticipated barriers to physical therapy: work schedule      Goals:  Short Term Goals: 2 weeks     Pt will be independent with HEP and report compliance at least 4 days/week  Pt will demonstrate full passive knee extension and knee flexion to at least 90 deg  Pt will be able to perform 15 SLR without extension lag to demonstrate improved quad strength for gait    Mid term goals: within 12 weeks    Pt will be able to amb 30 min on level surface without gait deviations  Pt will be able to perform sit<>stand to standard chair x10 with equal weight shift  Pt will be able to perform all ADL's/ job responsibilities at [employer] without pain   Pt will demonstrate full symmetrical ROM on R vs L    Long term goals: with 9 months    Pt will demonstrate at least 90% quad strength on R vs L assessed on crane scale at 60 deg flexion  2.   Pt will demonstrate no greater than 4cm difference on anterior Y balance reach to demonstrate symmetrical LE function    Plan     Plan of care Certification: 11/13/2023 to 8/13/24.     Outpatient Physical Therapy 2 times weekly for 9 months to include the following interventions: Electrical Stimulation TENS/NMES, Gait Training, Manual Therapy, Moist Heat/ Ice, Neuromuscular Re-ed,  Patient Education, Self Care, Therapeutic Activities, and Therapeutic Exercise.   Keagan Domínguez, PT, DPT

## 2023-11-14 NOTE — PLAN OF CARE
Re-assessment/ update POC    [facility]   Physical Therapy Treatment Note      Name: Fredy Bo  [facility] Number: [id number]    Therapy Diagnosis:   Encounter Diagnosis   Name Primary?    Acute pain of right knee Yes     Physician: Ryland Coffey MD     Physician Orders: PT Eval and Treat ACL tear  Medical Diagnosis from Referral: Rupture of anterior cruciate ligament of right knee, initial encounter [S83.511A]  Evaluation Date: 11/1/2023  Authorization Period Expiration: 11/30/23  Plan of Care Expiration: 12/31/23  Progress Note Due: 12/31/23  Visit # / Visits authorized: 1/20   FOTO: 1/3    PROCEDURES(S) PERFORMED: 11/9/23  1. Right Arthroscopy, anterior cruciate ligament reconstruction 48587  2.  Right Arthroscopy, with meniscectomy (medial OR lateral) 09902, medial  3.  Right Arthroscopy, debridement/shaving of articular cartilage (chondroplasty) 60824  4.  Right Arthroscopy, with lysis of adhesions 58728  5.  Right Arthroscopy, knee, synovectomy, limited 39077     Precautions: Standard   PHYSICAL THERAPY:  The patient should begin physical therapy on postoperative   day #1-3 and will be advanced to outpatient therapy as soon as   Possible following discharge.     Weight bearing:as tolerated  right leg  Range of Motion:Full normal motion symmetric to opposite side    Time In: 2:30 pm   Time Out: 3:30 pm   Total Billable Time: 60 minutes     Subjective     Patient reports:he tolerated above procedure well. Pt reports post op pain has been manageable. Pt reports compliance with T-scope and post op precaution discussed after surgery    He was compliant with home exercise program.  Response to previous treatment: n/a, initial eval   Functional change: n/a, initial eval     Pain: 0/10     Location: right knee    Objective      Re-assessment 15 min    POD #4    Observation: Pt presents w/ single axillary crutch WBAT on R LE w/ t-scope locked in extension. Pt's dressing changed, minimal  "drainage at lateral incision covered with bandage. No signs of infection or DVT    Range of Motion:   Knee Right Left   Active 0-50 0-137   Passive 0- 80 0-137     Function:    - quad set: Good  - SLR: minimal lag  - Bed Mobility: modified independent    Joint Mobility: patellar mobility restricted as expected post-op.    Palpation:  Pt grossly TTP to R knee as expected post-op.    Sensation: Intact but diminished 2/2 residual nerve block.     Treatment     Fredy received the treatments listed below:     MANUAL THERAPY TECHNIQUES were applied for (10) minutes, including:  Patellar mobs all directions  Fat pad mob  Extension hinge stretch  Range of motion reassessment      THERAPEUTIC EXERCISES to develop strength, endurance, ROM, flexibility, posture, and core stabilization for (10) minutes including:    Knee ext LLLD 5 min  HS strap stretch off EOT 2x30 sec  Gastroc strap stretch 2x30 sec  Heel slide 3x20    NEUROMUSCULAR RE-EDUCATION ACTIVITIES to improve Balance, Coordination, Kinesthetic, Sense, Proprioception, and Posture for (25) minutes.  The following were included:    Biofeedback to R quad 10" on/ 10" off    - quad set 5 min   - quad set w/ strap assist into ext on twoel roll   - SLR 3x10   - SL hip abduction 3x10      THERAPEUTIC ACTIVITIES to improve dynamic and functional  performance for (0) minutes including:      Patient Education and Home Exercises       Home Exercises Provided and Patient Education Provided     Education provided:   post op precautions, gait mechanics, POC, prognosis, return to activity, HEP     Written Home Exercises Provided: yes.  Exercises were reviewed and Fredy was able to demonstrate them prior to the end of the session.  Fredy demonstrated good  understanding of the education provided. See EMR under Patient Instructions for exercises provided during therapy sessions.    Assessment     Fredy presents POD #4 with decreased strength, decreased ROM, decreased flexibility, faulty " posture, and increased pain. Due to impairments, pt is unable to ambulate, perform transfers, tolerate ADL's or job duties at [employer]. Pt will benefit from skilled PT to maximize strength and ROM in order to return to ADL's, job duties and physical activity at [employer]    Fredy is progressing well towards his goals.   Patient prognosis is Good.     Patient will continue to benefit from skilled outpatient physical therapy to address the deficits listed in the problem list box on initial evaluation, provide pt/family education and to maximize patient's level of independence in the home and community environment.     Patient's spiritual, cultural and educational needs considered and pt agreeable to plan of care and goals.     Anticipated barriers to physical therapy: work schedule      Goals:  Short Term Goals: 2 weeks     Pt will be independent with HEP and report compliance at least 4 days/week  Pt will demonstrate full passive knee extension and knee flexion to at least 90 deg  Pt will be able to perform 15 SLR without extension lag to demonstrate improved quad strength for gait    Mid term goals: within 12 weeks    Pt will be able to amb 30 min on level surface without gait deviations  Pt will be able to perform sit<>stand to standard chair x10 with equal weight shift  Pt will be able to perform all ADL's/ job responsibilities at [employer] without pain   Pt will demonstrate full symmetrical ROM on R vs L    Long term goals: with 9 months    Pt will demonstrate at least 90% quad strength on R vs L assessed on crane scale at 60 deg flexion  2.   Pt will demonstrate no greater than 4cm difference on anterior Y balance reach to demonstrate symmetrical LE function    Plan     Plan of care Certification: 11/13/2023 to 8/13/24.     Outpatient Physical Therapy 2 times weekly for 9 months to include the following interventions: Electrical Stimulation TENS/NMES, Gait Training, Manual Therapy, Moist Heat/ Ice, Neuromuscular Re-ed,  Patient Education, Self Care, Therapeutic Activities, and Therapeutic Exercise.   Keagan Domínguez, PT, DPT

## 2023-11-15 ENCOUNTER — CLINICAL SUPPORT (OUTPATIENT)
Dept: REHABILITATION | Facility: HOSPITAL | Age: 53
End: 2023-11-15
Payer: COMMERCIAL

## 2023-11-15 ENCOUNTER — PATIENT MESSAGE (OUTPATIENT)
Dept: ADMINISTRATIVE | Facility: OTHER | Age: 53
End: 2023-11-15
Payer: COMMERCIAL

## 2023-11-15 DIAGNOSIS — M25.561 ACUTE PAIN OF RIGHT KNEE: Primary | ICD-10-CM

## 2023-11-15 DIAGNOSIS — M23.91 ACUTE INTERNAL DERANGEMENT OF RIGHT KNEE: ICD-10-CM

## 2023-11-15 PROCEDURE — 97110 THERAPEUTIC EXERCISES: CPT

## 2023-11-15 PROCEDURE — 97112 NEUROMUSCULAR REEDUCATION: CPT

## 2023-11-15 PROCEDURE — 97140 MANUAL THERAPY 1/> REGIONS: CPT

## 2023-11-20 NOTE — PROGRESS NOTES
Re-assessment/ update POC [facility]   Physical Therapy Treatment Note      Name: Fredy Bo  [facility] Number: [id number]    Therapy Diagnosis:   Encounter Diagnoses   Name Primary?    Acute internal derangement of right knee     Acute pain of right knee Yes     Physician: Ryland Coffey MD     Physician Orders: PT Eval and Treat ACL tear  Medical Diagnosis from Referral: Rupture of anterior cruciate ligament of right knee, initial encounter [S83.511A]  Evaluation Date: 11/1/2023  Authorization Period Expiration: 11/30/23  Plan of Care Expiration: 12/31/23  Progress Note Due: 12/31/23  Visit # / Visits authorized: 1/20   FOTO: 1/3    PROCEDURES(S) PERFORMED: 11/9/23  1. Right Arthroscopy, anterior cruciate ligament reconstruction 26236  2.  Right Arthroscopy, with meniscectomy (medial OR lateral) 40547, medial  3.  Right Arthroscopy, debridement/shaving of articular cartilage (chondroplasty) 39959  4.  Right Arthroscopy, with lysis of adhesions 54040  5.  Right Arthroscopy, knee, synovectomy, limited 78132     Precautions: Standard   PHYSICAL THERAPY:  The patient should begin physical therapy on postoperative   day #1-3 and will be advanced to outpatient therapy as soon as   Possible following discharge.     Weight bearing:as tolerated  right leg  Range of Motion:Full normal motion symmetric to opposite side  Immobilizer if present should be locked @-10 degrees with gait and allowed to flex to 120 degrees at rest.     Time In: 2:30 pm   Time Out: 3:30 pm   Total Billable Time: 60 minutes     Subjective     Patient reports:he has been feeling good since re-eval. Bending feeling good, no pain w/ HEP    He was compliant with home exercise program.  Response to previous treatment: n/a, initial eval   Functional change: n/a, initial eval     Pain: 0/10     Location: right knee    Objective      POD #4    Observation: Pt presents w/ single axillary crutch WBAT on R LE w/ t-scope locked in  "extension. Aquacel intact with min drainage well contained     Range of Motion:   Knee Right Left   Active 0-50 0-137   Passive 0- 90 0-137     Function:    - quad set: Good  - SLR: minimal lag  - Bed Mobility: modified independent      Treatment     Fredy received the treatments listed below:     MANUAL THERAPY TECHNIQUES were applied for (10) minutes, including:  Patellar mobs all directions  Fat pad mob  Extension hinge stretch  Flexion off EOT     THERAPEUTIC EXERCISES to develop strength, endurance, ROM, flexibility, posture, and core stabilization for (25) minutes including:    Knee ext LLLD 2x5 min  HS strap stretch off EOT 2x30 sec  Gastroc strap stretch 2x30 sec  Heel slide 3x20    NEUROMUSCULAR RE-EDUCATION ACTIVITIES to improve Balance, Coordination, Kinesthetic, Sense, Proprioception, and Posture for (25) minutes.  The following were included:    Biofeedback to R quad 10" on/ 10" off    - quad set 5 min   - quad set w/ strap assist into ext on towel roll 5 min   - SLR 3x10   - SL hip abduction 3x10    THERAPEUTIC ACTIVITIES to improve dynamic and functional  performance for (0) minutes including:      Patient Education and Home Exercises       Home Exercises Provided and Patient Education Provided     Education provided:   post op precautions, gait mechanics, POC, prognosis, return to activity, HEP     Written Home Exercises Provided: yes.  Exercises were reviewed and Fredy was able to demonstrate them prior to the end of the session.  Fredy demonstrated good  understanding of the education provided. See EMR under Patient Instructions for exercises provided during therapy sessions.    Assessment     Fredy presents w/ good carryover of ROM from last visit. Pt continues to benefit from biofeedback to improve quad control. Pt instructed on continued post op precautions. Will continue to progress strength/ ROM as tolerated per protocol    Fredy is progressing well towards his goals.   Patient prognosis is " Good.     Patient will continue to benefit from skilled outpatient physical therapy to address the deficits listed in the problem list box on initial evaluation, provide pt/family education and to maximize patient's level of independence in the home and community environment.     Patient's spiritual, cultural and educational needs considered and pt agreeable to plan of care and goals.     Anticipated barriers to physical therapy: work schedule      Goals:  Short Term Goals: 2 weeks     Pt will be independent with HEP and report compliance at least 4 days/week  Pt will demonstrate full passive knee extension and knee flexion to at least 90 deg  Pt will be able to perform 15 SLR without extension lag to demonstrate improved quad strength for gait    Mid term goals: within 12 weeks    Pt will be able to amb 30 min on level surface without gait deviations  Pt will be able to perform sit<>stand to standard chair x10 with equal weight shift  Pt will be able to perform all ADL's/ job responsibilities at PLOF without pain   Pt will demonstrate full symmetrical ROM on R vs L    Long term goals: with 9 months    Pt will demonstrate at least 90% quad strength on R vs L assessed on crane scale at 60 deg flexion  2.   Pt will demonstrate no greater than 4cm difference on anterior Y balance reach to demonstrate symmetrical LE function    Plan     Plan of care Certification: 11/13/2023 to 8/13/24.     Outpatient Physical Therapy 2 times weekly for 9 months to include the following interventions: Electrical Stimulation TENS/NMES, Gait Training, Manual Therapy, Moist Heat/ Ice, Neuromuscular Re-ed, Patient Education, Self Care, Therapeutic Activities, and Therapeutic Exercise.   Keagan Domínguez, PT, DPT

## 2023-11-21 ENCOUNTER — PATIENT MESSAGE (OUTPATIENT)
Dept: SPORTS MEDICINE | Facility: CLINIC | Age: 53
End: 2023-11-21
Payer: COMMERCIAL

## 2023-11-22 ENCOUNTER — CLINICAL SUPPORT (OUTPATIENT)
Dept: REHABILITATION | Facility: HOSPITAL | Age: 53
End: 2023-11-22
Payer: COMMERCIAL

## 2023-11-22 DIAGNOSIS — M25.561 ACUTE PAIN OF RIGHT KNEE: Primary | ICD-10-CM

## 2023-11-22 PROCEDURE — 97112 NEUROMUSCULAR REEDUCATION: CPT

## 2023-11-22 PROCEDURE — 97140 MANUAL THERAPY 1/> REGIONS: CPT

## 2023-11-22 PROCEDURE — 97110 THERAPEUTIC EXERCISES: CPT

## 2023-11-22 NOTE — PROGRESS NOTES
Re-assessment/ update POC [facility]   Physical Therapy Treatment Note      Name: Fredy Bo  [facility] Number: [id number]    Therapy Diagnosis:   Encounter Diagnosis   Name Primary?    Acute pain of right knee Yes     Physician: Ryland Coffey MD     Physician Orders: PT Eval and Treat ACL tear  Medical Diagnosis from Referral: Rupture of anterior cruciate ligament of right knee, initial encounter [S83.511A]  Evaluation Date: 11/1/2023  Authorization Period Expiration: 11/30/23  Plan of Care Expiration: 12/31/23  Progress Note Due: 12/31/23  Visit # / Visits authorized: 4/20   FOTO: 1/3    PROCEDURES(S) PERFORMED: 11/9/23  1. Right Arthroscopy, anterior cruciate ligament reconstruction 37802  2.  Right Arthroscopy, with meniscectomy (medial OR lateral) 98873, medial  3.  Right Arthroscopy, debridement/shaving of articular cartilage (chondroplasty) 36789  4.  Right Arthroscopy, with lysis of adhesions 58176  5.  Right Arthroscopy, knee, synovectomy, limited 37793     Precautions: Standard   PHYSICAL THERAPY:  The patient should begin physical therapy on postoperative   day #1-3 and will be advanced to outpatient therapy as soon as   Possible following discharge.     Weight bearing:as tolerated  right leg  Range of Motion:Full normal motion symmetric to opposite side  Immobilizer if present should be locked @-10 degrees with gait and allowed to flex to 120 degrees at rest.     Time In: 800 am   Time Out: 900 am   Total Billable Time: 60 minutes     Subjective     Patient reports:  knee has been feeling good since last visit    He was compliant with home exercise program.  Response to previous treatment: n/a, initial eval   Functional change: n/a, initial eval     Pain: 0/10     Location: right knee    Objective      POD #1 wk 6 days    Observation: Pt presents w/ single axillary crutch WBAT on R LE w/ t-scope locked in extension. Aquacel intact with min drainage well contained  "    Range of Motion:   Knee Right Left   Active 0-50 0-137   Passive 0- 95 0-137     Function:    - quad set: Good  - SLR: minimal lag  - Bed Mobility: modified independent      Treatment     Fredy received the treatments listed below:     MANUAL THERAPY TECHNIQUES were applied for (10) minutes, including:  Patellar mobs all directions  Fat pad mob  Extension hinge stretch  Flexion off EOT     THERAPEUTIC EXERCISES to develop strength, endurance, ROM, flexibility, posture, and core stabilization for (15) minutes including:    Knee ext LLLD 2x5 min  Heel slide 3x20  Upright bike for 5 min for tissue extensibility/ pain modulation    NP today  HS strap stretch off EOT 2x30 sec  Gastroc strap stretch 2x30 sec      NEUROMUSCULAR RE-EDUCATION ACTIVITIES to improve Balance, Coordination, Kinesthetic, Sense, Proprioception, and Posture for (35) minutes.  The following were included:    quad set on towel roll 5" hold 2x20  SLR 3x10  SL hip abduction 3x15  LAQ 3# 4x15  HS curl against MR 3x30       THERAPEUTIC ACTIVITIES to improve dynamic and functional  performance for (0) minutes including:      Patient Education and Home Exercises       Home Exercises Provided and Patient Education Provided     Education provided:   post op precautions, gait mechanics, POC, prognosis, return to activity, HEP     Written Home Exercises Provided: yes.  Exercises were reviewed and Fredy was able to demonstrate them prior to the end of the session.  Fredy demonstrated good  understanding of the education provided. See EMR under Patient Instructions for exercises provided during therapy sessions.    Assessment     Fredy with good carryover of ROM from last visit. Pt tolerates bike fairly well, able to progress flexion after getting around on the bike. Pt with good quad tone ,no lag on SLR. Will continue to progress quad tone / ROM as tolerated per protocol.    Fredy is progressing well towards his goals.   Patient prognosis is Good. "     Patient will continue to benefit from skilled outpatient physical therapy to address the deficits listed in the problem list box on initial evaluation, provide pt/family education and to maximize patient's level of independence in the home and community environment.     Patient's spiritual, cultural and educational needs considered and pt agreeable to plan of care and goals.     Anticipated barriers to physical therapy: work schedule      Goals:  Short Term Goals: 2 weeks     Pt will be independent with HEP and report compliance at least 4 days/week  Pt will demonstrate full passive knee extension and knee flexion to at least 90 deg  Pt will be able to perform 15 SLR without extension lag to demonstrate improved quad strength for gait    Mid term goals: within 12 weeks    Pt will be able to amb 30 min on level surface without gait deviations  Pt will be able to perform sit<>stand to standard chair x10 with equal weight shift  Pt will be able to perform all ADL's/ job responsibilities at PLOF without pain   Pt will demonstrate full symmetrical ROM on R vs L    Long term goals: with 9 months    Pt will demonstrate at least 90% quad strength on R vs L assessed on crane scale at 60 deg flexion  2.   Pt will demonstrate no greater than 4cm difference on anterior Y balance reach to demonstrate symmetrical LE function    Plan     Plan of care Certification: 11/13/2023 to 8/13/24.     Outpatient Physical Therapy 2 times weekly for 9 months to include the following interventions: Electrical Stimulation TENS/NMES, Gait Training, Manual Therapy, Moist Heat/ Ice, Neuromuscular Re-ed, Patient Education, Self Care, Therapeutic Activities, and Therapeutic Exercise.   Keagan Domínguez, PT, DPT

## 2023-11-27 ENCOUNTER — CLINICAL SUPPORT (OUTPATIENT)
Dept: REHABILITATION | Facility: HOSPITAL | Age: 53
End: 2023-11-27
Payer: COMMERCIAL

## 2023-11-27 DIAGNOSIS — M25.561 ACUTE PAIN OF RIGHT KNEE: Primary | ICD-10-CM

## 2023-11-27 PROCEDURE — 97110 THERAPEUTIC EXERCISES: CPT

## 2023-11-27 PROCEDURE — 97112 NEUROMUSCULAR REEDUCATION: CPT

## 2023-11-27 PROCEDURE — 97140 MANUAL THERAPY 1/> REGIONS: CPT

## 2023-11-27 NOTE — PROGRESS NOTES
Re-assessment/ update POC [facility]   Physical Therapy Treatment Note      Name: Fredy Bo  [facility] Number: [id number]    Therapy Diagnosis:   Encounter Diagnosis   Name Primary?    Acute pain of right knee Yes     Physician: Ryland Coffey MD     Physician Orders: PT Eval and Treat ACL tear  Medical Diagnosis from Referral: Rupture of anterior cruciate ligament of right knee, initial encounter [S83.511A]  Evaluation Date: 11/1/2023  Authorization Period Expiration: 11/30/23  Plan of Care Expiration: 12/31/23  Progress Note Due: 12/31/23  Visit # / Visits authorized: 5/20   FOTO: 1/3    PROCEDURES(S) PERFORMED: 11/9/23  1. Right Arthroscopy, anterior cruciate ligament reconstruction 18929  2.  Right Arthroscopy, with meniscectomy (medial OR lateral) 67343, medial  3.  Right Arthroscopy, debridement/shaving of articular cartilage (chondroplasty) 06812  4.  Right Arthroscopy, with lysis of adhesions 65554  5.  Right Arthroscopy, knee, synovectomy, limited 57249     Precautions: Standard   PHYSICAL THERAPY:  The patient should begin physical therapy on postoperative   day #1-3 and will be advanced to outpatient therapy as soon as   Possible following discharge.     Weight bearing:as tolerated  right leg  Range of Motion:Full normal motion symmetric to opposite side  Immobilizer if present should be locked @-10 degrees with gait and allowed to flex to 120 degrees at rest.     Time In: 9:05 am   Time Out: 10:00 am   Total Billable Time: 55 minutes     Subjective     Patient reports:  knee feeling okay, bike was painful last time. HEP going well.     He was compliant with home exercise program.  Response to previous treatment: minimal soreness   Functional change: n/a, initial eval     Pain: 0/10     Location: right knee    Objective      POD #2 wks 4 days    Observation: Pt presents w/ single axillary crutch WBAT on R LE w/ t-scope locked in extension. Aquacel intact with drainage well  "contained     Range of Motion:   Knee Right Left   Active 0-100 0-137   Passive 0- 100 0-137     Function:    - quad set: Good  - SLR: minimal lag  - Bed Mobility: modified independent      Treatment     Fredy received the treatments listed below:     MANUAL THERAPY TECHNIQUES were applied for (10) minutes, including:  Patellar mobs all directions  Fat pad mob  Extension hinge stretch  Flexion off EOT     THERAPEUTIC EXERCISES to develop strength, endurance, ROM, flexibility, posture, and core stabilization for (15) minutes including:    Knee ext LLLD 2x5 min w/ 4#   Hinge stretch with strap 4x15"   Heel slide 3x20    NP today  HS strap stretch off EOT 2x30 sec  Gastroc strap stretch 2x30 sec  Upright bike for 5 min for tissue extensibility/ pain modulation    NEUROMUSCULAR RE-EDUCATION ACTIVITIES to improve Balance, Coordination, Kinesthetic, Sense, Proprioception, and Posture for (30) minutes.  The following were included:    Quad set 10" hold x20   SLR 3x10  SL hip abduction 3x15  LAQ 3# 4x15  HS curl against MR 3x30       THERAPEUTIC ACTIVITIES to improve dynamic and functional  performance for (0) minutes including:      Patient Education and Home Exercises       Home Exercises Provided and Patient Education Provided     Education provided:   post op precautions, gait mechanics, POC, prognosis, return to activity, HEP     Written Home Exercises Provided: yes.  Exercises were reviewed and Fredy was able to demonstrate them prior to the end of the session.  Fredy demonstrated good  understanding of the education provided. See EMR under Patient Instructions for exercises provided during therapy sessions.    Assessment     Fredy with good carryover of ROM from last visit. He demonstrates good quad tone with quad sets and SLR. Able to progress flexion to 100 actively and passively after heel slides. Revisited proper use of single crutch and gait mechanics. Will continue to progress quad tone / ROM as tolerated per " protocol.    Fredy is progressing well towards his goals.   Patient prognosis is Good.     Patient will continue to benefit from skilled outpatient physical therapy to address the deficits listed in the problem list box on initial evaluation, provide pt/family education and to maximize patient's level of independence in the home and community environment.     Patient's spiritual, cultural and educational needs considered and pt agreeable to plan of care and goals.     Anticipated barriers to physical therapy: work schedule      Goals:  Short Term Goals: 2 weeks     Pt will be independent with HEP and report compliance at least 4 days/week  Pt will demonstrate full passive knee extension and knee flexion to at least 90 deg  Pt will be able to perform 15 SLR without extension lag to demonstrate improved quad strength for gait    Mid term goals: within 12 weeks    Pt will be able to amb 30 min on level surface without gait deviations  Pt will be able to perform sit<>stand to standard chair x10 with equal weight shift  Pt will be able to perform all ADL's/ job responsibilities at PLOF without pain   Pt will demonstrate full symmetrical ROM on R vs L    Long term goals: with 9 months    Pt will demonstrate at least 90% quad strength on R vs L assessed on crane scale at 60 deg flexion  2.   Pt will demonstrate no greater than 4cm difference on anterior Y balance reach to demonstrate symmetrical LE function    Plan     Plan of care Certification: 11/13/2023 to 8/13/24.     Outpatient Physical Therapy 2 times weekly for 9 months to include the following interventions: Electrical Stimulation TENS/NMES, Gait Training, Manual Therapy, Moist Heat/ Ice, Neuromuscular Re-ed, Patient Education, Self Care, Therapeutic Activities, and Therapeutic Exercise.   Keagan Domínguez, PT, DPT

## 2023-11-29 ENCOUNTER — OFFICE VISIT (OUTPATIENT)
Dept: SPORTS MEDICINE | Facility: CLINIC | Age: 53
End: 2023-11-29
Payer: COMMERCIAL

## 2023-11-29 ENCOUNTER — HOSPITAL ENCOUNTER (OUTPATIENT)
Dept: RADIOLOGY | Facility: HOSPITAL | Age: 53
Discharge: HOME | End: 2023-11-29
Attending: PHYSICIAN ASSISTANT
Payer: COMMERCIAL

## 2023-11-29 DIAGNOSIS — M25.561 ACUTE PAIN OF RIGHT KNEE: ICD-10-CM

## 2023-11-29 DIAGNOSIS — Z98.890 S/P ACL RECONSTRUCTION: Primary | ICD-10-CM

## 2023-11-29 DIAGNOSIS — M25.561 RIGHT KNEE PAIN, UNSPECIFIED CHRONICITY: ICD-10-CM

## 2023-11-29 PROCEDURE — 73560 X-RAY EXAM OF KNEE 1 OR 2: CPT | Mod: TC,RT

## 2023-11-29 PROCEDURE — 73560 XR KNEE 1 OR 2 VIEW RIGHT: ICD-10-PCS | Mod: 26,RT,, | Performed by: RADIOLOGY

## 2023-11-29 PROCEDURE — 1160F PR REVIEW ALL MEDS BY PRESCRIBER/CLIN PHARMACIST DOCUMENTED: ICD-10-PCS | Mod: CPTII,S$GLB,, | Performed by: PHYSICIAN ASSISTANT

## 2023-11-29 PROCEDURE — 4010F PR ACE/ARB THEARPY RXD/TAKEN: ICD-10-PCS | Mod: CPTII,S$GLB,, | Performed by: PHYSICIAN ASSISTANT

## 2023-11-29 PROCEDURE — 73560 X-RAY EXAM OF KNEE 1 OR 2: CPT | Mod: 26,RT,, | Performed by: RADIOLOGY

## 2023-11-29 PROCEDURE — 1160F RVW MEDS BY RX/DR IN RCRD: CPT | Mod: CPTII,S$GLB,, | Performed by: PHYSICIAN ASSISTANT

## 2023-11-29 PROCEDURE — 4010F ACE/ARB THERAPY RXD/TAKEN: CPT | Mod: CPTII,S$GLB,, | Performed by: PHYSICIAN ASSISTANT

## 2023-11-29 PROCEDURE — 99024 PR POST-OP FOLLOW-UP VISIT: ICD-10-PCS | Mod: S$GLB,,, | Performed by: PHYSICIAN ASSISTANT

## 2023-11-29 PROCEDURE — 99999 PR PBB SHADOW E&M-EST. PATIENT-LVL III: ICD-10-PCS | Mod: PBBFAC,,, | Performed by: PHYSICIAN ASSISTANT

## 2023-11-29 PROCEDURE — 1159F MED LIST DOCD IN RCRD: CPT | Mod: CPTII,S$GLB,, | Performed by: PHYSICIAN ASSISTANT

## 2023-11-29 PROCEDURE — 99024 POSTOP FOLLOW-UP VISIT: CPT | Mod: S$GLB,,, | Performed by: PHYSICIAN ASSISTANT

## 2023-11-29 PROCEDURE — 1159F PR MEDICATION LIST DOCUMENTED IN MEDICAL RECORD: ICD-10-PCS | Mod: CPTII,S$GLB,, | Performed by: PHYSICIAN ASSISTANT

## 2023-11-29 PROCEDURE — 99999 PR PBB SHADOW E&M-EST. PATIENT-LVL III: CPT | Mod: PBBFAC,,, | Performed by: PHYSICIAN ASSISTANT

## 2023-11-29 RX ORDER — TRAMADOL HYDROCHLORIDE 50 MG/1
50 TABLET ORAL EVERY 6 HOURS PRN
Qty: 28 TABLET | Refills: 0 | Status: SHIPPED | OUTPATIENT
Start: 2023-11-29

## 2023-11-29 NOTE — PROGRESS NOTES
Subjective:     Chief Complaint: Fredy Bo is a 53 y.o. male who had concerns including Pain of the Right Knee.    Patient presents to clinic for 2 week post op evaluation of Right knee. Pain today is 6/10. No longer taking narcotics due to constipation. Requesting Tramadol instead of roxicodone. Denies nausea, vomiting, fever, chills, CP, and SOB. Malcolm been attending formal PT at [facility] 2x a week. He has been taking ASA 325mg once daily for DVT prophylaxis. Doing well. Ambulating with one crutch and T-scope brace locked in extension with gait WBAT.    Date of Procedure: 11/9/2023      Procedure: 1. Right  Arthroscopy, anterior cruciate ligament reconstruction 45049  2. Right   62077 Ligament knee reconstruction, extra-articular  2.  Right  Arthroscopy, with meniscectomy (medial OR lateral) 63692, medial  3.  Right  Arthroscopy, debridement/shaving of articular cartilage (chondroplasty) 61199  4.  Right  Arthroscopy, with lysis of adhesions 98856  5.  Right  Arthroscopy, knee, synovectomy, limited 84374     Surgeon(s) and Role:     * Ryland Coffey MD - Primary     Assisting Surgeon:      MD Indira Sexton  [facility]          Review of Systems   Constitutional: Negative. Negative for chills, fever, weight gain and weight loss.   HENT:  Negative for congestion and sore throat.    Eyes:  Negative for blurred vision and double vision.   Cardiovascular:  Negative for chest pain, leg swelling and palpitations.   Respiratory:  Negative for cough and shortness of breath.    Hematologic/Lymphatic: Does not bruise/bleed easily.   Skin:  Negative for itching, poor wound healing and rash.   Musculoskeletal:  Positive for joint pain, joint swelling and stiffness. Negative for back pain, muscle weakness and myalgias.   Gastrointestinal:  Negative for abdominal pain, constipation, diarrhea, nausea and vomiting.   Genitourinary: Negative.  Negative for frequency and hematuria.   Neurological:  Negative  for dizziness, headaches, numbness, paresthesias and sensory change.   Psychiatric/Behavioral:  Negative for altered mental status and depression. The patient is not nervous/anxious.    Allergic/Immunologic: Negative for hives.       Pain Related Questions  Over the past 3 days, what was your highest pain level?: 8  Over the past 3 days, what was your lowest pain level? : 8    Other  How many nights a week are you awakened by your affected body part?: 7  Was the patient's HEIGHT measured or patient reported?: Patient Reported  Was the patient's WEIGHT measured or patient reported?: Measured    Objective:     General: Fredy is well-developed, well-nourished, appears stated age, in no acute distress, alert and oriented to time, place and person.     General    Vitals reviewed.  Constitutional: He is oriented to person, place, and time. He appears well-developed and well-nourished. No distress.   Cardiovascular:  Normal rate and regular rhythm.            Pulmonary/Chest: Effort normal. No respiratory distress.   Neurological: He is alert and oriented to person, place, and time.   Psychiatric: He has a normal mood and affect. His behavior is normal. Thought content normal.     General Musculoskeletal Exam   Gait: abnormal and antalgic       Right Knee Exam     Inspection   Erythema: absent  Scars: present  Swelling: present  Effusion: present  Deformity: absent  Bruising: absent    Tenderness   The patient is experiencing no tenderness.     Range of Motion   Extension:  0   Flexion:  90     Other   Sensation: decreased    Comments:  Aquacel bandage removed. Incision healing well with no signs of infection or necrosis. No purulent drainage. NVI. Sutures tags trimmed.  Portal sutures removed.          RADIOGRAPHS: 11/29/23  Right knee:  Postop changes of right ACL reconstruction.  No immediate complication identified.     No evidence of a fracture or dislocation.    Assessment:     Encounter Diagnoses   Name Primary?     Acute pain of right knee     S/P ACL reconstruction Yes        Plan:     1. Provided patient with operative note.  2. Removed portal sutures. Suture tags trimmed. NO signs of infection or necrosis.  3. May shower now without covering incisions.  4. Continue  mg once a day.  5. Continue PT per protocol.  6. RTC in 4 weeks with Dr. Ryland Coffey for 6 week post op appt.  7. PHYSICAL THERAPY:  The patient should begin physical therapy on postoperative   day #1-3 and will be advanced to outpatient therapy as soon as   Possible following discharge.     Weight bearing:as tolerated  right leg  Range of Motion:Full normal motion symmetric to opposite side     Immediate specific exercises should include:   Gait program should include the above stated weightbearing; in addition: active extension with a heel-to-toe gait working on maintaining extension     Immobilizer if present should be locked @-10 degrees with gait and allowed to flex to 120 degrees at rest.   8. Reparel sleeve placed. Okay to transition to medial  brace at 4 weeks post op if SLR without extension leg and good quad strength. Medial  brace was fitted today.  9. Tramadol prescription sent to pharmacy. Continue to wean off pain medication.    Patient questionnaires may have been collected.

## 2023-11-30 ENCOUNTER — CLINICAL SUPPORT (OUTPATIENT)
Dept: REHABILITATION | Facility: HOSPITAL | Age: 53
End: 2023-11-30
Payer: COMMERCIAL

## 2023-11-30 DIAGNOSIS — M25.561 ACUTE PAIN OF RIGHT KNEE: Primary | ICD-10-CM

## 2023-11-30 PROCEDURE — 97140 MANUAL THERAPY 1/> REGIONS: CPT

## 2023-11-30 PROCEDURE — 97110 THERAPEUTIC EXERCISES: CPT

## 2023-11-30 PROCEDURE — 97112 NEUROMUSCULAR REEDUCATION: CPT

## 2023-11-30 NOTE — ANESTHESIA POSTPROCEDURE EVALUATION
Anesthesia Post Evaluation    Patient: Fredy Bo    Procedure(s) Performed: Procedure(s) (LRB):  RECONSTRUCTION, KNEE, ACL, ARTHROSCOPIC (Right)  ALL RECONSTRUCTION (Right)  ARTHROSCOPY, KNEE, WITH CHONDROPLASTY (Right)  LYSIS, ADHESIONS, KNEE, ARTHROSCOPIC (Right)  ARTHROSCOPY, KNEE, WITH MENISCECTOMY (Right)    Final Anesthesia Type: general      Patient location during evaluation: PACU  Patient participation: Yes- Able to Participate  Level of consciousness: awake and alert and oriented  Post-procedure vital signs: reviewed and stable  Pain management: adequate  Airway patency: patent    PONV status at discharge: No PONV  Anesthetic complications: no      Cardiovascular status: blood pressure returned to baseline and hemodynamically stable  Respiratory status: unassisted, room air and spontaneous ventilation  Hydration status: euvolemic  Follow-up not needed.              Vitals Value Taken Time   /67 11/09/23 1400   Temp 36.6 °C (97.9 °F) 11/09/23 1300   Pulse 79 11/09/23 1400   Resp 16 11/09/23 1431   SpO2 91 % 11/09/23 1400         Event Time   Out of Recovery 11/09/2023 13:55:00         Pain/Sharri Score: No data recorded

## 2023-11-30 NOTE — PROGRESS NOTES
[facility]   Physical Therapy Treatment Note      [id number]    Therapy Diagnosis:   Encounter Diagnosis   Name Primary?    Acute pain of right knee Yes     Physician: Ryland Coffey MD     Physician Orders: PT Eval and Treat ACL tear  Medical Diagnosis from Referral: Rupture of anterior cruciate ligament of right knee, initial encounter [S83.511A]  Evaluation Date: 11/1/2023  Authorization Period Expiration: 11/30/23  Plan of Care Expiration: 12/31/23  Progress Note Due: 12/31/23  Visit # / Visits authorized: 6/20   FOTO: 1/3    PROCEDURES(S) PERFORMED: 11/9/23  1. Right Arthroscopy, anterior cruciate ligament reconstruction 06338  2.  Right Arthroscopy, with meniscectomy (medial OR lateral) 45492, medial  3.  Right Arthroscopy, debridement/shaving of articular cartilage (chondroplasty) 08256  4.  Right Arthroscopy, with lysis of adhesions 40169  5.  Right Arthroscopy, knee, synovectomy, limited 30075     Precautions: Standard   PHYSICAL THERAPY:  The patient should begin physical therapy on postoperative   day #1-3 and will be advanced to outpatient therapy as soon as   Possible following discharge.     Weight bearing:as tolerated  right leg  Range of Motion:Full normal motion symmetric to opposite side  Immobilizer if present should be locked @-10 degrees with gait and allowed to flex to 120 degrees at rest.     Time In: 8:00 am   Time Out: 9:00 am   Total Billable Time: 60 minutes     Subjective     Patient reports:  had f/u w/ Prabha- PA, went well. Pt reports HEP going well walking around the house without a crutch    He was compliant with home exercise program.  Response to previous treatment: minimal soreness   Functional change: n/a, initial eval     Pain: 0/10     Location: right knee    Objective      POD #2 wks 6 days    Observation: Pt presents w/ single axillary crutch WBAT on R LE w/ t-scope locked in extension. Incisions healing  "well.    Range of Motion:   Knee Right Left   Active 0-100 0-137   Passive 0- 100 0-137     Function:    - quad set: Good  - SLR: no lag    Treatment     Fredy received the treatments listed below:     MANUAL THERAPY TECHNIQUES were applied for (10) minutes, including:  Patellar mobs all directions  Fat pad mob  Extension hinge stretch  Flexion off EOT     THERAPEUTIC EXERCISES to develop strength, endurance, ROM, flexibility, posture, and core stabilization for (15) minutes including:    Knee ext LLLD x5 min  Hinge stretch with strap 4x15"   Prone quad stretch 3x30"  Upright bike for 5 min for tissue extensibility/ pain modulation    NP today  HS strap stretch off EOT 2x30 sec  Gastroc strap stretch 2x30 sec  Heel slide 3x20      NEUROMUSCULAR RE-EDUCATION ACTIVITIES to improve Balance, Coordination, Kinesthetic, Sense, Proprioception, and Posture for (35) minutes.  The following were included:    Quad set 10" hold x20   SLR 3x10 3"  SL hip abduction 3x15  DL bridge 4x10  Retrowalking on treadmill 5% incline, 2.5 mph (dec to 0% grade over 10 min)    NP today  LAQ 3# 4x15  HS curl against MR 3x30       THERAPEUTIC ACTIVITIES to improve dynamic and functional  performance for (0) minutes including:      Patient Education and Home Exercises       Home Exercises Provided and Patient Education Provided     Education provided:   post op precautions, gait mechanics, POC, prognosis, return to activity, HEP     Written Home Exercises Provided: yes.  Exercises were reviewed and Fredy was able to demonstrate them prior to the end of the session.  Fredy demonstrated good  understanding of the education provided. See EMR under Patient Instructions for exercises provided during therapy sessions.    Assessment     Fredy with full knee extension, good quad tone. Pt tolerates bike well today reporting stretch but no pain. Initiated walking with t-scope unlocked today with retrowalking on treadmill. Will continue to progress " strength / ROM as tolerated.    Fredy is progressing well towards his goals.   Patient prognosis is Good.     Patient will continue to benefit from skilled outpatient physical therapy to address the deficits listed in the problem list box on initial evaluation, provide pt/family education and to maximize patient's level of independence in the home and community environment.     Patient's spiritual, cultural and educational needs considered and pt agreeable to plan of care and goals.     Anticipated barriers to physical therapy: work schedule      Goals:  Short Term Goals: 2 weeks     Pt will be independent with HEP and report compliance at least 4 days/week  Pt will demonstrate full passive knee extension and knee flexion to at least 90 deg  Pt will be able to perform 15 SLR without extension lag to demonstrate improved quad strength for gait    Mid term goals: within 12 weeks    Pt will be able to amb 30 min on level surface without gait deviations  Pt will be able to perform sit<>stand to standard chair x10 with equal weight shift  Pt will be able to perform all ADL's/ job responsibilities at PLOF without pain   Pt will demonstrate full symmetrical ROM on R vs L    Long term goals: with 9 months    Pt will demonstrate at least 90% quad strength on R vs L assessed on crane scale at 60 deg flexion  2.   Pt will demonstrate no greater than 4cm difference on anterior Y balance reach to demonstrate symmetrical LE function    Plan     Plan of care Certification: 11/13/2023 to 8/13/24.     Outpatient Physical Therapy 2 times weekly for 9 months to include the following interventions: Electrical Stimulation TENS/NMES, Gait Training, Manual Therapy, Moist Heat/ Ice, Neuromuscular Re-ed, Patient Education, Self Care, Therapeutic Activities, and Therapeutic Exercise.   Keagan Domínguez, PT, DPT

## 2023-12-05 ENCOUNTER — CLINICAL SUPPORT (OUTPATIENT)
Dept: REHABILITATION | Facility: HOSPITAL | Age: 53
End: 2023-12-05
Payer: COMMERCIAL

## 2023-12-05 DIAGNOSIS — M25.561 ACUTE PAIN OF RIGHT KNEE: Primary | ICD-10-CM

## 2023-12-05 PROCEDURE — 97110 THERAPEUTIC EXERCISES: CPT

## 2023-12-05 PROCEDURE — 97112 NEUROMUSCULAR REEDUCATION: CPT

## 2023-12-05 PROCEDURE — 97140 MANUAL THERAPY 1/> REGIONS: CPT

## 2023-12-05 NOTE — PROGRESS NOTES
[facility]   Physical Therapy Treatment Note      [id number]    Therapy Diagnosis:   Encounter Diagnosis   Name Primary?    Acute pain of right knee Yes     Physician: Ryland Coffey MD     Physician Orders: PT Eval and Treat ACL tear  Medical Diagnosis from Referral: Rupture of anterior cruciate ligament of right knee, initial encounter [S83.511A]  Evaluation Date: 11/1/2023  Authorization Period Expiration: 11/30/23  Plan of Care Expiration: 12/31/23  Progress Note Due: 12/31/23  Visit # / Visits authorized: 7/20   FOTO: 1/3    PROCEDURES(S) PERFORMED: 11/9/23  1. Right Arthroscopy, anterior cruciate ligament reconstruction 24358  2.  Right Arthroscopy, with meniscectomy (medial OR lateral) 91110, medial  3.  Right Arthroscopy, debridement/shaving of articular cartilage (chondroplasty) 09175  4.  Right Arthroscopy, with lysis of adhesions 96689  5.  Right Arthroscopy, knee, synovectomy, limited 77419     Precautions: Standard   PHYSICAL THERAPY:  The patient should begin physical therapy on postoperative   day #1-3 and will be advanced to outpatient therapy as soon as   Possible following discharge.     Weight bearing:as tolerated  right leg  Range of Motion:Full normal motion symmetric to opposite side  Immobilizer if present should be locked @-10 degrees with gait and allowed to flex to 120 degrees at rest.     Time In: 8:00 am   Time Out: 9:00 am   Total Billable Time: 60 minutes     Subjective     Patient reports:  he has been feeling good since last visit. Not using crutch around the house, tolerating well    He was compliant with home exercise program.  Response to previous treatment: minimal soreness   Functional change: n/a, initial eval     Pain: 0/10     Location: right knee    Objective      POD #3 wks 5 days    Observation: Pt presents w/ no AD WBAT on R LE w/ t-scope locked in extension. Incisions healing well.    Range of Motion:  "  Knee Right Left   Active 0-100 0-137   Passive 0- 110 0-137     Function:    - quad set: Good  - SLR: no lag    Treatment     Fredy received the treatments listed below:     MANUAL THERAPY TECHNIQUES were applied for (10) minutes, including:  Patellar mobs all directions  Fat pad mob  Extension hinge stretch  Flexion off EOT     THERAPEUTIC EXERCISES to develop strength, endurance, ROM, flexibility, posture, and core stabilization for (25) minutes including:    Upright bike for 6 min for tissue extensibility/ pain modulation  Knee ext LLLD 2x5 min  Hinge stretch with strap 4x15"   Prone quad stretch 3x30"  Heel slide 3x20    NP today  HS strap stretch off EOT 2x30 sec  Gastroc strap stretch 2x30 sec      NEUROMUSCULAR RE-EDUCATION ACTIVITIES to improve Balance, Coordination, Kinesthetic, Sense, Proprioception, and Posture for (25) minutes.  The following were included:    Quad set 10" hold x20   SLR 3x10 2#  HS curl against MR x30  DL bridge 4x10      NP today  LAQ 3# 4x15  SL hip abduction 3x15  Retrowalking on treadmill 5% incline, 2.5 mph (dec to 0% grade over 10 min)       THERAPEUTIC ACTIVITIES to improve dynamic and functional  performance for (0) minutes including:      Patient Education and Home Exercises       Home Exercises Provided and Patient Education Provided     Education provided:   post op precautions, gait mechanics, POC, prognosis, return to activity, HEP     Written Home Exercises Provided: yes.  Exercises were reviewed and Fredy was able to demonstrate them prior to the end of the session.  Fredy demonstrated good  understanding of the education provided. See EMR under Patient Instructions for exercises provided during therapy sessions.    Assessment     Fredy presents w/ good carryover of extension ROM, improved end feel following LLLD. Pt tolerates progressed flexion well, lateral knee tightness at ALL insertion at end range. Pt able to achieve quad stretch in prone. Pt instructed on updated " HEP. Will continue to progress ROM/ quad strength as tolerated.    Fredy is progressing well towards his goals.   Patient prognosis is Good.     Patient will continue to benefit from skilled outpatient physical therapy to address the deficits listed in the problem list box on initial evaluation, provide pt/family education and to maximize patient's level of independence in the home and community environment.     Patient's spiritual, cultural and educational needs considered and pt agreeable to plan of care and goals.     Anticipated barriers to physical therapy: work schedule      Goals:  Short Term Goals: 2 weeks     Pt will be independent with HEP and report compliance at least 4 days/week  Pt will demonstrate full passive knee extension and knee flexion to at least 90 deg  Pt will be able to perform 15 SLR without extension lag to demonstrate improved quad strength for gait    Mid term goals: within 12 weeks    Pt will be able to amb 30 min on level surface without gait deviations  Pt will be able to perform sit<>stand to standard chair x10 with equal weight shift  Pt will be able to perform all ADL's/ job responsibilities at PLOF without pain   Pt will demonstrate full symmetrical ROM on R vs L    Long term goals: with 9 months    Pt will demonstrate at least 90% quad strength on R vs L assessed on crane scale at 60 deg flexion  2.   Pt will demonstrate no greater than 4cm difference on anterior Y balance reach to demonstrate symmetrical LE function    Plan     Plan of care Certification: 11/13/2023 to 8/13/24.     Outpatient Physical Therapy 2 times weekly for 9 months to include the following interventions: Electrical Stimulation TENS/NMES, Gait Training, Manual Therapy, Moist Heat/ Ice, Neuromuscular Re-ed, Patient Education, Self Care, Therapeutic Activities, and Therapeutic Exercise.   Keagan Domínguez, PT, DPT      No

## 2023-12-07 ENCOUNTER — CLINICAL SUPPORT (OUTPATIENT)
Dept: REHABILITATION | Facility: HOSPITAL | Age: 53
End: 2023-12-07
Payer: COMMERCIAL

## 2023-12-07 DIAGNOSIS — M25.561 ACUTE PAIN OF RIGHT KNEE: Primary | ICD-10-CM

## 2023-12-07 PROCEDURE — 97112 NEUROMUSCULAR REEDUCATION: CPT

## 2023-12-07 PROCEDURE — 97530 THERAPEUTIC ACTIVITIES: CPT

## 2023-12-07 PROCEDURE — 97110 THERAPEUTIC EXERCISES: CPT

## 2023-12-07 NOTE — PROGRESS NOTES
[facility]   Physical Therapy Treatment Note      [id number]    Therapy Diagnosis:   Encounter Diagnosis   Name Primary?    Acute pain of right knee Yes     Physician: Ryland Coffey MD     Physician Orders: PT Eval and Treat ACL tear  Medical Diagnosis from Referral: Rupture of anterior cruciate ligament of right knee, initial encounter [S83.511A]  Evaluation Date: 11/1/2023  Authorization Period Expiration: 11/30/23  Plan of Care Expiration: 12/31/23  Progress Note Due: 12/31/23  Visit # / Visits authorized: 8/20   FOTO: 1/3    PROCEDURES(S) PERFORMED: 11/9/23  1. Right Arthroscopy, anterior cruciate ligament reconstruction 49814  2.  Right Arthroscopy, with meniscectomy (medial OR lateral) 96942, medial  3.  Right Arthroscopy, debridement/shaving of articular cartilage (chondroplasty) 65756  4.  Right Arthroscopy, with lysis of adhesions 48825  5.  Right Arthroscopy, knee, synovectomy, limited 16616     Precautions: Standard   PHYSICAL THERAPY:  The patient should begin physical therapy on postoperative   day #1-3 and will be advanced to outpatient therapy as soon as   Possible following discharge.     Weight bearing:as tolerated  right leg  Range of Motion:Full normal motion symmetric to opposite side  Immobilizer if present should be locked @-10 degrees with gait and allowed to flex to 120 degrees at rest.     Time In: 8:00 am   Time Out: 9:00 am   Total Billable Time: 60 minutes     Subjective     Patient reports: knee felt good after last visit. Pt reports he has been amb without crutch with no issues. Brought  brace today.    He was compliant with home exercise program.  Response to previous treatment: minimal soreness   Functional change: n/a, initial eval     Pain: 0/10     Location: right knee    Objective      POD #4 wks 0 days    Observation: Pt presents w/ no AD WBAT on R LE w/  brace. Incisions healing well.    Range of  "Motion:   Knee Right Left   Active 0-100 0-137   Passive 0- 117* 0-137   *measured in prone    Function:    - quad set: Good  - SLR: no lag    Treatment     Fredy received the treatments listed below:     MANUAL THERAPY TECHNIQUES were applied for (5) minutes, including:  Patellar mobs all directions  Fat pad mob  Extension hinge stretch  Flexion off EOT     THERAPEUTIC EXERCISES to develop strength, endurance, ROM, flexibility, posture, and core stabilization for (20) minutes including:    Upright bike for 6 min for tissue extensibility/ pain modulation  Knee ext LLLD 5# x5 min  Hinge stretch with strap 4x15"   Prone quad stretch 3x30"      NP today  Heel slide 3x20  HS strap stretch off EOT 2x30 sec  Gastroc strap stretch 2x30 sec      NEUROMUSCULAR RE-EDUCATION ACTIVITIES to improve Balance, Coordination, Kinesthetic, Sense, Proprioception, and Posture for (25) minutes.  The following were included:    Quad set 10" hold x20   SLR 3x10 2#  HS curl against MR x30  DL bridge 4x10    NP today  LAQ 3# 4x15  SL hip abduction 3x15  Retrowalking on treadmill 5% incline, 2.5 mph (dec to 0% grade over 10 min)       THERAPEUTIC ACTIVITIES to improve dynamic and functional  performance for (10) minutes including:    DL squats 4x10    Patient Education and Home Exercises       Home Exercises Provided and Patient Education Provided     Education provided:   post op precautions, gait mechanics, POC, prognosis, return to activity, HEP     Written Home Exercises Provided: yes.  Exercises were reviewed and Fredy was able to demonstrate them prior to the end of the session.  Fredy demonstrated good  understanding of the education provided. See EMR under Patient Instructions for exercises provided during therapy sessions.    Assessment     Fredy demonstrates improving flexion ROM today, tolerating prone quad stretch well. Pt's  brace adjusted for fit today. Tolerates progressed functional CKC strengthening with good " tolerance today. Will continue to progress flexion ROM/ strength as tolerated.    Fredy is progressing well towards his goals.   Patient prognosis is Good.     Patient will continue to benefit from skilled outpatient physical therapy to address the deficits listed in the problem list box on initial evaluation, provide pt/family education and to maximize patient's level of independence in the home and community environment.     Patient's spiritual, cultural and educational needs considered and pt agreeable to plan of care and goals.     Anticipated barriers to physical therapy: work schedule      Goals:  Short Term Goals: 2 weeks     Pt will be independent with HEP and report compliance at least 4 days/week  Pt will demonstrate full passive knee extension and knee flexion to at least 90 deg  Pt will be able to perform 15 SLR without extension lag to demonstrate improved quad strength for gait    Mid term goals: within 12 weeks    Pt will be able to amb 30 min on level surface without gait deviations  Pt will be able to perform sit<>stand to standard chair x10 with equal weight shift  Pt will be able to perform all ADL's/ job responsibilities at PLOF without pain   Pt will demonstrate full symmetrical ROM on R vs L    Long term goals: with 9 months    Pt will demonstrate at least 90% quad strength on R vs L assessed on crane scale at 60 deg flexion  2.   Pt will demonstrate no greater than 4cm difference on anterior Y balance reach to demonstrate symmetrical LE function    Plan     Plan of care Certification: 11/13/2023 to 8/13/24.     Outpatient Physical Therapy 2 times weekly for 9 months to include the following interventions: Electrical Stimulation TENS/NMES, Gait Training, Manual Therapy, Moist Heat/ Ice, Neuromuscular Re-ed, Patient Education, Self Care, Therapeutic Activities, and Therapeutic Exercise.   Keagan Domínguez, PT, DPT

## 2023-12-11 ENCOUNTER — CLINICAL SUPPORT (OUTPATIENT)
Dept: REHABILITATION | Facility: HOSPITAL | Age: 53
End: 2023-12-11
Payer: COMMERCIAL

## 2023-12-11 DIAGNOSIS — M25.561 ACUTE PAIN OF RIGHT KNEE: Primary | ICD-10-CM

## 2023-12-11 PROCEDURE — 97110 THERAPEUTIC EXERCISES: CPT

## 2023-12-11 PROCEDURE — 97530 THERAPEUTIC ACTIVITIES: CPT

## 2023-12-11 PROCEDURE — 97112 NEUROMUSCULAR REEDUCATION: CPT

## 2023-12-11 NOTE — PROGRESS NOTES
[facility]   Physical Therapy Treatment Note      [id number]    Therapy Diagnosis:   Encounter Diagnosis   Name Primary?    Acute pain of right knee Yes     Physician: Ryland Coffey MD     Physician Orders: PT Eval and Treat ACL tear  Medical Diagnosis from Referral: Rupture of anterior cruciate ligament of right knee, initial encounter [S83.511A]  Evaluation Date: 11/1/2023  Authorization Period Expiration: 11/30/23  Plan of Care Expiration: 12/31/23  Progress Note Due: 12/31/23  Visit # / Visits authorized: 9/20   FOTO: 1/3    PROCEDURES(S) PERFORMED: 11/9/23  1. Right Arthroscopy, anterior cruciate ligament reconstruction 03715  2.  Right Arthroscopy, with meniscectomy (medial OR lateral) 19213, medial  3.  Right Arthroscopy, debridement/shaving of articular cartilage (chondroplasty) 08144  4.  Right Arthroscopy, with lysis of adhesions 39698  5.  Right Arthroscopy, knee, synovectomy, limited 22767     Precautions: Standard   PHYSICAL THERAPY:  The patient should begin physical therapy on postoperative   day #1-3 and will be advanced to outpatient therapy as soon as   Possible following discharge.     Weight bearing:as tolerated  right leg  Range of Motion:Full normal motion symmetric to opposite side  Immobilizer if present should be locked @-10 degrees with gait and allowed to flex to 120 degrees at rest.     Time In: 10:00 am   Time Out: 11:00 am   Total Billable Time: 60 minutes     Subjective     Patient reports: no soreness, doing more walking over the weekend, tolerating well. Pt reports tolerating  brace well.     He was compliant with home exercise program.  Response to previous treatment: minimal soreness   Functional change: n/a, initial eval     Pain: 0/10     Location: right knee    Objective      POD #4 wks 0 days    Observation: Pt presents w/ no AD WBAT on R LE w/  brace. Incisions healing well.    Range of Motion:  "  Knee Right Left   Active 0-100 0-137   Passive 0- 124* 0-137   *measured in prone    Function:    - quad set: Good  - SLR: no lag    Treatment     Fredy received the treatments listed below:     MANUAL THERAPY TECHNIQUES were applied for (5) minutes, including:  Patellar mobs all directions  Fat pad mob  Extension hinge stretch  Flexion off EOT     THERAPEUTIC EXERCISES to develop strength, endurance, ROM, flexibility, posture, and core stabilization for (20) minutes including:    Upright bike for 6 min for tissue extensibility/ pain modulation  Knee ext LLLD 5# x5 min  Hinge stretch with strap 4x15"   Prone quad stretch 3x30"    NP today  Heel slide 3x20  HS strap stretch off EOT 2x30 sec  Gastroc strap stretch 2x30 sec    NEUROMUSCULAR RE-EDUCATION ACTIVITIES to improve Balance, Coordination, Kinesthetic, Sense, Proprioception, and Posture for (25) minutes.  The following were included:    Quad set 10" hold x20   SLR 3x10 2#  SL hip abduction 2# 3x15  SL bridge 4x10    NP today  LAQ 3# 4x15  HS curl against MR x30  Retrowalking on treadmill 5% incline, 2.5 mph (dec to 0% grade over 10 min)    THERAPEUTIC ACTIVITIES to improve dynamic and functional  performance for (12) minutes including:    DL squats 3x10  5" step up 3x10    Patient Education and Home Exercises       Home Exercises Provided and Patient Education Provided     Education provided:   post op precautions, gait mechanics, POC, prognosis, return to activity, HEP     Written Home Exercises Provided: yes.  Exercises were reviewed and Fredy was able to demonstrate them prior to the end of the session.  Fredy demonstrated good  understanding of the education provided. See EMR under Patient Instructions for exercises provided during therapy sessions.    Assessment     Fredy presents w/ good carryover of ROM, progressed flexion in prone today w/ good tolerance. Pt tolerates progressed strengthening well today. Will continue to progress flexion ROM / quad " strength as tolerated.    Fredy is progressing well towards his goals.   Patient prognosis is Good.     Patient will continue to benefit from skilled outpatient physical therapy to address the deficits listed in the problem list box on initial evaluation, provide pt/family education and to maximize patient's level of independence in the home and community environment.     Patient's spiritual, cultural and educational needs considered and pt agreeable to plan of care and goals.     Anticipated barriers to physical therapy: work schedule      Goals:  Short Term Goals: 2 weeks     Pt will be independent with HEP and report compliance at least 4 days/week  Pt will demonstrate full passive knee extension and knee flexion to at least 90 deg  Pt will be able to perform 15 SLR without extension lag to demonstrate improved quad strength for gait    Mid term goals: within 12 weeks    Pt will be able to amb 30 min on level surface without gait deviations  Pt will be able to perform sit<>stand to standard chair x10 with equal weight shift  Pt will be able to perform all ADL's/ job responsibilities at PLOF without pain   Pt will demonstrate full symmetrical ROM on R vs L    Long term goals: with 9 months    Pt will demonstrate at least 90% quad strength on R vs L assessed on crane scale at 60 deg flexion  2.   Pt will demonstrate no greater than 4cm difference on anterior Y balance reach to demonstrate symmetrical LE function    Plan     Plan of care Certification: 11/13/2023 to 8/13/24.     Keagan Domínguez, PT, DPT

## 2023-12-12 NOTE — PROGRESS NOTES
Subjective:     Chief Complaint: Fredy Bo is a 53 y.o. male who had no chief complaint listed for this encounter.    Patient presents to clinic for 2 week post op evaluation of Right knee.  He has been attending formal PT at [facility] 2x a week. He has been taking ASA 325mg once daily for DVT prophylaxis. Doing well. Ambulating with one crutch and T-scope brace locked in extension with gait WBAT.    Date of Procedure: 11/9/2023      Procedure: 1. Right  Arthroscopy, anterior cruciate ligament reconstruction 29923  2. Right   21976 Ligament knee reconstruction, extra-articular  2.  Right  Arthroscopy, with meniscectomy (medial OR lateral) 96966, medial  3.  Right  Arthroscopy, debridement/shaving of articular cartilage (chondroplasty) 51938  4.  Right  Arthroscopy, with lysis of adhesions 49122  5.  Right  Arthroscopy, knee, synovectomy, limited 81591     Surgeon(s) and Role:     * Ryland Coffey MD - Primary     Assisting Surgeon:      MD Indira Sexton  [facility]          Review of Systems   Constitutional: Negative. Negative for chills, fever, night sweats, weight gain and weight loss.   HENT:  Negative for congestion, hearing loss and sore throat.    Eyes:  Negative for blurred vision, double vision and visual disturbance.   Cardiovascular:  Negative for chest pain, leg swelling and palpitations.   Respiratory:  Negative for cough and shortness of breath.    Endocrine: Negative for polyuria.   Hematologic/Lymphatic: Negative for bleeding problem. Does not bruise/bleed easily.   Skin:  Negative for itching, poor wound healing and rash.   Musculoskeletal:  Positive for joint pain, joint swelling and stiffness. Negative for back pain, muscle cramps, muscle weakness and myalgias.   Gastrointestinal:  Negative for abdominal pain, constipation, diarrhea, melena, nausea and vomiting.   Genitourinary: Negative.  Negative for frequency and hematuria.   Neurological:  Negative for dizziness,  headaches, loss of balance, numbness, paresthesias and sensory change.   Psychiatric/Behavioral:  Negative for altered mental status and depression. The patient is not nervous/anxious.    Allergic/Immunologic: Negative for hives.       Pain Related Questions  Over the past 3 days, what was your highest pain level?: 4  Over the past 3 days, what was your lowest pain level? : 2    Other  How many nights a week are you awakened by your affected body part?: 3  Was the patient's HEIGHT measured or patient reported?: Patient Reported  Was the patient's WEIGHT measured or patient reported?: Measured    Objective:     General: Fredy is well-developed, well-nourished, appears stated age, in no acute distress, alert and oriented to time, place and person.     General    Vitals reviewed.  Constitutional: He is oriented to person, place, and time. He appears well-developed and well-nourished. No distress.   HENT:   Mouth/Throat: No oropharyngeal exudate.   Eyes: Right eye exhibits no discharge. Left eye exhibits no discharge.   Cardiovascular:  Normal rate and regular rhythm.            Pulmonary/Chest: Effort normal and breath sounds normal. No respiratory distress.   Neurological: He is alert and oriented to person, place, and time. He has normal reflexes. No cranial nerve deficit. Coordination normal.   Psychiatric: He has a normal mood and affect. His behavior is normal. Judgment and thought content normal.     General Musculoskeletal Exam   Gait: normal       Right Knee Exam     Inspection   Erythema: absent  Scars: present  Swelling: present  Effusion: absent  Deformity: absent  Bruising: absent    Tenderness   The patient is experiencing no tenderness.     Range of Motion   Extension:  0   Flexion:  130     Tests   Meniscus   Mikel:  Medial - negative Lateral - negative  Ligament Examination   Lachman: normal (-1 to 2mm)   PCL-Posterior Drawer: normal (0 to 2mm)     MCL - Valgus: normal (0 to 2mm)  LCL - Varus:  normal  Pivot Shift: normal (Equal)  Reverse Pivot Shift: normal (Equal)  Dial Test at 30 degrees: normal (< 5 degrees)  Dial Test at 90 degrees: normal (< 5 degrees)  Posterior Sag Test: negative  Posterolateral Corner: stable  Patella   Patellar apprehension: negative  Passive Patellar Tilt: neutral  Patellar Tracking: normal  Patellar Glide (quadrants): Lateral - 1   Medial - 2  Q-Angle at 90 degrees: normal  Patellar Grind: negative  J-Sign: none    Other   Meniscal Cyst: absent  Popliteal (Baker's) Cyst: absent  Sensation: decreased    Comments:        Left Knee Exam     Inspection   Erythema: absent  Scars: absent  Swelling: absent  Effusion: absent  Deformity: absent  Bruising: absent    Tenderness   The patient is experiencing no tenderness.     Range of Motion   Extension:  0   Flexion:  130     Tests   Meniscus   Mikel:  Medial - negative Lateral - negative  Stability   Lachman: normal (-1 to 2mm)   PCL-Posterior Drawer: normal (0 to 2mm)  MCL - Valgus: normal (0 to 2mm)  LCL - Varus: normal (0 to 2mm)  Pivot Shift: normal (Equal)  Reverse Pivot Shift: normal (Equal)  Dial Test at 30 degrees: normal (< 5 degrees)  Dial Test at 90 degrees: normal (< 5 degrees)  Posterior Sag Test: negative  Posterolateral Corner: stable  Patella   Patellar apprehension: negative  Passive Patellar Tilt: neutral  Patellar Tracking: normal  Patellar Glide (Quadrants): Lateral - 1 Medial - 2  Q-Angle at 90 degrees: normal  Patellar Grind: negative  J-Sign: J sign absent    Other   Meniscal Cyst: absent  Popliteal (Baker's) Cyst: absent  Sensation: normal    Right Hip Exam     Tests   Justine: negative  Left Hip Exam     Tests   Justine: negative          Reflexes     Left Side  Achilles:  2+  Quadriceps:  2+    Right Side   Achilles:  2+  Quadriceps:  2+    Vascular Exam     Right Pulses  Dorsalis Pedis:      2+  Posterior Tibial:      2+        Left Pulses  Dorsalis Pedis:      2+  Posterior Tibial:      2+          RADIOGRAPHS:  11/29/23  Right knee:  Postop changes of right ACL reconstruction.  No immediate complication identified.     No evidence of a fracture or dislocation.    Assessment:     Encounter Diagnoses   Name Primary?    Right knee pain, unspecified chronicity Yes    Rupture of anterior cruciate ligament of right knee, sequela     Chronic pain of right knee     Old complex tear of lateral meniscus of right knee           Plan:     1. RTC in 6 weeks with Dr. Ryland Coffey. IKDC, SF-12 and KOOS was filled out today in clinic. Patient will fill out IKDC, SF-12 and KOOS on return.    2. Medications: Refills of the following Rx were sent to patients preferred Pharmacy:  celecoxib (CELEBREX) 200 MG capsule    3. Physical Therapy: Continue/Begin: Continue at [place] with Keagan     4. HEP: N/A    5. Procedures/Procedural Planning:   N/A    6. DME: N/A    7. Work/Sport Status: will return to light duty [employer],2024    8. Visit Summary: Tramadol and celebrex refilled today. New orders for physical therapy to continue with Keagan have been placed. Gave letter for work to return light duty on [employer]         Patient questionnaires may have been collected.

## 2023-12-18 ENCOUNTER — OFFICE VISIT (OUTPATIENT)
Dept: SPORTS MEDICINE | Facility: CLINIC | Age: 53
End: 2023-12-18
Payer: COMMERCIAL

## 2023-12-18 ENCOUNTER — HOSPITAL ENCOUNTER (OUTPATIENT)
Dept: RADIOLOGY | Facility: HOSPITAL | Age: 53
Discharge: HOME | End: 2023-12-18
Attending: ORTHOPAEDIC SURGERY
Payer: COMMERCIAL

## 2023-12-18 VITALS
WEIGHT: 238.13 LBS | DIASTOLIC BLOOD PRESSURE: 84 MMHG | SYSTOLIC BLOOD PRESSURE: 128 MMHG | BODY MASS INDEX: 34.09 KG/M2 | HEART RATE: 97 BPM | HEIGHT: 70 IN

## 2023-12-18 DIAGNOSIS — M25.561 RIGHT KNEE PAIN, UNSPECIFIED CHRONICITY: ICD-10-CM

## 2023-12-18 DIAGNOSIS — M23.200 OLD COMPLEX TEAR OF LATERAL MENISCUS OF RIGHT KNEE: ICD-10-CM

## 2023-12-18 DIAGNOSIS — M25.561 CHRONIC PAIN OF RIGHT KNEE: ICD-10-CM

## 2023-12-18 DIAGNOSIS — M23.200 OTHER OLD TEAR OF LATERAL MENISCUS OF RIGHT KNEE: ICD-10-CM

## 2023-12-18 DIAGNOSIS — G89.29 CHRONIC PAIN OF RIGHT KNEE: ICD-10-CM

## 2023-12-18 DIAGNOSIS — S83.511D RUPTURE OF ANTERIOR CRUCIATE LIGAMENT OF RIGHT KNEE, SUBSEQUENT ENCOUNTER: ICD-10-CM

## 2023-12-18 DIAGNOSIS — M23.91 KNEE INTERNAL DERANGEMENT, RIGHT: ICD-10-CM

## 2023-12-18 DIAGNOSIS — M25.561 RIGHT KNEE PAIN, UNSPECIFIED CHRONICITY: Primary | ICD-10-CM

## 2023-12-18 DIAGNOSIS — Z98.890 S/P ACL RECONSTRUCTION: ICD-10-CM

## 2023-12-18 DIAGNOSIS — S83.511S RUPTURE OF ANTERIOR CRUCIATE LIGAMENT OF RIGHT KNEE, SEQUELA: ICD-10-CM

## 2023-12-18 PROCEDURE — 3079F DIAST BP 80-89 MM HG: CPT | Mod: CPTII,S$GLB,, | Performed by: ORTHOPAEDIC SURGERY

## 2023-12-18 PROCEDURE — 4010F PR ACE/ARB THEARPY RXD/TAKEN: ICD-10-PCS | Mod: CPTII,S$GLB,, | Performed by: ORTHOPAEDIC SURGERY

## 2023-12-18 PROCEDURE — 73564 X-RAY EXAM KNEE 4 OR MORE: CPT | Mod: 26,,, | Performed by: RADIOLOGY

## 2023-12-18 PROCEDURE — 1159F MED LIST DOCD IN RCRD: CPT | Mod: CPTII,S$GLB,, | Performed by: ORTHOPAEDIC SURGERY

## 2023-12-18 PROCEDURE — 99024 POSTOP FOLLOW-UP VISIT: CPT | Mod: S$GLB,,, | Performed by: ORTHOPAEDIC SURGERY

## 2023-12-18 PROCEDURE — 99024 PR POST-OP FOLLOW-UP VISIT: ICD-10-PCS | Mod: S$GLB,,, | Performed by: ORTHOPAEDIC SURGERY

## 2023-12-18 PROCEDURE — 3079F PR MOST RECENT DIASTOLIC BLOOD PRESSURE 80-89 MM HG: ICD-10-PCS | Mod: CPTII,S$GLB,, | Performed by: ORTHOPAEDIC SURGERY

## 2023-12-18 PROCEDURE — 1160F PR REVIEW ALL MEDS BY PRESCRIBER/CLIN PHARMACIST DOCUMENTED: ICD-10-PCS | Mod: CPTII,S$GLB,, | Performed by: ORTHOPAEDIC SURGERY

## 2023-12-18 PROCEDURE — 99999 PR PBB SHADOW E&M-EST. PATIENT-LVL IV: ICD-10-PCS | Mod: PBBFAC,,, | Performed by: ORTHOPAEDIC SURGERY

## 2023-12-18 PROCEDURE — 1159F PR MEDICATION LIST DOCUMENTED IN MEDICAL RECORD: ICD-10-PCS | Mod: CPTII,S$GLB,, | Performed by: ORTHOPAEDIC SURGERY

## 2023-12-18 PROCEDURE — 73564 X-RAY EXAM KNEE 4 OR MORE: CPT | Mod: TC,50

## 2023-12-18 PROCEDURE — 73564 XR KNEE ORTHO BILAT WITH FLEXION: ICD-10-PCS | Mod: 26,,, | Performed by: RADIOLOGY

## 2023-12-18 PROCEDURE — 4010F ACE/ARB THERAPY RXD/TAKEN: CPT | Mod: CPTII,S$GLB,, | Performed by: ORTHOPAEDIC SURGERY

## 2023-12-18 PROCEDURE — 1160F RVW MEDS BY RX/DR IN RCRD: CPT | Mod: CPTII,S$GLB,, | Performed by: ORTHOPAEDIC SURGERY

## 2023-12-18 PROCEDURE — 3074F SYST BP LT 130 MM HG: CPT | Mod: CPTII,S$GLB,, | Performed by: ORTHOPAEDIC SURGERY

## 2023-12-18 PROCEDURE — 3074F PR MOST RECENT SYSTOLIC BLOOD PRESSURE < 130 MM HG: ICD-10-PCS | Mod: CPTII,S$GLB,, | Performed by: ORTHOPAEDIC SURGERY

## 2023-12-18 PROCEDURE — 99999 PR PBB SHADOW E&M-EST. PATIENT-LVL IV: CPT | Mod: PBBFAC,,, | Performed by: ORTHOPAEDIC SURGERY

## 2023-12-18 RX ORDER — TRAMADOL HYDROCHLORIDE 50 MG/1
50 TABLET ORAL
Qty: 31 TABLET | Refills: 0 | Status: SHIPPED | OUTPATIENT
Start: 2023-12-18

## 2023-12-18 RX ORDER — CELECOXIB 200 MG/1
200 CAPSULE ORAL 2 TIMES DAILY
Qty: 60 CAPSULE | Refills: 6 | Status: SHIPPED | OUTPATIENT
Start: 2023-12-18 | End: 2024-07-15

## 2023-12-18 NOTE — LETTER
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 Even though the weight lifted may be only a negligible amount, a job is in this category when it requires walking or standing to a significant degree, or when it involves sitting most of the time with a degree of pushing and pulling of arm and/or leg controls.    Therapy Recomendations: Physical Therapy 1 time a week for 6 weeks.    Medications Ordered This Encounter   Medications    celecoxib (CELEBREX) 200 MG capsule    traMADoL (ULTRAM) 50 mg tablet       Next Appointment: No follow-ups on file. Fredy will be seen by Dr. Ryland Coffey.    Comments:       __ __________________________         December 18, 2023    Ryland Coffey MD  Signed (Provider)

## 2023-12-20 ENCOUNTER — CLINICAL SUPPORT (OUTPATIENT)
Dept: REHABILITATION | Facility: HOSPITAL | Age: 53
End: 2023-12-20
Payer: COMMERCIAL

## 2023-12-20 DIAGNOSIS — M25.561 ACUTE PAIN OF RIGHT KNEE: Primary | ICD-10-CM

## 2023-12-20 DIAGNOSIS — S83.511S RUPTURE OF ANTERIOR CRUCIATE LIGAMENT OF RIGHT KNEE, SEQUELA: ICD-10-CM

## 2023-12-20 PROCEDURE — 97110 THERAPEUTIC EXERCISES: CPT

## 2023-12-20 PROCEDURE — 97112 NEUROMUSCULAR REEDUCATION: CPT

## 2023-12-20 PROCEDURE — 97530 THERAPEUTIC ACTIVITIES: CPT

## 2023-12-20 NOTE — PROGRESS NOTES
[facility]   Physical Therapy Treatment Note      Name: Fredy [facility] Number: [id number]    Therapy Diagnosis:   Encounter Diagnoses   Name Primary?    Rupture of anterior cruciate ligament of right knee, sequela     Acute pain of right knee Yes     Physician: Ryland Coffey MD     Physician Orders: PT Eval and Treat ACL tear  Medical Diagnosis from Referral: Rupture of anterior cruciate ligament of right knee, initial encounter [S83.511A]  Evaluation Date: 11/1/2023  Authorization Period Expiration: 11/30/23  Plan of Care Expiration: 12/31/23  Progress Note Due: 12/31/23  Visit # / Visits authorized: 10/20   FOTO: 1/3    PROCEDURES(S) PERFORMED: 11/9/23  1. Right Arthroscopy, anterior cruciate ligament reconstruction 94134  2.  Right Arthroscopy, with meniscectomy (medial OR lateral) 68732, medial  3.  Right Arthroscopy, debridement/shaving of articular cartilage (chondroplasty) 35162  4.  Right Arthroscopy, with lysis of adhesions 77143  5.  Right Arthroscopy, knee, synovectomy, limited 80542     Precautions: Standard   PHYSICAL THERAPY:  The patient should begin physical therapy on postoperative   day #1-3 and will be advanced to outpatient therapy as soon as   Possible following discharge.     Weight bearing:as tolerated  right leg  Range of Motion:Full normal motion symmetric to opposite side  Immobilizer if present should be locked @-10 degrees with gait and allowed to flex to 120 degrees at rest.     Time In: 11:00 am   Time Out: 12:00 am   Total Billable Time: 60 minutes     Subjective     Patient reports: had f/u with Dr. Coffey, went well. Pt cleared to return light duty at 3 weeks    He was compliant with home exercise program.  Response to previous treatment: minimal soreness   Functional change: n/a, initial eval     Pain: 0/10     Location: right knee    Objective      POD #6 wks 0 days    Observation: Pt presents w/ no AD WBAT on R LE w/  brace. Incisions  "healing well.    Range of Motion:   Knee Right Left   Active 0-100 0-137   Passive 0- 125 heel slide 0-137   128 measured in prone    Function:    - quad set: Good  - SLR: no lag    Treatment     Fredy received the treatments listed below:     MANUAL THERAPY TECHNIQUES were applied for (6) minutes, including:  Patellar mobs all directions  Fat pad mob  Extension hinge stretch  Flexion off EOT     THERAPEUTIC EXERCISES to develop strength, endurance, ROM, flexibility, posture, and core stabilization for (14) minutes including:    Heel slide 3x20  Hinge stretch with strap 4x15"   Prone quad stretch 3x30"    NP today  Knee ext LLLD 5# x5 min  HS strap stretch off EOT 2x30 sec  Gastroc strap stretch 2x30 sec    NEUROMUSCULAR RE-EDUCATION ACTIVITIES to improve Balance, Coordination, Kinesthetic, Sense, Proprioception, and Posture for (28) minutes.  The following were included:    Quad set 10" hold x20   SLR 3x10 2#  SL hip abduction 2# 3x15  Knee ext machine 25# 4x10-15    NP today  LAQ 3# 4x15  SL bridge 4x10  HS curl against MR x30  Retrowalking on treadmill 5% incline, 2.5 mph (dec to 0% grade over 10 min)    THERAPEUTIC ACTIVITIES to improve dynamic and functional  performance for (12) minutes including:    Upright bike for 6 min for LE endurance  6" step up 3x10    Patient Education and Home Exercises       Home Exercises Provided and Patient Education Provided     Education provided:   post op precautions, gait mechanics, POC, prognosis, return to activity, HEP     Written Home Exercises Provided: yes.  Exercises were reviewed and Fredy was able to demonstrate them prior to the end of the session.  Fredy demonstrated good  understanding of the education provided. See EMR under Patient Instructions for exercises provided during therapy sessions.    Assessment     Fredy with ROM approaching symmetry. Pt tolerates progressed open chain strengthening well reporting training effect in quad, no pain. Pt instructed to " continue progressing amb to better tolerate return to work. Will continue to progress ROM/ quad tone as tolerated.    Fredy is progressing well towards his goals.   Patient prognosis is Good.     Patient will continue to benefit from skilled outpatient physical therapy to address the deficits listed in the problem list box on initial evaluation, provide pt/family education and to maximize patient's level of independence in the home and community environment.     Patient's spiritual, cultural and educational needs considered and pt agreeable to plan of care and goals.     Anticipated barriers to physical therapy: work schedule      Goals:  Short Term Goals: 2 weeks     Pt will be independent with HEP and report compliance at least 4 days/week  Pt will demonstrate full passive knee extension and knee flexion to at least 90 deg  Pt will be able to perform 15 SLR without extension lag to demonstrate improved quad strength for gait    Mid term goals: within 12 weeks    Pt will be able to amb 30 min on level surface without gait deviations  Pt will be able to perform sit<>stand to standard chair x10 with equal weight shift  Pt will be able to perform all ADL's/ job responsibilities at PLOF without pain   Pt will demonstrate full symmetrical ROM on R vs L    Long term goals: with 9 months    Pt will demonstrate at least 90% quad strength on R vs L assessed on crane scale at 60 deg flexion  2.   Pt will demonstrate no greater than 4cm difference on anterior Y balance reach to demonstrate symmetrical LE function    Plan     Plan of care Certification: 11/13/2023 to 8/13/24.     Keagan Domínguez, PT, DPT

## 2023-12-28 ENCOUNTER — CLINICAL SUPPORT (OUTPATIENT)
Dept: REHABILITATION | Facility: HOSPITAL | Age: 53
End: 2023-12-28
Payer: COMMERCIAL

## 2023-12-28 DIAGNOSIS — M25.561 ACUTE PAIN OF RIGHT KNEE: Primary | ICD-10-CM

## 2023-12-28 PROCEDURE — 97112 NEUROMUSCULAR REEDUCATION: CPT

## 2023-12-28 PROCEDURE — 97110 THERAPEUTIC EXERCISES: CPT

## 2023-12-28 PROCEDURE — 97530 THERAPEUTIC ACTIVITIES: CPT

## 2023-12-28 NOTE — PROGRESS NOTES
[facility]   Physical Therapy Treatment Note      [id number]    Therapy Diagnosis:   Encounter Diagnosis   Name Primary?    Acute pain of right knee Yes     Physician: Ryland Coffey MD     Physician Orders: PT Eval and Treat ACL tear  Medical Diagnosis from Referral: Rupture of anterior cruciate ligament of right knee, initial encounter [S83.511A]  Evaluation Date: 11/1/2023  Authorization Period Expiration: 11/30/23  Plan of Care Expiration: 12/31/23  Progress Note Due: 12/31/23  Visit # / Visits authorized: 11/20   FOTO: 1/3    PROCEDURES(S) PERFORMED: 11/9/23  1. Right Arthroscopy, anterior cruciate ligament reconstruction 39621  2.  Right Arthroscopy, with meniscectomy (medial OR lateral) 97017, medial  3.  Right Arthroscopy, debridement/shaving of articular cartilage (chondroplasty) 34189  4.  Right Arthroscopy, with lysis of adhesions 78969  5.  Right Arthroscopy, knee, synovectomy, limited 30216     Precautions: Standard   PHYSICAL THERAPY:  The patient should begin physical therapy on postoperative   day #1-3 and will be advanced to outpatient therapy as soon as   Possible following discharge.     Weight bearing:as tolerated  right leg  Range of Motion:Full normal motion symmetric to opposite side  Immobilizer if present should be locked @-10 degrees with gait and allowed to flex to 120 degrees at rest.     Time In: 9:00 am   Time Out: 10:00 am   Total Billable Time: 60 minutes     Subjective     Patient reports: knee is doing well, only has discomfort with end range knee flexion.     He was compliant with home exercise program.  Response to previous treatment: minimal soreness   Functional change: improved ambulation      Pain: 0/10     Location: right knee    Objective      POD #7 wks 0 days    Observation: Pt presents w/ no AD WBAT on R LE w/  brace. Incisions healing well.    Range of Motion:   Knee Right Left   Active 0-125  "0-137   Passive 0- 135 heel slide 0-137   128 measured in prone    Function:    - quad set: Good  - SLR: no lag    Treatment     Fredy received the treatments listed below:     MANUAL THERAPY TECHNIQUES were applied for (5) minutes, including:  Patellar mobs all directions  Fat pad mob  Range of motion check     Not performed today:   Extension hinge stretch  Flexion off EOT     THERAPEUTIC EXERCISES to develop strength, endurance, ROM, flexibility, posture, and core stabilization for (22) minutes including:    Heel slide 2x20  Hinge stretch with strap 4x15"   Prone quad stretch 3x30"  DL Press 80# 3x12     NP today  Knee ext LLLD 5# x5 min  HS strap stretch off EOT 2x30 sec  Gastroc strap stretch 2x30 sec    NEUROMUSCULAR RE-EDUCATION ACTIVITIES to improve Balance, Coordination, Kinesthetic, Sense, Proprioception, and Posture for (18) minutes.  The following were included:    SLR in long sitting 3x10 2#  SL hip abduction 2# 3x15  Knee ext machine 25# 4x10    NP today  Quad set 10" hold x20   LAQ 3# 4x15  SL bridge 4x10  HS curl against MR x30  Retrowalking on treadmill 5% incline, 2.5 mph (dec to 0% grade over 10 min)    THERAPEUTIC ACTIVITIES to improve dynamic and functional  performance for (15) minutes including:    Upright bike for 7 min for LE endurance  6" step up 3x10 w/ 10#   3" lateral step down 3x10     Patient Education and Home Exercises       Home Exercises Provided and Patient Education Provided     Education provided:   post op precautions, gait mechanics, POC, prognosis, return to activity, HEP     Written Home Exercises Provided: yes.  Exercises were reviewed and Fredy was able to demonstrate them prior to the end of the session.  Fredy demonstrated good  understanding of the education provided. See EMR under Patient Instructions for exercises provided during therapy sessions.    Assessment     Fredy demonstrates improved knee flexion today active and passively with no reports of knee discomfort. " He tolerates progressions in open and closed chain strengthening well. No compensations noted with exercises performed today. Appropriate training effect for quad and SL was achieved. Will continue to progress ROM/ quad tone as tolerated.    Fredy is progressing well towards his goals.   Patient prognosis is Good.     Patient will continue to benefit from skilled outpatient physical therapy to address the deficits listed in the problem list box on initial evaluation, provide pt/family education and to maximize patient's level of independence in the home and community environment.     Patient's spiritual, cultural and educational needs considered and pt agreeable to plan of care and goals.     Anticipated barriers to physical therapy: work schedule      Goals:  Short Term Goals: 2 weeks     Pt will be independent with HEP and report compliance at least 4 days/week  Pt will demonstrate full passive knee extension and knee flexion to at least 90 deg  Pt will be able to perform 15 SLR without extension lag to demonstrate improved quad strength for gait    Mid term goals: within 12 weeks    Pt will be able to amb 30 min on level surface without gait deviations  Pt will be able to perform sit<>stand to standard chair x10 with equal weight shift  Pt will be able to perform all ADL's/ job responsibilities at PLOF without pain   Pt will demonstrate full symmetrical ROM on R vs L    Long term goals: with 9 months    Pt will demonstrate at least 90% quad strength on R vs L assessed on crane scale at 60 deg flexion  2.   Pt will demonstrate no greater than 4cm difference on anterior Y balance reach to demonstrate symmetrical LE function    Plan     Plan of care Certification: 11/13/2023 to 8/13/24.     Keagan Domínguez, PT, DPT

## 2024-01-02 ENCOUNTER — CLINICAL SUPPORT (OUTPATIENT)
Dept: REHABILITATION | Facility: HOSPITAL | Age: 54
End: 2024-01-02
Payer: COMMERCIAL

## 2024-01-02 DIAGNOSIS — M25.561 ACUTE PAIN OF RIGHT KNEE: Primary | ICD-10-CM

## 2024-01-02 PROCEDURE — 97530 THERAPEUTIC ACTIVITIES: CPT | Performed by: PHYSICAL THERAPIST

## 2024-01-02 PROCEDURE — 97112 NEUROMUSCULAR REEDUCATION: CPT | Performed by: PHYSICAL THERAPIST

## 2024-01-02 PROCEDURE — 97110 THERAPEUTIC EXERCISES: CPT | Performed by: PHYSICAL THERAPIST

## 2024-01-02 PROCEDURE — 97140 MANUAL THERAPY 1/> REGIONS: CPT | Performed by: PHYSICAL THERAPIST

## 2024-01-02 NOTE — PROGRESS NOTES
[facility]   Physical Therapy Treatment Note      [id number]    Therapy Diagnosis:   Encounter Diagnosis   Name Primary?    Acute pain of right knee Yes     Physician: Ryland Coffey MD     Physician Orders: PT Eval and Treat ACL tear  Medical Diagnosis from Referral: Rupture of anterior cruciate ligament of right knee, initial encounter [S83.511A]  Evaluation Date: 11/1/2023  Authorization Period Expiration: 12/31/23  Plan of Care Expiration: extend to 8/30/23  Progress Note Due: 4/1/24  Visit # / Visits authorized: 1/20   FOTO: 1/3    PROCEDURES(S) PERFORMED: 11/9/23  1. Right Arthroscopy, anterior cruciate ligament reconstruction 60616  2.  Right Arthroscopy, with meniscectomy (medial OR lateral) 94574, medial  3.  Right Arthroscopy, debridement/shaving of articular cartilage (chondroplasty) 34720  4.  Right Arthroscopy, with lysis of adhesions 55105  5.  Right Arthroscopy, knee, synovectomy, limited 08742     Precautions: Standard   PHYSICAL THERAPY:  The patient should begin physical therapy on postoperative   day #1-3 and will be advanced to outpatient therapy as soon as   Possible following discharge.     Weight bearing:as tolerated  right leg  Range of Motion:Full normal motion symmetric to opposite side  Immobilizer if present should be locked @-10 degrees with gait and allowed to flex to 120 degrees at rest.     Time In: 2:00 am   Time Out: 3:00 am   Total Billable Time: 60 minutes     Subjective     Patient reports: knee has been feeling good. Pt reports he returns to work tomorrow on light duty    He was compliant with home exercise program.  Response to previous treatment: minimal soreness   Functional change: improved ambulation      Pain: 0/10     Location: right knee    Objective      POD #7 wks 5 days    Observation: Pt presents w/ no AD WBAT on R LE w/  brace. Incisions healing well.    Range of Motion:   Knee Right Left  "  Active 0-125 0-137   Passive 0- 135 heel slide 0-137   128 measured in prone    Function:    - quad set: Good  - SLR: no lag    Treatment     Fredy received the treatments listed below:     MANUAL THERAPY TECHNIQUES were applied for (10) minutes, including:  Patellar mobs all directions  Fat pad mob  Range of motion check   Extension hinge stretch  Lateral tibial glide Gr IV    Not performed today:     Flexion off EOT     THERAPEUTIC EXERCISES to develop strength, endurance, ROM, flexibility, posture, and core stabilization for (20) minutes including:    Hinge stretch with strap 4x15"   Prone quad stretch 3x30"    NP today  Heel slide 2x20  Knee ext LLLD 5# x5 min  HS strap stretch off EOT 2x30 sec  Gastroc strap stretch 2x30 sec    NEUROMUSCULAR RE-EDUCATION ACTIVITIES to improve Balance, Coordination, Kinesthetic, Sense, Proprioception, and Posture for (18) minutes.  The following were included:    Quad set 10" hold x20   SLR in long sitting 3x10 2#  Knee ext machine 25# 4x10  DL bridge 4x10    NP today  SL hip abduction 2# 3x15  LAQ 3# 4x15  SL bridge 4x10  HS curl against MR x30  Retrowalking on treadmill 5% incline, 2.5 mph (dec to 0% grade over 10 min)    THERAPEUTIC ACTIVITIES to improve dynamic and functional  performance for (12) minutes including:    Upright bike for 8 min for LE endurance  6" step up 3x10     NP today  3" lateral step down 3x10     Patient Education and Home Exercises       Home Exercises Provided and Patient Education Provided     Education provided:   post op precautions, gait mechanics, POC, prognosis, return to activity, HEP     Written Home Exercises Provided: yes.  Exercises were reviewed and Fredy was able to demonstrate them prior to the end of the session.  Fredy demonstrated good  understanding of the education provided. See EMR under Patient Instructions for exercises provided during therapy sessions.    Assessment     Fredy presents lacking min extension, improves to full w/ " min manual. Pt tolerates progressed open/closed chain strengthening well today. Pt educated on strategies to maintain full knee ext while on light duty. Will re-assess ROM next visit and continue to progress strength as tolerated.    Fredy is progressing well towards his goals.   Patient prognosis is Good.     Patient will continue to benefit from skilled outpatient physical therapy to address the deficits listed in the problem list box on initial evaluation, provide pt/family education and to maximize patient's level of independence in the home and community environment.     Patient's spiritual, cultural and educational needs considered and pt agreeable to plan of care and goals.     Anticipated barriers to physical therapy: work schedule      Goals:  Short Term Goals: 2 weeks     Pt will be independent with HEP and report compliance at least 4 days/week  Pt will demonstrate full passive knee extension and knee flexion to at least 90 deg  Pt will be able to perform 15 SLR without extension lag to demonstrate improved quad strength for gait    Mid term goals: within 12 weeks    Pt will be able to amb 30 min on level surface without gait deviations  Pt will be able to perform sit<>stand to standard chair x10 with equal weight shift  Pt will be able to perform all ADL's/ job responsibilities at PLOF without pain   Pt will demonstrate full symmetrical ROM on R vs L    Long term goals: with 9 months    Pt will demonstrate at least 90% quad strength on R vs L assessed on crane scale at 60 deg flexion  2.   Pt will demonstrate no greater than 4cm difference on anterior Y balance reach to demonstrate symmetrical LE function    Plan     Plan of care Certification: 11/13/2023 to 8/13/24.     Keagan Domínguez, PT, DPT

## 2024-01-11 ENCOUNTER — CLINICAL SUPPORT (OUTPATIENT)
Dept: REHABILITATION | Facility: HOSPITAL | Age: 54
End: 2024-01-11
Payer: COMMERCIAL

## 2024-01-11 DIAGNOSIS — M25.561 ACUTE PAIN OF RIGHT KNEE: Primary | ICD-10-CM

## 2024-01-11 PROCEDURE — 97112 NEUROMUSCULAR REEDUCATION: CPT | Performed by: PHYSICAL THERAPIST

## 2024-01-11 PROCEDURE — 97530 THERAPEUTIC ACTIVITIES: CPT | Performed by: PHYSICAL THERAPIST

## 2024-01-11 PROCEDURE — 97110 THERAPEUTIC EXERCISES: CPT | Performed by: PHYSICAL THERAPIST

## 2024-01-11 PROCEDURE — 97140 MANUAL THERAPY 1/> REGIONS: CPT | Performed by: PHYSICAL THERAPIST

## 2024-01-11 NOTE — PROGRESS NOTES
[facility]   Physical Therapy Treatment Note      [id number]    Therapy Diagnosis:   Encounter Diagnosis   Name Primary?    Acute pain of right knee Yes     Physician: Ryland Coffey MD     Physician Orders: PT Eval and Treat ACL tear  Medical Diagnosis from Referral: Rupture of anterior cruciate ligament of right knee, initial encounter [S83.511A]  Evaluation Date: 11/1/2023  Authorization Period Expiration: 12/31/23  Plan of Care Expiration: extend to 8/30/23  Progress Note Due: 4/1/24  Visit # / Visits authorized: 2/20   FOTO: 1/3    PROCEDURES(S) PERFORMED: 11/9/23  1. Right Arthroscopy, anterior cruciate ligament reconstruction 89230  2.  Right Arthroscopy, with meniscectomy (medial OR lateral) 48979, medial  3.  Right Arthroscopy, debridement/shaving of articular cartilage (chondroplasty) 89310  4.  Right Arthroscopy, with lysis of adhesions 38802  5.  Right Arthroscopy, knee, synovectomy, limited 38273     Precautions: Standard   PHYSICAL THERAPY:  The patient should begin physical therapy on postoperative   day #1-3 and will be advanced to outpatient therapy as soon as   Possible following discharge.     Weight bearing:as tolerated  right leg  Range of Motion:Full normal motion symmetric to opposite side  Immobilizer if present should be locked @-10 degrees with gait and allowed to flex to 120 degrees at rest.     Time In: 2:00 am   Time Out: 3:00 am   Total Billable Time: 60 minutes     Subjective     Patient reports: knee has been feeling good. Pt reports he returns to work tomorrow on light duty    He was compliant with home exercise program.  Response to previous treatment: minimal soreness   Functional change: improved ambulation      Pain: 0/10     Location: right knee    Objective      POD #9 wks 1 days    Observation: Pt presents w/ no AD WBAT on R LE w/  brace. Incisions healing well.    Range of Motion:   Knee Right Left  "  Active 0-125 0-137   Passive 0- 135 heel slide 0-137   128 measured in prone    Function:    - quad set: Good  - SLR: no lag    Treatment     Fredy received the treatments listed below:     MANUAL THERAPY TECHNIQUES were applied for (10) minutes, including:  Patellar mobs all directions  Fat pad mob  Range of motion check   Extension hinge stretch  Lateral tibial glide Gr IV    Not performed today:     Flexion off EOT     THERAPEUTIC EXERCISES to develop strength, endurance, ROM, flexibility, posture, and core stabilization for (15) minutes including:    Hinge stretch with strap 4x15"   Prone quad stretch 3x30"  Gastroc strap stretch 2x30 sec    NP today  Heel slide 2x20  Knee ext LLLD 5# x5 min  HS strap stretch off EOT 2x30 sec      NEUROMUSCULAR RE-EDUCATION ACTIVITIES to improve Balance, Coordination, Kinesthetic, Sense, Proprioception, and Posture for (18) minutes.  The following were included:    Quad set 10" hold x20   SLR in long sitting 3x10 2#  SL hip abduction 2# 3x15  SL bridge 4x10  Knee ext machine 25# 4x10  Side steps 3 laps     NP today  DL bridge 4x10  LAQ 3# 4x15  HS curl against MR x30    THERAPEUTIC ACTIVITIES to improve dynamic and functional  performance for (17) minutes including:    Upright bike for 8 min for LE endurance  6" step up 3x10   3" forward step down 3x10    NP today  3" lateral step down 3x10     Patient Education and Home Exercises       Home Exercises Provided and Patient Education Provided     Education provided:   post op precautions, gait mechanics, POC, prognosis, return to activity, HEP     Written Home Exercises Provided: yes.  Exercises were reviewed and Fredy was able to demonstrate them prior to the end of the session.  Fredy demonstrated good  understanding of the education provided. See EMR under Patient Instructions for exercises provided during therapy sessions.    Assessment     Fredy demonstrates improved carryover of extension. Pt tolerates progressed " open/closed chain strengthening well today. Pt reports training effect in quad without pain in knee. Will continue to progress strength as tolerated.    Fredy is progressing well towards his goals.   Patient prognosis is Good.     Patient will continue to benefit from skilled outpatient physical therapy to address the deficits listed in the problem list box on initial evaluation, provide pt/family education and to maximize patient's level of independence in the home and community environment.     Patient's spiritual, cultural and educational needs considered and pt agreeable to plan of care and goals.     Anticipated barriers to physical therapy: work schedule      Goals:  Short Term Goals: 2 weeks     Pt will be independent with HEP and report compliance at least 4 days/week  Pt will demonstrate full passive knee extension and knee flexion to at least 90 deg  Pt will be able to perform 15 SLR without extension lag to demonstrate improved quad strength for gait    Mid term goals: within 12 weeks    Pt will be able to amb 30 min on level surface without gait deviations  Pt will be able to perform sit<>stand to standard chair x10 with equal weight shift  Pt will be able to perform all ADL's/ job responsibilities at PLOF without pain   Pt will demonstrate full symmetrical ROM on R vs L    Long term goals: with 9 months    Pt will demonstrate at least 90% quad strength on R vs L assessed on crane scale at 60 deg flexion  2.   Pt will demonstrate no greater than 4cm difference on anterior Y balance reach to demonstrate symmetrical LE function    Plan     Plan of care Certification: 11/13/2023 to 8/13/24.     Keagan Domínguez, PT, DPT

## 2024-01-18 ENCOUNTER — CLINICAL SUPPORT (OUTPATIENT)
Dept: REHABILITATION | Facility: HOSPITAL | Age: 54
End: 2024-01-18
Payer: COMMERCIAL

## 2024-01-18 ENCOUNTER — CLINICAL SUPPORT (OUTPATIENT)
Dept: OTHER | Facility: CLINIC | Age: 54
End: 2024-01-18
Payer: COMMERCIAL

## 2024-01-18 DIAGNOSIS — M25.561 ACUTE PAIN OF RIGHT KNEE: Primary | ICD-10-CM

## 2024-01-18 DIAGNOSIS — Z00.8 ENCOUNTER FOR OTHER GENERAL EXAMINATION: ICD-10-CM

## 2024-01-18 PROCEDURE — 97140 MANUAL THERAPY 1/> REGIONS: CPT | Performed by: PHYSICAL THERAPIST

## 2024-01-18 PROCEDURE — 97112 NEUROMUSCULAR REEDUCATION: CPT | Performed by: PHYSICAL THERAPIST

## 2024-01-18 PROCEDURE — 97530 THERAPEUTIC ACTIVITIES: CPT | Performed by: PHYSICAL THERAPIST

## 2024-01-18 NOTE — PROGRESS NOTES
[facility]   Physical Therapy Treatment Note      [id number]    Therapy Diagnosis:   Encounter Diagnosis   Name Primary?    Acute pain of right knee Yes     Physician: Ryland Coffey MD     Physician Orders: PT Eval and Treat ACL tear  Medical Diagnosis from Referral: Rupture of anterior cruciate ligament of right knee, initial encounter [S83.511A]  Evaluation Date: 11/1/2023  Authorization Period Expiration: 12/31/23  Plan of Care Expiration: extend to 8/30/23  Progress Note Due: 4/1/24  Visit # / Visits authorized: 3/20   FOTO: 1/3    PROCEDURES(S) PERFORMED: 11/9/23  1. Right Arthroscopy, anterior cruciate ligament reconstruction 12598  2.  Right Arthroscopy, with meniscectomy (medial OR lateral) 08489, medial  3.  Right Arthroscopy, debridement/shaving of articular cartilage (chondroplasty) 09056  4.  Right Arthroscopy, with lysis of adhesions 81197  5.  Right Arthroscopy, knee, synovectomy, limited 84938     Precautions: Standard   PHYSICAL THERAPY:  The patient should begin physical therapy on postoperative   day #1-3 and will be advanced to outpatient therapy as soon as   Possible following discharge.     Weight bearing:as tolerated  right leg  Range of Motion:Full normal motion symmetric to opposite side  Immobilizer if present should be locked @-10 degrees with gait and allowed to flex to 120 degrees at rest.     Time In: 2:00 am   Time Out: 3:00 am   Total Billable Time: 60 minutes     Subjective     Patient reports: muscle soreness after last visit. Pt reports HEP tolerated well feels ADL's approaching PLOF    He was compliant with home exercise program.  Response to previous treatment: minimal soreness   Functional change: improved ambulation      Pain: 0/10     Location: right knee    Objective      POD #9 wks 6 days    Observation: Pt presents w/ no AD WBAT on R LE w/  brace. Incisions healing well.    Range of Motion:   Knee  "Right Left   Active 0-125 0-137   Passive 0- 137 heel slide 0-137   128 measured in prone    Function:    - quad set: Good  - SLR: no lag    Treatment     Fredy received the treatments listed below:     MANUAL THERAPY TECHNIQUES were applied for (12) minutes, including:  Patellar mobs all directions  Fat pad mob  Range of motion check   Extension hinge stretch  Lateral tibial glide Gr IV    Not performed today:     Flexion off EOT     THERAPEUTIC EXERCISES to develop strength, endurance, ROM, flexibility, posture, and core stabilization for (3) minutes including:    Standing quad stretch 3x30"    NP today  Heel slide 2x20  Hinge stretch with strap 4x15"   Knee ext LLLD 5# x5 min  HS strap stretch off EOT 2x30 sec  Gastroc strap stretch 2x30 sec      NEUROMUSCULAR RE-EDUCATION ACTIVITIES to improve Balance, Coordination, Kinesthetic, Sense, Proprioception, and Posture for (18) minutes.  The following were included:    SLR in long sitting 3x10 2#  SL hip abduction 2# 3x15  Knee ext machine 25# 4x10      NP today  Quad set 10" hold x20   Side steps 3 laps   DL bridge 4x10  SL bridge 4x10  LAQ 3# 4x15  HS curl against MR x30    THERAPEUTIC ACTIVITIES to improve dynamic and functional  performance for (27) minutes including:    Upright bike for 7 min for LE endurance    Circuit 3 rounds  - 50# sled push 40 ft  - 15 DL squat  - 50# sled pull 40 ft  - front lunge x10    3" forward step down 3x10    NP today  6" step up 3x10   3" lateral step down 3x10     Patient Education and Home Exercises       Home Exercises Provided and Patient Education Provided     Education provided:   post op precautions, gait mechanics, POC, prognosis, return to activity, HEP     Written Home Exercises Provided: yes.  Exercises were reviewed and Fredy was able to demonstrate them prior to the end of the session.  Fredy demonstrated good  understanding of the education provided. See EMR under Patient Instructions for exercises provided during " therapy sessions.    Assessment     Fredy presents w/ good carryover of knee ROM from last visit. Pt tolerates progressed functional circuit well. Limited by endurance but strength appropriate for time after surgery. Pt educated on activity modification. Will continue to see pt 1x/week for about one more month prior to dec frequency    Fredy is progressing well towards his goals.   Patient prognosis is Good.     Patient will continue to benefit from skilled outpatient physical therapy to address the deficits listed in the problem list box on initial evaluation, provide pt/family education and to maximize patient's level of independence in the home and community environment.     Patient's spiritual, cultural and educational needs considered and pt agreeable to plan of care and goals.     Anticipated barriers to physical therapy: work schedule      Goals:  Short Term Goals: 2 weeks     Pt will be independent with HEP and report compliance at least 4 days/week  Pt will demonstrate full passive knee extension and knee flexion to at least 90 deg  Pt will be able to perform 15 SLR without extension lag to demonstrate improved quad strength for gait    Mid term goals: within 12 weeks    Pt will be able to amb 30 min on level surface without gait deviations  Pt will be able to perform sit<>stand to standard chair x10 with equal weight shift  Pt will be able to perform all ADL's/ job responsibilities at PLOF without pain   Pt will demonstrate full symmetrical ROM on R vs L    Long term goals: with 9 months    Pt will demonstrate at least 90% quad strength on R vs L assessed on crane scale at 60 deg flexion  2.   Pt will demonstrate no greater than 4cm difference on anterior Y balance reach to demonstrate symmetrical LE function    Plan     Plan of care Certification: 11/13/2023 to 8/13/24.     Keagan Domínguez, PT, DPT

## 2024-01-19 VITALS
HEIGHT: 70 IN | SYSTOLIC BLOOD PRESSURE: 145 MMHG | DIASTOLIC BLOOD PRESSURE: 86 MMHG | BODY MASS INDEX: 34.65 KG/M2 | WEIGHT: 242 LBS

## 2024-01-19 LAB
HDLC SERPL-MCNC: 14 MG/DL
POC CHOLESTEROL, TOTAL: 99 MG/DL
POC GLUCOSE, FASTING: 116 MG/DL (ref 60–110)
TRIGL SERPL-MCNC: 142 MG/DL

## 2024-01-25 ENCOUNTER — CLINICAL SUPPORT (OUTPATIENT)
Dept: REHABILITATION | Facility: HOSPITAL | Age: 54
End: 2024-01-25
Payer: COMMERCIAL

## 2024-01-25 DIAGNOSIS — M25.561 ACUTE PAIN OF RIGHT KNEE: Primary | ICD-10-CM

## 2024-01-25 PROCEDURE — 97140 MANUAL THERAPY 1/> REGIONS: CPT | Performed by: PHYSICAL THERAPIST

## 2024-01-25 PROCEDURE — 97112 NEUROMUSCULAR REEDUCATION: CPT | Performed by: PHYSICAL THERAPIST

## 2024-01-25 PROCEDURE — 97530 THERAPEUTIC ACTIVITIES: CPT | Performed by: PHYSICAL THERAPIST

## 2024-01-30 NOTE — PROGRESS NOTES
[facility]   Physical Therapy Treatment Note      Name: Fredyruba Bo  [facility] Number: [id number]    Therapy Diagnosis:   Encounter Diagnosis   Name Primary?    Acute pain of right knee Yes     Physician: Ryland Coffey MD     Physician Orders: PT Eval and Treat ACL tear  Medical Diagnosis from Referral: Rupture of anterior cruciate ligament of right knee, initial encounter [S83.511A]  Evaluation Date: 11/1/2023  Authorization Period Expiration: 12/31/23  Plan of Care Expiration: extend to 8/30/23  Progress Note Due: 4/1/24  Visit # / Visits authorized: 4/20   FOTO: 1/3    PROCEDURES(S) PERFORMED: 11/9/23  1. Right Arthroscopy, anterior cruciate ligament reconstruction 12375  2.  Right Arthroscopy, with meniscectomy (medial OR lateral) 12966, medial  3.  Right Arthroscopy, debridement/shaving of articular cartilage (chondroplasty) 11564  4.  Right Arthroscopy, with lysis of adhesions 18457  5.  Right Arthroscopy, knee, synovectomy, limited 46857     Precautions: Standard   PHYSICAL THERAPY:  The patient should begin physical therapy on postoperative   day #1-3 and will be advanced to outpatient therapy as soon as   Possible following discharge.     Weight bearing:as tolerated  right leg  Range of Motion:Full normal motion symmetric to opposite side  Immobilizer if present should be locked @-10 degrees with gait and allowed to flex to 120 degrees at rest.     Time In: 2:00 am   Time Out: 3:00 am   Total Billable Time: 60 minutes     Subjective     Patient reports: Knee has been feeling good. Not concerned about anything return to work when cleared    He was compliant with home exercise program.  Response to previous treatment: minimal soreness   Functional change: ADL's work duties approaching PLOF    Pain: 0/10     Location: right knee    Objective      POD #10 wks 0 days    Observation: Pt presents w/ no AD WBAT on R LE w/  brace. Incisions healing well.    Range of Motion:  "  Knee Right Left   Active 0-125 0-137   Passive 0- 137 heel slide 0-137   128 measured in prone    Function:    - quad set: Good  - SLR: no lag    Treatment     Fredy received the treatments listed below:     MANUAL THERAPY TECHNIQUES were applied for (12) minutes, including:  Patellar mobs all directions  Fat pad mob  Range of motion check   Extension hinge stretch  Lateral tibial glide Gr IV    Not performed today:     Flexion off EOT     THERAPEUTIC EXERCISES to develop strength, endurance, ROM, flexibility, posture, and core stabilization for (3) minutes including:    Standing quad stretch 3x30"    NP today  Heel slide 2x20  Hinge stretch with strap 4x15"   Knee ext LLLD 5# x5 min  HS strap stretch off EOT 2x30 sec  Gastroc strap stretch 2x30 sec      NEUROMUSCULAR RE-EDUCATION ACTIVITIES to improve Balance, Coordination, Kinesthetic, Sense, Proprioception, and Posture for (18) minutes.  The following were included:    SLR in long sitting 3x10 2#  SL hip abduction 2# 3x15  Knee ext machine 35# 4x10  SL bridge 4x10    NP today  Quad set 10" hold x20   Side steps 3 laps   DL bridge 4x10  HS curl against MR x30    THERAPEUTIC ACTIVITIES to improve dynamic and functional  performance for (27) minutes including:    Upright bike for 7 min for LE endurance  3" forward step down 3x10  6" step up 3x10   12" step up 4x6    NP today  Circuit 3 rounds  - 50# sled push 40 ft  - 15 DL squat  - 50# sled pull 40 ft  - front lunge x10  3" lateral step down 3x10     Patient Education and Home Exercises       Home Exercises Provided and Patient Education Provided     Education provided:   post op precautions, gait mechanics, POC, prognosis, return to activity, HEP     Written Home Exercises Provided: yes.  Exercises were reviewed and Fredy was able to demonstrate them prior to the end of the session.  Fredy demonstrated good  understanding of the education provided. See EMR under Patient Instructions for exercises provided during " therapy sessions.    Assessment     Fredy continues to maintain full knee ROM. Open/closed chain strength progressions tolerated well today. Pt feels confident performing work duties at [employer]. Will likely dec freq to every other week once pt has returned to work, planning to be cleared at next f/u with Dr. Coffey    Fredy is progressing well towards his goals.   Patient prognosis is Good.     Patient will continue to benefit from skilled outpatient physical therapy to address the deficits listed in the problem list box on initial evaluation, provide pt/family education and to maximize patient's level of independence in the home and community environment.     Patient's spiritual, cultural and educational needs considered and pt agreeable to plan of care and goals.     Anticipated barriers to physical therapy: work schedule      Goals:  Short Term Goals: 2 weeks     Pt will be independent with HEP and report compliance at least 4 days/week  Pt will demonstrate full passive knee extension and knee flexion to at least 90 deg  Pt will be able to perform 15 SLR without extension lag to demonstrate improved quad strength for gait    Mid term goals: within 12 weeks    Pt will be able to amb 30 min on level surface without gait deviations  Pt will be able to perform sit<>stand to standard chair x10 with equal weight shift  Pt will be able to perform all ADL's/ job responsibilities at [employer] without pain   Pt will demonstrate full symmetrical ROM on R vs L    Long term goals: with 9 months    Pt will demonstrate at least 90% quad strength on R vs L assessed on crane scale at 60 deg flexion  2.   Pt will demonstrate no greater than 4cm difference on anterior Y balance reach to demonstrate symmetrical LE function    Plan     Plan of care Certification: 11/13/2023 to 8/13/24.     Keagan Domínguez, PT, DPT

## 2024-02-01 ENCOUNTER — CLINICAL SUPPORT (OUTPATIENT)
Dept: REHABILITATION | Facility: HOSPITAL | Age: 54
End: 2024-02-01
Payer: COMMERCIAL

## 2024-02-01 DIAGNOSIS — M25.561 ACUTE PAIN OF RIGHT KNEE: Primary | ICD-10-CM

## 2024-02-01 PROCEDURE — 97112 NEUROMUSCULAR REEDUCATION: CPT | Performed by: PHYSICAL THERAPIST

## 2024-02-01 PROCEDURE — 97530 THERAPEUTIC ACTIVITIES: CPT | Performed by: PHYSICAL THERAPIST

## 2024-02-01 PROCEDURE — 97140 MANUAL THERAPY 1/> REGIONS: CPT | Performed by: PHYSICAL THERAPIST

## 2024-02-05 ENCOUNTER — HOSPITAL ENCOUNTER (OUTPATIENT)
Dept: RADIOLOGY | Facility: HOSPITAL | Age: 54
Discharge: HOME | End: 2024-02-05
Attending: ORTHOPAEDIC SURGERY
Payer: COMMERCIAL

## 2024-02-05 ENCOUNTER — OFFICE VISIT (OUTPATIENT)
Dept: SPORTS MEDICINE | Facility: CLINIC | Age: 54
End: 2024-02-05
Payer: COMMERCIAL

## 2024-02-05 VITALS
HEART RATE: 94 BPM | HEIGHT: 70 IN | SYSTOLIC BLOOD PRESSURE: 138 MMHG | WEIGHT: 243.06 LBS | BODY MASS INDEX: 34.8 KG/M2 | DIASTOLIC BLOOD PRESSURE: 78 MMHG

## 2024-02-05 DIAGNOSIS — M25.561 RIGHT KNEE PAIN, UNSPECIFIED CHRONICITY: Primary | ICD-10-CM

## 2024-02-05 DIAGNOSIS — M25.561 RIGHT KNEE PAIN, UNSPECIFIED CHRONICITY: ICD-10-CM

## 2024-02-05 DIAGNOSIS — Z98.890 S/P ACL RECONSTRUCTION: ICD-10-CM

## 2024-02-05 PROCEDURE — 3078F DIAST BP <80 MM HG: CPT | Mod: CPTII,S$GLB,, | Performed by: ORTHOPAEDIC SURGERY

## 2024-02-05 PROCEDURE — 99999 PR PBB SHADOW E&M-EST. PATIENT-LVL III: CPT | Mod: PBBFAC,,, | Performed by: ORTHOPAEDIC SURGERY

## 2024-02-05 PROCEDURE — 73564 X-RAY EXAM KNEE 4 OR MORE: CPT | Mod: 26,,, | Performed by: RADIOLOGY

## 2024-02-05 PROCEDURE — 73564 X-RAY EXAM KNEE 4 OR MORE: CPT | Mod: TC,50

## 2024-02-05 PROCEDURE — 99024 POSTOP FOLLOW-UP VISIT: CPT | Mod: S$GLB,,, | Performed by: ORTHOPAEDIC SURGERY

## 2024-02-05 PROCEDURE — 1159F MED LIST DOCD IN RCRD: CPT | Mod: CPTII,S$GLB,, | Performed by: ORTHOPAEDIC SURGERY

## 2024-02-05 PROCEDURE — 3075F SYST BP GE 130 - 139MM HG: CPT | Mod: CPTII,S$GLB,, | Performed by: ORTHOPAEDIC SURGERY

## 2024-02-05 NOTE — PROGRESS NOTES
Subjective:          Chief Complaint: Fredy Bo is a 53 y.o. male who had concerns including Pain of the Right Knee.    Patient is s/p below procedures. He has been attending formal PT at [facility] 1x a week with Keagan. He is doing well today and has no pain.      Date of Procedure: 11/9/2023      Procedure: 1. Right  Arthroscopy, anterior cruciate ligament reconstruction 50658  2. Right   22489 Ligament knee reconstruction, extra-articular  2.  Right  Arthroscopy, with meniscectomy (medial OR lateral) 13431, medial  3.  Right  Arthroscopy, debridement/shaving of articular cartilage (chondroplasty) 50852  4.  Right  Arthroscopy, with lysis of adhesions 71233  5.  Right  Arthroscopy, knee, synovectomy, limited 86454     Surgeon(s) and Role:     * Ryland Coffey MD - Primary     Assisting Surgeon:      MD Indira Sexton,  [facility]        Review of Systems   Constitutional: Negative for chills, fever and night sweats.   HENT:  Negative for hearing loss.    Eyes:  Negative for blurred vision and double vision.   Cardiovascular:  Negative for chest pain, claudication and leg swelling.   Respiratory:  Negative for shortness of breath.    Endocrine: Negative for polydipsia, polyphagia and polyuria.   Hematologic/Lymphatic: Negative for adenopathy and bleeding problem. Does not bruise/bleed easily.   Skin:  Negative for poor wound healing.   Gastrointestinal:  Negative for diarrhea and heartburn.   Genitourinary:  Negative for bladder incontinence.   Neurological:  Negative for focal weakness, headaches, numbness, paresthesias and sensory change.   Psychiatric/Behavioral:  The patient is not nervous/anxious.    Allergic/Immunologic: Negative for persistent infections.       Pain Related Questions  Over the past 3 days, what was your highest pain level?: 0  Over the past 3 days, what was your lowest pain level? : 0    Other  How many nights a week are you awakened by your affected body part?: 0  Was  the patient's HEIGHT measured or patient reported?: Patient Reported  Was the patient's WEIGHT measured or patient reported?: Measured      Objective:        General: Fredy is well-developed, well-nourished, appears stated age, in no acute distress, alert and oriented to time, place and person.     General    Constitutional: He is oriented to person, place, and time. He appears well-developed and well-nourished.   HENT:   Head: Normocephalic and atraumatic.   Eyes: EOM are normal.   Cardiovascular:  Normal rate.            Pulmonary/Chest: Effort normal.   Neurological: He is alert and oriented to person, place, and time.   Psychiatric: He has a normal mood and affect. His behavior is normal.     General Musculoskeletal Exam   Gait: normal       Right Knee Exam     Inspection   Erythema: absent  Scars: present  Swelling: present  Effusion: present  Deformity: absent  Bruising: absent    Tenderness   The patient is experiencing no tenderness.     Range of Motion   Extension:  0   Right knee flexion: 135.     Tests   Meniscus   Mikel:  Medial - negative Lateral - negative  Ligament Examination   Lachman: normal (-1 to 2mm)   PCL-Posterior Drawer: normal (0 to 2mm)     MCL - Valgus: normal (0 to 2mm)  LCL - Varus: normal  Pivot Shift: normal (Equal)  Reverse Pivot Shift: normal (Equal)  Posterior Sag Test: negative  Posterolateral Corner: stable  Patella   Patellar apprehension: negative  Passive Patellar Tilt: neutral  Patellar Tracking: normal  Patellar Glide (quadrants): Lateral - 1     Q-Angle at 90 degrees: normal  Patellar Grind: negative  J-Sign: none    Other   Meniscal Cyst: absent  Popliteal (Baker's) Cyst: absent  Sensation: normal  Apley Grind Test: negative  Home Bounce Test: negative    Comments:  Mild edema and well healed surgical scars of the right knee       Left Knee Exam     Inspection   Erythema: absent  Scars: absent  Swelling: absent  Effusion: absent  Deformity: absent  Bruising:  absent    Tenderness   The patient is experiencing no tenderness.     Range of Motion   Extension:  0   Left knee flexion: 135.     Tests   Meniscus   Mikle:  Medial - negative Lateral - negative  Stability   Lachman: normal (-1 to 2mm)   PCL-Posterior Drawer: normal (0 to 2mm)  MCL - Valgus: normal (0 to 2mm)  LCL - Varus: normal (0 to 2mm)  Pivot Shift: normal (Equal)  Reverse Pivot Shift: normal (Equal)  Posterior Sag Test: negative  Posterolateral Corner: stable  Patella   Patellar apprehension: negative  Passive Patellar Tilt: neutral  Patellar Tracking: normal  Patellar Glide (Quadrants): Lateral - 1   Q-Angle at 90 degrees: normal  Patellar Grind: negative  J-Sign: J sign absent    Other   Meniscal Cyst: absent  Popliteal (Baker's) Cyst: absent  Sensation: normal  Apley Grind Test: negative  Home Bounce Test: negative    Right Hip Exam     Tests   Justine: negative  Left Hip Exam     Tests   Justine: negative          Muscle Strength   Right Lower Extremity   Hip Abduction: 5/5   Quadriceps:  5/5   Hamstrin/5   Left Lower Extremity   Hip Abduction: 5/5   Quadriceps:  5/5   Hamstrin/5     Reflexes     Left Side  Quadriceps:  2+    Right Side   Quadriceps:  2+    Vascular Exam     Right Pulses  Dorsalis Pedis:      2+          Left Pulses  Dorsalis Pedis:      2+          Edema  Right Lower Leg: present  Left Lower Leg: absent    24  Radiographs of bilateral knees with post surgical changes on the right ACL reconstruction, evident. No fractures or dislocation.       Assessment:       Encounter Diagnoses   Name Primary?    Right knee pain, unspecified chronicity Yes    S/P ACL reconstruction           Plan:       1. RTC in 3 months with Dr. Ryland Coffey. IKDC, SF-12 and KOOS was filled out today in clinic. Patient will fill out IKDC, SF-12 and KOOS on return.    2. Medications: Refills of the following Rx were sent to patients preferred Pharmacy:  No Refills Needed Today    3. Physical Therapy:  Continue/Begin: Continue at [facility] with Keagan    4. HEP: N/A    5. Procedures/Procedural Planning:   N/A    6. DME: Medial     7. Work/Sport Status: Return to work note for full duty sent now     8. Visit Summary: Follow up in 3 months, continue PT with Curtis Boogie for return to work.                 [facility] patient questionnaires have been collected today.

## 2024-02-05 NOTE — LETTER
Patient: Fredy Bo   YOB: 1970   Clinic Number: [id number]   Today's Date: February 5, 2024        Certificate to Return to Work     Fredy Tyler was seen by Ryland Coffey MD on 2/5/2024.    No follow-ups on file. Fredy Tyler will be seen by Dr. Ryland Coffey.    Fredy Tyler can return to work on 2/6/24 with full duty.    Specific restrictions: None     If you have any questions or concerns, please feel free to contact the office at [phone].    Thank you.    Ryland Coffey MD        Signature: __________________________________________________

## 2024-02-06 NOTE — PROGRESS NOTES
[facility]   Physical Therapy Treatment Note      [id number]    Therapy Diagnosis:   Encounter Diagnosis   Name Primary?    Acute pain of right knee Yes     Physician: Ryland Coffey MD     Physician Orders: PT Eval and Treat ACL tear  Medical Diagnosis from Referral: Rupture of anterior cruciate ligament of right knee, initial encounter [S83.511A]  Evaluation Date: 11/1/2023  Authorization Period Expiration: 12/31/23  Plan of Care Expiration: extend to 8/30/23  Progress Note Due: 4/1/24  Visit # / Visits authorized: 5/20   FOTO: 1/3    PROCEDURES(S) PERFORMED: 11/9/23  1. Right Arthroscopy, anterior cruciate ligament reconstruction 23219  2.  Right Arthroscopy, with meniscectomy (medial OR lateral) 21705, medial  3.  Right Arthroscopy, debridement/shaving of articular cartilage (chondroplasty) 91336  4.  Right Arthroscopy, with lysis of adhesions 73014  5.  Right Arthroscopy, knee, synovectomy, limited 57587     Precautions: Standard   PHYSICAL THERAPY:  The patient should begin physical therapy on postoperative   day #1-3 and will be advanced to outpatient therapy as soon as   Possible following discharge.     Weight bearing:as tolerated  right leg  Range of Motion:Full normal motion symmetric to opposite side  Immobilizer if present should be locked @-10 degrees with gait and allowed to flex to 120 degrees at rest.     Time In: 3:00 am   Time Out: 355am   Total Billable Time: 55 minutes     Subjective     Patient reports:  min muscle soreness after last visit. ADL's and work duties approaching PLOF Has f/u with Dr. Coffey next week.    He was compliant with home exercise program.  Response to previous treatment: minimal soreness   Functional change: ADL's work duties approaching PLOF    Pain: 0/10     Location: right knee    Objective      POD #10 wks 0 days    Observation: Pt presents w/ no AD WBAT on R LE w/  brace. Incisions healing  "well.    Range of Motion:   Knee Right Left   Active 0-125 0-137   Passive 0- 137 heel slide 0-137   128 measured in prone    Function:    - quad set: Good  - SLR: no lag    Treatment     Fredy received the treatments listed below:     MANUAL THERAPY TECHNIQUES were applied for (10) minutes, including:  Patellar mobs all directions  Fat pad mob  Range of motion check   Extension hinge stretch  Lateral tibial glide Gr IV    Not performed today:     Flexion off EOT     THERAPEUTIC EXERCISES to develop strength, endurance, ROM, flexibility, posture, and core stabilization for (3) minutes including:    Standing quad stretch 3x30"    NP today  Heel slide 2x20  Hinge stretch with strap 4x15"   Knee ext LLLD 5# x5 min  HS strap stretch off EOT 2x30 sec  Gastroc strap stretch 2x30 sec      NEUROMUSCULAR RE-EDUCATION ACTIVITIES to improve Balance, Coordination, Kinesthetic, Sense, Proprioception, and Posture for (18) minutes.  The following were included:    SLR in long sitting 3x10 2#  Side steps 3 laps   Knee ext machine 35# 4x15  SL bridge 4x10    NP today  Quad set 10" hold x20   SL hip abduction 2# 3x15  DL bridge 4x10  HS curl against MR x30    THERAPEUTIC ACTIVITIES to improve dynamic and functional  performance for (27) minutes including:    Upright bike for 7 min for LE endurance  5" forward step down 3x10  6" step up 3x10   12" step up 4x6  Deadlift w/ 35# DB 4x10    NP today  Circuit 3 rounds  - 50# sled push 40 ft  - 15 DL squat  - 50# sled pull 40 ft  - front lunge x10  3" lateral step down 3x10     Patient Education and Home Exercises       Home Exercises Provided and Patient Education Provided     Education provided:   post op precautions, gait mechanics, POC, prognosis, return to activity, HEP     Written Home Exercises Provided: yes.  Exercises were reviewed and Fredy was able to demonstrate them prior to the end of the session.  Fredy demonstrated good  understanding of the education provided. See EMR under " Patient Instructions for exercises provided during therapy sessions.    Assessment     Fredy presents w/ good carryover of flexion ROM. Pt tolerates progressed open/closed chain strengthening well. Pt with good squat/ step up form, pt benefits from cues for hip hinge form. Pt will continue to benefit from strengthening in order to return to PLOF    Fredy is progressing well towards his goals.   Patient prognosis is Good.     Patient will continue to benefit from skilled outpatient physical therapy to address the deficits listed in the problem list box on initial evaluation, provide pt/family education and to maximize patient's level of independence in the home and community environment.     Patient's spiritual, cultural and educational needs considered and pt agreeable to plan of care and goals.     Anticipated barriers to physical therapy: work schedule      Goals:  Short Term Goals: 2 weeks     Pt will be independent with HEP and report compliance at least 4 days/week  Pt will demonstrate full passive knee extension and knee flexion to at least 90 deg  Pt will be able to perform 15 SLR without extension lag to demonstrate improved quad strength for gait    Mid term goals: within 12 weeks    Pt will be able to amb 30 min on level surface without gait deviations  Pt will be able to perform sit<>stand to standard chair x10 with equal weight shift  Pt will be able to perform all ADL's/ job responsibilities at PLOF without pain   Pt will demonstrate full symmetrical ROM on R vs L    Long term goals: with 9 months    Pt will demonstrate at least 90% quad strength on R vs L assessed on crane scale at 60 deg flexion  2.   Pt will demonstrate no greater than 4cm difference on anterior Y balance reach to demonstrate symmetrical LE function    Plan     Plan of care Certification: 11/13/2023 to 8/13/24.     Keagan Domínguez, PT, DPT

## 2024-02-08 ENCOUNTER — CLINICAL SUPPORT (OUTPATIENT)
Dept: REHABILITATION | Facility: HOSPITAL | Age: 54
End: 2024-02-08
Payer: COMMERCIAL

## 2024-02-08 DIAGNOSIS — M25.561 ACUTE PAIN OF RIGHT KNEE: Primary | ICD-10-CM

## 2024-02-08 PROCEDURE — 97112 NEUROMUSCULAR REEDUCATION: CPT | Performed by: PHYSICAL THERAPIST

## 2024-02-08 PROCEDURE — 97530 THERAPEUTIC ACTIVITIES: CPT | Performed by: PHYSICAL THERAPIST

## 2024-02-13 NOTE — PROGRESS NOTES
[facility]   Physical Therapy Treatment Note      [id number]    Therapy Diagnosis:   Encounter Diagnosis   Name Primary?    Acute pain of right knee Yes     Physician: Ryland Coffey MD     Physician Orders: PT Eval and Treat ACL tear  Medical Diagnosis from Referral: Rupture of anterior cruciate ligament of right knee, initial encounter [S83.511A]  Evaluation Date: 11/1/2023  Authorization Period Expiration: 12/31/23  Plan of Care Expiration: extend to 8/30/23  Progress Note Due: 4/1/24  Visit # / Visits authorized: 5/20   FOTO: 1/3    PROCEDURES(S) PERFORMED: 11/9/23  1. Right Arthroscopy, anterior cruciate ligament reconstruction 80922  2.  Right Arthroscopy, with meniscectomy (medial OR lateral) 65433, medial  3.  Right Arthroscopy, debridement/shaving of articular cartilage (chondroplasty) 36974  4.  Right Arthroscopy, with lysis of adhesions 78907  5.  Right Arthroscopy, knee, synovectomy, limited 79800     Precautions: Standard   PHYSICAL THERAPY:  The patient should begin physical therapy on postoperative   day #1-3 and will be advanced to outpatient therapy as soon as   Possible following discharge.     Weight bearing:as tolerated  right leg  Range of Motion:Full normal motion symmetric to opposite side  Immobilizer if present should be locked @-10 degrees with gait and allowed to flex to 120 degrees at rest.     Time In: 3:00 am   Time Out: 355am   Total Billable Time: 55 minutes     Subjective     Patient reports:  had f/u with Dr. Coffey, went well. Pt cleared for full release at work    He was compliant with home exercise program.  Response to previous treatment: minimal soreness   Functional change: ADL's work duties approaching PLOF    Pain: 0/10     Location: right knee    Objective      POD #12 wks 0 days    Observation: Pt presents w/ no AD WBAT on R LE w/  brace. Incisions healing well.    Range of Motion:   Knee Right Left  "  Active 0-125 0-137   Passive 0- 137 heel slide 0-137   133 measured in prone    Function:    - quad set: Good  - SLR: no lag    Treatment     Fredy received the treatments listed below:     MANUAL THERAPY TECHNIQUES were applied for (00) minutes, including:  Patellar mobs all directions  Fat pad mob  Range of motion check   Extension hinge stretch  Lateral tibial glide Gr IV    Not performed today:     Flexion off EOT     THERAPEUTIC EXERCISES to develop strength, endurance, ROM, flexibility, posture, and core stabilization for (3) minutes including:    Standing quad stretch 3x30"    NP today  Heel slide 2x20  Hinge stretch with strap 4x15"   Knee ext LLLD 5# x5 min  HS strap stretch off EOT 2x30 sec  Gastroc strap stretch 2x30 sec      NEUROMUSCULAR RE-EDUCATION ACTIVITIES to improve Balance, Coordination, Kinesthetic, Sense, Proprioception, and Posture for (40) minutes.  The following were included:    SLR in long sitting 3x10 2#  Side steps 3 laps   Knee ext machine 35# 4x15  HS curl machine 35# 4x10  SL bridge 4x10  DL bridge 4x10  Plank on SB 3x15"    NP today  Quad set 10" hold x20   SL hip abduction 2# 3x15    HS curl against MR x30    THERAPEUTIC ACTIVITIES to improve dynamic and functional  performance for (17) minutes including:    Upright bike for 7 min for LE endurance  Dowel hip hinge 3x20  Hip hinge w/ band resistance at waist 4x10    NP today  5" forward step down 3x10  6" step up 3x10   12" step up 4x6    Patient Education and Home Exercises       Home Exercises Provided and Patient Education Provided     Education provided:   post op precautions, gait mechanics, POC, prognosis, return to activity, HEP     Written Home Exercises Provided: yes.  Exercises were reviewed and Fredy was able to demonstrate them prior to the end of the session.  Fredy demonstrated good  understanding of the education provided. See EMR under Patient Instructions for exercises provided during therapy " sessions.    Assessment     Fredy with flexion in prone approaching symmetry. Pt tolerates open chain strength progressions well today. Pt benefits from dowel cue to improve hip hinge form. Will continue to progress functional strengthening as tolerated    Fredy is progressing well towards his goals.   Patient prognosis is Good.     Patient will continue to benefit from skilled outpatient physical therapy to address the deficits listed in the problem list box on initial evaluation, provide pt/family education and to maximize patient's level of independence in the home and community environment.     Patient's spiritual, cultural and educational needs considered and pt agreeable to plan of care and goals.     Anticipated barriers to physical therapy: work schedule      Goals:  Short Term Goals: 2 weeks     Pt will be independent with HEP and report compliance at least 4 days/week  Pt will demonstrate full passive knee extension and knee flexion to at least 90 deg  Pt will be able to perform 15 SLR without extension lag to demonstrate improved quad strength for gait    Mid term goals: within 12 weeks    Pt will be able to amb 30 min on level surface without gait deviations  Pt will be able to perform sit<>stand to standard chair x10 with equal weight shift  Pt will be able to perform all ADL's/ job responsibilities at PLOF without pain   Pt will demonstrate full symmetrical ROM on R vs L    Long term goals: with 9 months    Pt will demonstrate at least 90% quad strength on R vs L assessed on crane scale at 60 deg flexion  2.   Pt will demonstrate no greater than 4cm difference on anterior Y balance reach to demonstrate symmetrical LE function    Plan     Plan of care Certification: 11/13/2023 to 8/13/24.     Keagan Domínguez, PT, DPT

## 2024-02-22 ENCOUNTER — CLINICAL SUPPORT (OUTPATIENT)
Dept: REHABILITATION | Facility: HOSPITAL | Age: 54
End: 2024-02-22
Payer: COMMERCIAL

## 2024-02-22 DIAGNOSIS — M25.561 ACUTE PAIN OF RIGHT KNEE: Primary | ICD-10-CM

## 2024-02-22 PROCEDURE — 97530 THERAPEUTIC ACTIVITIES: CPT | Performed by: PHYSICAL THERAPIST

## 2024-02-22 PROCEDURE — 97112 NEUROMUSCULAR REEDUCATION: CPT | Performed by: PHYSICAL THERAPIST

## 2024-02-22 NOTE — PROGRESS NOTES
[facility]   Physical Therapy Treatment Note      [id number]    Therapy Diagnosis:   No diagnosis found.    Physician: Ryland Coffey MD     Physician Orders: PT Eval and Treat ACL tear  Medical Diagnosis from Referral: Rupture of anterior cruciate ligament of right knee, initial encounter [S83.511A]  Evaluation Date: 11/1/2023  Authorization Period Expiration: 12/31/23  Plan of Care Expiration: extend to 8/30/23  Progress Note Due: 4/1/24  Visit # / Visits authorized: 7/20   FOTO: 1/3    PROCEDURES(S) PERFORMED: 11/9/23  1. Right Arthroscopy, anterior cruciate ligament reconstruction 90483  2.  Right Arthroscopy, with meniscectomy (medial OR lateral) 07039, medial  3.  Right Arthroscopy, debridement/shaving of articular cartilage (chondroplasty) 55401  4.  Right Arthroscopy, with lysis of adhesions 41891  5.  Right Arthroscopy, knee, synovectomy, limited 52780     Precautions: Standard   PHYSICAL THERAPY:  The patient should begin physical therapy on postoperative   day #1-3 and will be advanced to outpatient therapy as soon as   Possible following discharge.     Weight bearing:as tolerated  right leg  Range of Motion:Full normal motion symmetric to opposite side  Immobilizer if present should be locked @-10 degrees with gait and allowed to flex to 120 degrees at rest.     Time In: 3:00 am   Time Out: 355am   Total Billable Time: 55 minutes     Subjective     Patient reports:  felt good after last visit. Returned to all job duties nearly at [employer]. Feels ready to dec visits to every other week.     He was compliant with home exercise program.  Response to previous treatment: minimal soreness   Functional change: ADL's work duties approaching [employer]    Pain: 0/10     Location: right knee    Objective      POD #15 wks 0 days    Range of Motion:   Knee Right Left   Active 0-125 0-137   Passive 0- 137 heel slide 0-137   133 measured in  "prone    Function:    - quad set: Good  - SLR: no lag    Treatment     Fredy received the treatments listed below:     MANUAL THERAPY TECHNIQUES were applied for (00) minutes, including:  Patellar mobs all directions  Fat pad mob  Range of motion check   Extension hinge stretch  Lateral tibial glide Gr IV    Not performed today:     Flexion off EOT     THERAPEUTIC EXERCISES to develop strength, endurance, ROM, flexibility, posture, and core stabilization for (3) minutes including:    Standing quad stretch 3x30"    NP today  Heel slide 2x20  Hinge stretch with strap 4x15"   Knee ext LLLD 5# x5 min  HS strap stretch off EOT 2x30 sec  Gastroc strap stretch 2x30 sec      NEUROMUSCULAR RE-EDUCATION ACTIVITIES to improve Balance, Coordination, Kinesthetic, Sense, Proprioception, and Posture for (40) minutes.  The following were included:    SLR in long sitting 3x10 3#  SL hip abduction 3# 3x15  Knee ext machine 35# 4x15  SL bridge 4x10      NP today  HS curl machine 35# 4x10  Quad set 10" hold x20   Side steps 3 laps   DL bridge 4x10  Plank on SB 3x15"    THERAPEUTIC ACTIVITIES to improve dynamic and functional  performance for (17) minutes including:    Upright bike for 7 min for LE endurance  12" step up 4x8  SLDL 0# 4x10    NP today  5" forward step down 3x10  6" step up 3x10   Dowel hip hinge 3x20  Hip hinge w/ band resistance at waist 4x10    Patient Education and Home Exercises       Home Exercises Provided and Patient Education Provided     Education provided:   post op precautions, gait mechanics, POC, prognosis, return to activity, HEP     Written Home Exercises Provided: yes.  Exercises were reviewed and Fredy was able to demonstrate them prior to the end of the session.  Fredy demonstrated good  understanding of the education provided. See EMR under Patient Instructions for exercises provided during therapy sessions.    Assessment     Fredy with good carryover of ROM/ strength from last visit. Pt tolerates " progressed open/closed chain progressions well today. Pt educated on importance of continued loading outside of therapy if he dec frequency to every other week. Will continue to progress as tolerated.    Fredy is progressing well towards his goals.   Patient prognosis is Good.     Patient will continue to benefit from skilled outpatient physical therapy to address the deficits listed in the problem list box on initial evaluation, provide pt/family education and to maximize patient's level of independence in the home and community environment.     Patient's spiritual, cultural and educational needs considered and pt agreeable to plan of care and goals.     Anticipated barriers to physical therapy: work schedule      Goals:  Short Term Goals: 2 weeks     Pt will be independent with HEP and report compliance at least 4 days/week  Pt will demonstrate full passive knee extension and knee flexion to at least 90 deg  Pt will be able to perform 15 SLR without extension lag to demonstrate improved quad strength for gait    Mid term goals: within 12 weeks    Pt will be able to amb 30 min on level surface without gait deviations  Pt will be able to perform sit<>stand to standard chair x10 with equal weight shift  Pt will be able to perform all ADL's/ job responsibilities at [employer] without pain   Pt will demonstrate full symmetrical ROM on R vs L    Long term goals: with 9 months    Pt will demonstrate at least 90% quad strength on R vs L assessed on crane scale at 60 deg flexion  2.   Pt will demonstrate no greater than 4cm difference on anterior Y balance reach to demonstrate symmetrical LE function    Plan     Plan of care Certification: 11/13/2023 to 8/13/24.     Keagan Domínguez, PT, DPT

## 2024-02-29 ENCOUNTER — CLINICAL SUPPORT (OUTPATIENT)
Dept: REHABILITATION | Facility: HOSPITAL | Age: 54
End: 2024-02-29
Payer: COMMERCIAL

## 2024-02-29 DIAGNOSIS — M25.561 ACUTE PAIN OF RIGHT KNEE: Primary | ICD-10-CM

## 2024-02-29 PROCEDURE — 97112 NEUROMUSCULAR REEDUCATION: CPT | Performed by: PHYSICAL THERAPIST

## 2024-02-29 PROCEDURE — 97530 THERAPEUTIC ACTIVITIES: CPT | Performed by: PHYSICAL THERAPIST

## 2024-03-05 NOTE — PROGRESS NOTES
[facility]   Physical Therapy Treatment Note      [id number]    Therapy Diagnosis:   Encounter Diagnosis   Name Primary?    Acute pain of right knee Yes     Physician: Ryland Coffey MD     Physician Orders: PT Eval and Treat ACL tear  Medical Diagnosis from Referral: Rupture of anterior cruciate ligament of right knee, initial encounter [S83.511A]  Evaluation Date: 11/1/2023  Authorization Period Expiration: 12/31/23  Plan of Care Expiration: extend to 8/30/23  Progress Note Due: 4/1/24  Visit # / Visits authorized: 8/20   FOTO: 1/3    PROCEDURES(S) PERFORMED: 11/9/23  1. Right Arthroscopy, anterior cruciate ligament reconstruction 68989  2.  Right Arthroscopy, with meniscectomy (medial OR lateral) 09734, medial  3.  Right Arthroscopy, debridement/shaving of articular cartilage (chondroplasty) 80232  4.  Right Arthroscopy, with lysis of adhesions 81763  5.  Right Arthroscopy, knee, synovectomy, limited 07267     Precautions: Standard   PHYSICAL THERAPY:  The patient should begin physical therapy on postoperative   day #1-3 and will be advanced to outpatient therapy as soon as   Possible following discharge.     Weight bearing:as tolerated  right leg  Range of Motion:Full normal motion symmetric to opposite side  Immobilizer if present should be locked @-10 degrees with gait and allowed to flex to 120 degrees at rest.     Time In: 3:00 am   Time Out: 400 am   Total Billable Time: 60 minutes     Subjective     Patient reports:  knee has been feeling good. No issues with HEP    He was compliant with home exercise program.  Response to previous treatment: minimal soreness   Functional change: ADL's work duties approaching PLOF    Pain: 0/10     Location: right knee    Objective      POD #15 wks 0 days    Range of Motion:   Knee Right Left   Active 0-125 0-137   Passive 0- 137 heel slide 0-137   137 measured in prone    Function:    - quad set: Good  -  "SLR: no lag    Treatment     Fredy received the treatments listed below:     MANUAL THERAPY TECHNIQUES were applied for (00) minutes, including:  Patellar mobs all directions  Fat pad mob  Range of motion check   Extension hinge stretch  Lateral tibial glide Gr IV    Not performed today:     Flexion off EOT     THERAPEUTIC EXERCISES to develop strength, endurance, ROM, flexibility, posture, and core stabilization for (5) minutes including:      Hinge stretch with strap 4x15"   Standing quad stretch 3x30"    NP today  Heel slide 2x20  Knee ext LLLD 5# x5 min  HS strap stretch off EOT 2x30 sec  Gastroc strap stretch 2x30 sec      NEUROMUSCULAR RE-EDUCATION ACTIVITIES to improve Balance, Coordination, Kinesthetic, Sense, Proprioception, and Posture for (40) minutes.  The following were included:    SLR in long sitting 3x10 3#  SL hip abduction 3# 3x15  Deadbug 3x10  Knee ext machine 35# 4x15  SL bridge 4x10      NP today  HS curl machine 35# 4x10  Quad set 10" hold x20   Side steps 3 laps   DL bridge 4x10  Plank on SB 3x15"    THERAPEUTIC ACTIVITIES to improve dynamic and functional  performance for (15) minutes including:    Upright bike for 7 min for LE endurance  20# med ball hip hinge 4x10  SLDL 0# 4x10    NP today  12" step up 4x8  5" forward step down 3x10  6" step up 3x10   Dowel hip hinge 3x20  Hip hinge w/ band resistance at waist 4x10    Patient Education and Home Exercises       Home Exercises Provided and Patient Education Provided     Education provided:   post op precautions, gait mechanics, POC, prognosis, return to activity, HEP     Written Home Exercises Provided: yes.  Exercises were reviewed and Fredy was able to demonstrate them prior to the end of the session.  Fredy demonstrated good  understanding of the education provided. See EMR under Patient Instructions for exercises provided during therapy sessions.    Assessment     Fredy presents w/ full knee ROM. Pt tolerates progressed open/closed chain " strengthening well. Pt educated on progressing loading in between visits once dec freq to every other week. Will continue to progress strength as tolerated.    Fredy is progressing well towards his goals.   Patient prognosis is Good.     Patient will continue to benefit from skilled outpatient physical therapy to address the deficits listed in the problem list box on initial evaluation, provide pt/family education and to maximize patient's level of independence in the home and community environment.     Patient's spiritual, cultural and educational needs considered and pt agreeable to plan of care and goals.     Anticipated barriers to physical therapy: work schedule      Goals:  Short Term Goals: 2 weeks     Pt will be independent with HEP and report compliance at least 4 days/week  Pt will demonstrate full passive knee extension and knee flexion to at least 90 deg  Pt will be able to perform 15 SLR without extension lag to demonstrate improved quad strength for gait    Mid term goals: within 12 weeks    Pt will be able to amb 30 min on level surface without gait deviations  Pt will be able to perform sit<>stand to standard chair x10 with equal weight shift  Pt will be able to perform all ADL's/ job responsibilities at [employer] without pain   Pt will demonstrate full symmetrical ROM on R vs L    Long term goals: with 9 months    Pt will demonstrate at least 90% quad strength on R vs L assessed on crane scale at 60 deg flexion  2.   Pt will demonstrate no greater than 4cm difference on anterior Y balance reach to demonstrate symmetrical LE function    Plan     Plan of care Certification: 11/13/2023 to 8/13/24.     Keagan Domínguez, PT, DPT

## 2024-03-14 ENCOUNTER — CLINICAL SUPPORT (OUTPATIENT)
Dept: REHABILITATION | Facility: HOSPITAL | Age: 54
End: 2024-03-14
Payer: COMMERCIAL

## 2024-03-14 DIAGNOSIS — M25.561 ACUTE PAIN OF RIGHT KNEE: Primary | ICD-10-CM

## 2024-03-14 PROCEDURE — 97530 THERAPEUTIC ACTIVITIES: CPT | Performed by: PHYSICAL THERAPIST

## 2024-03-14 PROCEDURE — 97112 NEUROMUSCULAR REEDUCATION: CPT | Performed by: PHYSICAL THERAPIST
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
## 2024-04-04 ENCOUNTER — CLINICAL SUPPORT (OUTPATIENT)
Dept: REHABILITATION | Facility: HOSPITAL | Age: 54
End: 2024-04-04
Payer: COMMERCIAL

## 2024-04-04 DIAGNOSIS — M25.561 ACUTE PAIN OF RIGHT KNEE: Primary | ICD-10-CM

## 2024-04-04 PROCEDURE — 97140 MANUAL THERAPY 1/> REGIONS: CPT | Performed by: PHYSICAL THERAPIST

## 2024-04-04 PROCEDURE — 97112 NEUROMUSCULAR REEDUCATION: CPT | Performed by: PHYSICAL THERAPIST

## 2024-04-04 PROCEDURE — 97530 THERAPEUTIC ACTIVITIES: CPT | Performed by: PHYSICAL THERAPIST

## 2024-04-08 NOTE — PROGRESS NOTES
[facility]   Physical Therapy Treatment Note      [id number]    Therapy Diagnosis:   Encounter Diagnosis   Name Primary?    Acute pain of right knee Yes     Physician: Ryland Coffey MD     Physician Orders: PT Eval and Treat ACL tear  Medical Diagnosis from Referral: Rupture of anterior cruciate ligament of right knee, initial encounter [S83.511A]  Evaluation Date: 11/1/2023  Authorization Period Expiration: 12/31/23  Plan of Care Expiration: extend to 8/30/23  Progress Note Due: 4/1/24  Visit # / Visits authorized: 10/20   FOTO: 1/3    PROCEDURES(S) PERFORMED: 11/9/23  1. Right Arthroscopy, anterior cruciate ligament reconstruction 02122  2.  Right Arthroscopy, with meniscectomy (medial OR lateral) 70690, medial  3.  Right Arthroscopy, debridement/shaving of articular cartilage (chondroplasty) 28122  4.  Right Arthroscopy, with lysis of adhesions 36817  5.  Right Arthroscopy, knee, synovectomy, limited 68170     Precautions: Standard   PHYSICAL THERAPY:  The patient should begin physical therapy on postoperative   day #1-3 and will be advanced to outpatient therapy as soon as   Possible following discharge.     Weight bearing:as tolerated  right leg  Range of Motion:Full normal motion symmetric to opposite side  Immobilizer if present should be locked @-10 degrees with gait and allowed to flex to 120 degrees at rest.     Time In: 3:00 am   Time Out: 355 am   Total Billable Time: 55 minutes     Subjective     Patient reports:  felt good after last visit. Feels like he is approaching PLOF    He was compliant with home exercise program.  Response to previous treatment: minimal soreness   Functional change: ADL's work duties approaching PLOF    Pain: 0/10     Location: right knee    Objective      POD approx 5 months    Range of Motion:   Knee Right Left   Active 0-125 0-137   Passive 0- 137 heel slide 0-137   137 measured in prone    Function:    -  "quad set: Good  - SLR: no lag    Treatment     Fredy received the treatments listed below:     MANUAL THERAPY TECHNIQUES were applied for (00) minutes, including:  Patellar mobs all directions  Fat pad mob  Range of motion check   Extension hinge stretch  Lateral tibial glide Gr IV    Not performed today:     Flexion off EOT     THERAPEUTIC EXERCISES to develop strength, endurance, ROM, flexibility, posture, and core stabilization for (5) minutes including:    Standing quad stretch 3x30"    NP today  Hinge stretch with strap 4x15"   Heel slide 2x20  Knee ext LLLD 5# x5 min  HS strap stretch off EOT 2x30 sec  Gastroc strap stretch 2x30 sec      NEUROMUSCULAR RE-EDUCATION ACTIVITIES to improve Balance, Coordination, Kinesthetic, Sense, Proprioception, and Posture for (40) minutes.  The following were included:    SLR in long sitting 3x10 3#  SL hip abduction 3# 3x15  Deadbug 3x10  Knee ext machine 35# 4x15  DL bridge 4x10      NP today  HS curl machine 35# 4x10  Quad set 10" hold x20   Side steps 3 laps   DL bridge 4x10  Plank on SB 3x15"    THERAPEUTIC ACTIVITIES to improve dynamic and functional  performance for (15) minutes including:    Upright bike for 8 min for LE endurance  35# KB deadlift 4x10  12" step up 4x8    NP today  SLDL 0# 4x10  5" forward step down 3x10  6" step up 3x10   Dowel hip hinge 3x20  Hip hinge w/ band resistance at waist 4x10    Patient Education and Home Exercises       Home Exercises Provided and Patient Education Provided     Education provided:   post op precautions, gait mechanics, POC, prognosis, return to activity, HEP     Written Home Exercises Provided: yes.  Exercises were reviewed and Fredy was able to demonstrate them prior to the end of the session.  Fredy demonstrated good  understanding of the education provided. See EMR under Patient Instructions for exercises provided during therapy sessions.    Assessment     Fredy presents w/ full ROM. Pt with good carryover of hip hinge " form, tolerates progressed deadlift loading with reports of training effect in glutes. Will continue to progress strength as tolerated every other week    Fredy is progressing well towards his goals.   Patient prognosis is Good.     Patient will continue to benefit from skilled outpatient physical therapy to address the deficits listed in the problem list box on initial evaluation, provide pt/family education and to maximize patient's level of independence in the home and community environment.     Patient's spiritual, cultural and educational needs considered and pt agreeable to plan of care and goals.     Anticipated barriers to physical therapy: work schedule      Goals:  Short Term Goals: 2 weeks     Pt will be independent with HEP and report compliance at least 4 days/week  Pt will demonstrate full passive knee extension and knee flexion to at least 90 deg  Pt will be able to perform 15 SLR without extension lag to demonstrate improved quad strength for gait    Mid term goals: within 12 weeks    Pt will be able to amb 30 min on level surface without gait deviations  Pt will be able to perform sit<>stand to standard chair x10 with equal weight shift  Pt will be able to perform all ADL's/ job responsibilities at [employer] without pain   Pt will demonstrate full symmetrical ROM on R vs L    Long term goals: with 9 months    Pt will demonstrate at least 90% quad strength on R vs L assessed on crane scale at 60 deg flexion  2.   Pt will demonstrate no greater than 4cm difference on anterior Y balance reach to demonstrate symmetrical LE function    Plan     Plan of care Certification: 11/13/2023 to 8/13/24.     Keagan Domínguez, PT, DPT

## 2024-04-18 ENCOUNTER — CLINICAL SUPPORT (OUTPATIENT)
Dept: REHABILITATION | Facility: HOSPITAL | Age: 54
End: 2024-04-18
Payer: COMMERCIAL

## 2024-04-18 DIAGNOSIS — M25.561 ACUTE PAIN OF RIGHT KNEE: Primary | ICD-10-CM

## 2024-04-18 PROCEDURE — 97112 NEUROMUSCULAR REEDUCATION: CPT | Performed by: PHYSICAL THERAPIST

## 2024-04-18 PROCEDURE — 97530 THERAPEUTIC ACTIVITIES: CPT | Performed by: PHYSICAL THERAPIST
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
## 2024-05-03 ENCOUNTER — PATIENT MESSAGE (OUTPATIENT)
Dept: SPORTS MEDICINE | Facility: CLINIC | Age: 54
End: 2024-05-03
Payer: COMMERCIAL

## 2024-05-03 ENCOUNTER — TELEPHONE (OUTPATIENT)
Dept: SPORTS MEDICINE | Facility: CLINIC | Age: 54
End: 2024-05-03
Payer: COMMERCIAL

## 2024-05-03 NOTE — TELEPHONE ENCOUNTER
Left voicemail and portal message for patient regarding appointment on 5/6. Informed patient due to jury duty Dr. Coffey will not be in clinic that day so we will have to get appointment rescheduled.

## 2024-05-14 NOTE — PROGRESS NOTES
Subjective:          Chief Complaint: Fredy Bo is a 53 y.o. male who had concerns including Follow-up of the Right Knee.    Patient is s/p below procedures. He has been attending formal PT at [facility] 1x a week with Keagan. He is doing well today and has no pain.      Date of Procedure: 11/9/2023      Procedure: 1. Right  Arthroscopy, anterior cruciate ligament reconstruction 83686  2. Right   99866 Ligament knee reconstruction, extra-articular  2.  Right  Arthroscopy, with meniscectomy (medial OR lateral) 32930, medial  3.  Right  Arthroscopy, debridement/shaving of articular cartilage (chondroplasty) 89613  4.  Right  Arthroscopy, with lysis of adhesions 74101  5.  Right  Arthroscopy, knee, synovectomy, limited 58636     Surgeon(s) and Role:     * Ryland Coffey MD - Primary     Assisting Surgeon:      MD Indira Sexton,  [facility]          Review of Systems   Constitutional: Negative for chills and fever.   HENT:  Negative for congestion and sore throat.    Eyes:  Negative for discharge and double vision.   Cardiovascular:  Negative for chest pain, palpitations and syncope.   Respiratory:  Negative for cough and shortness of breath.    Endocrine: Negative for cold intolerance and heat intolerance.   Skin:  Negative for dry skin and rash.   Musculoskeletal:  Positive for joint pain.   Gastrointestinal:  Negative for abdominal pain, nausea and vomiting.   Neurological:  Negative for focal weakness, numbness and paresthesias.                   Objective:        General: Fredy is well-developed, well-nourished, appears stated age, in no acute distress, alert and oriented to time, place and person.     General    Vitals reviewed.  Constitutional: He is oriented to person, place, and time. He appears well-developed and well-nourished. No distress.   HENT:   Mouth/Throat: No oropharyngeal exudate.   Eyes: Right eye exhibits no discharge. Left eye exhibits no discharge.   Pulmonary/Chest: Effort  normal and breath sounds normal. No respiratory distress.   Neurological: He is alert and oriented to person, place, and time. He has normal reflexes. No cranial nerve deficit. Coordination normal.   Psychiatric: He has a normal mood and affect. His behavior is normal. Judgment and thought content normal.     General Musculoskeletal Exam   Gait: normal       Right Knee Exam     Inspection   Erythema: absent  Scars: absent  Swelling: absent  Effusion: absent  Deformity: absent  Bruising: absent    Tenderness   The patient is experiencing no tenderness.     Range of Motion   Extension:  0   Flexion:  140     Tests   Meniscus   Mikel:  Medial - negative Lateral - negative  Ligament Examination   Lachman: normal (-1 to 2mm)   PCL-Posterior Drawer: normal (0 to 2mm)     MCL - Valgus: normal (0 to 2mm)  LCL - Varus: normal  Pivot Shift: normal (Equal)  Reverse Pivot Shift: normal (Equal)  Dial Test at 30 degrees: normal (< 5 degrees)  Dial Test at 90 degrees: normal (< 5 degrees)  Posterior Sag Test: negative  Posterolateral Corner: stable  Patella   Patellar apprehension: negative  Passive Patellar Tilt: neutral  Patellar Tracking: normal  Patellar Glide (quadrants): Lateral - 1   Medial - 2  Q-Angle at 90 degrees: normal  Patellar Grind: negative  J-Sign: none    Other   Meniscal Cyst: absent  Popliteal (Baker's) Cyst: absent  Sensation: normal    Left Knee Exam     Inspection   Erythema: absent  Scars: absent  Swelling: absent  Effusion: absent  Deformity: absent  Bruising: absent    Tenderness   The patient is experiencing no tenderness.     Range of Motion   Extension:  0   Flexion:  140     Tests   Meniscus   Mikel:  Medial - negative Lateral - negative  Stability   Lachman: normal (-1 to 2mm)   PCL-Posterior Drawer: normal (0 to 2mm)  MCL - Valgus: normal (0 to 2mm)  LCL - Varus: normal (0 to 2mm)  Pivot Shift: normal (Equal)  Reverse Pivot Shift: normal (Equal)  Dial Test at 30 degrees: normal (< 5  degrees)  Dial Test at 90 degrees: normal (< 5 degrees)  Posterior Sag Test: negative  Posterolateral Corner: stable  Patella   Patellar apprehension: negative  Passive Patellar Tilt: neutral  Patellar Tracking: normal  Patellar Glide (Quadrants): Lateral - 1 Medial - 2  Q-Angle at 90 degrees: normal  Patellar Grind: negative  J-Sign: J sign absent    Other   Meniscal Cyst: absent  Popliteal (Baker's) Cyst: absent  Sensation: normal    Right Hip Exam     Tests   Justine: negative  Left Hip Exam     Tests   Justine: negative          Muscle Strength   Right Lower Extremity   Hip Abduction: 5/5   Quadriceps:  4/5   Hamstrin/5     Reflexes     Left Side  Achilles:  2+  Quadriceps:  2+    Right Side   Achilles:  2+  Quadriceps:  2+    Vascular Exam     Right Pulses  Dorsalis Pedis:      2+  Posterior Tibial:      2+        Left Pulses  Dorsalis Pedis:      2+  Posterior Tibial:      2+        Radiographic Findings:    X-ray Knee Ortho Bilateral with Flexion  Narrative: EXAMINATION:  XR KNEE ORTHO BILAT WITH FLEXION    CLINICAL HISTORY:  Other specified postprocedural states    TECHNIQUE:  AP standing of both knees, PA flexion standing views of both knees, and Merchant views of both knees were performed.  Lateral views of both knees were also performed.    COMPARISON:  2024    FINDINGS:  Status post right ACL repair.  No hardware failure.  Minimal mild tricompartment DJD changes particularly right intercondylar and right patellofemoral joint.  Impression: No fracture dislocation.  Additional findings above.    Electronically signed by: Christian Painter MD  Date:    05/15/2024  Time:    14:25        These findings were discussed and reviewed with the patient.           Assessment:       Encounter Diagnoses   Name Primary?    S/P ACL reconstruction Yes    Old complex tear of lateral meniscus of right knee     Rupture of anterior cruciate ligament of right knee, sequela     Chronic pain of right knee           Plan:        1. RTC in 6 months with Dr. Ryland Coffey. IKDC, SF-12 and KOOS was filled out today in clinic. Patient will fill out IKDC, SF-12 and KOOS on return.    2. Medications: Refills of the following Rx were sent to patients preferred Pharmacy:  No Refills Needed Today    3. Physical Therapy: Continue/Begin: Continue at [facility] with Keagan      4. HEP: N/A    5. Procedures/Procedural Planning:   N/A    6. DME: N/A    7. Work/Sport Status: works as  at [employer]     8. Visit Summary: Patient doing well 6 months postop                           [facility] patient questionnaires have been collected today.

## 2024-05-15 ENCOUNTER — HOSPITAL ENCOUNTER (OUTPATIENT)
Dept: RADIOLOGY | Facility: HOSPITAL | Age: 54
Discharge: HOME | End: 2024-05-15
Attending: ORTHOPAEDIC SURGERY
Payer: COMMERCIAL

## 2024-05-15 ENCOUNTER — OFFICE VISIT (OUTPATIENT)
Dept: SPORTS MEDICINE | Facility: CLINIC | Age: 54
End: 2024-05-15
Payer: COMMERCIAL

## 2024-05-15 ENCOUNTER — CLINICAL SUPPORT (OUTPATIENT)
Dept: REHABILITATION | Facility: HOSPITAL | Age: 54
End: 2024-05-15
Payer: COMMERCIAL

## 2024-05-15 VITALS
HEART RATE: 92 BPM | BODY MASS INDEX: 35.03 KG/M2 | SYSTOLIC BLOOD PRESSURE: 142 MMHG | WEIGHT: 244.69 LBS | DIASTOLIC BLOOD PRESSURE: 83 MMHG | HEIGHT: 70 IN

## 2024-05-15 DIAGNOSIS — M25.561 CHRONIC PAIN OF RIGHT KNEE: ICD-10-CM

## 2024-05-15 DIAGNOSIS — Z98.890 S/P ACL RECONSTRUCTION: ICD-10-CM

## 2024-05-15 DIAGNOSIS — S83.511S RUPTURE OF ANTERIOR CRUCIATE LIGAMENT OF RIGHT KNEE, SEQUELA: ICD-10-CM

## 2024-05-15 DIAGNOSIS — M23.200 OLD COMPLEX TEAR OF LATERAL MENISCUS OF RIGHT KNEE: ICD-10-CM

## 2024-05-15 DIAGNOSIS — G89.29 CHRONIC PAIN OF RIGHT KNEE: ICD-10-CM

## 2024-05-15 DIAGNOSIS — Z98.890 S/P ACL RECONSTRUCTION: Primary | ICD-10-CM

## 2024-05-15 DIAGNOSIS — M25.561 ACUTE PAIN OF RIGHT KNEE: Primary | ICD-10-CM

## 2024-05-15 PROCEDURE — 99999 PR PBB SHADOW E&M-EST. PATIENT-LVL IV: CPT | Mod: PBBFAC,,, | Performed by: ORTHOPAEDIC SURGERY

## 2024-05-15 PROCEDURE — 73564 X-RAY EXAM KNEE 4 OR MORE: CPT | Mod: TC,50

## 2024-05-15 PROCEDURE — 4010F ACE/ARB THERAPY RXD/TAKEN: CPT | Mod: CPTII,S$GLB,, | Performed by: ORTHOPAEDIC SURGERY

## 2024-05-15 PROCEDURE — 3077F SYST BP >= 140 MM HG: CPT | Mod: CPTII,S$GLB,, | Performed by: ORTHOPAEDIC SURGERY

## 2024-05-15 PROCEDURE — 1160F RVW MEDS BY RX/DR IN RCRD: CPT | Mod: CPTII,S$GLB,, | Performed by: ORTHOPAEDIC SURGERY

## 2024-05-15 PROCEDURE — 97110 THERAPEUTIC EXERCISES: CPT | Performed by: PHYSICAL THERAPIST

## 2024-05-15 PROCEDURE — 97112 NEUROMUSCULAR REEDUCATION: CPT | Performed by: PHYSICAL THERAPIST

## 2024-05-15 PROCEDURE — 3079F DIAST BP 80-89 MM HG: CPT | Mod: CPTII,S$GLB,, | Performed by: ORTHOPAEDIC SURGERY

## 2024-05-15 PROCEDURE — 1159F MED LIST DOCD IN RCRD: CPT | Mod: CPTII,S$GLB,, | Performed by: ORTHOPAEDIC SURGERY

## 2024-05-15 PROCEDURE — 73564 X-RAY EXAM KNEE 4 OR MORE: CPT | Mod: 26,,, | Performed by: RADIOLOGY

## 2024-05-15 PROCEDURE — 3008F BODY MASS INDEX DOCD: CPT | Mod: CPTII,S$GLB,, | Performed by: ORTHOPAEDIC SURGERY

## 2024-05-15 PROCEDURE — 99214 OFFICE O/P EST MOD 30 MIN: CPT | Mod: S$GLB,,, | Performed by: ORTHOPAEDIC SURGERY

## 2024-05-15 PROCEDURE — 97530 THERAPEUTIC ACTIVITIES: CPT | Performed by: PHYSICAL THERAPIST

## 2024-05-15 RX ORDER — ATORVASTATIN CALCIUM 10 MG/1
10 TABLET, FILM COATED ORAL DAILY
COMMUNITY

## 2024-05-15 NOTE — PROGRESS NOTES
[facility]   Physical Therapy Treatment Note      [id number]    Therapy Diagnosis:   Encounter Diagnosis   Name Primary?    Acute pain of right knee Yes       Physician: Ryland Coffey MD     Physician Orders: PT Eval and Treat ACL tear  Medical Diagnosis from Referral: Rupture of anterior cruciate ligament of right knee, initial encounter [S83.511A]  Evaluation Date: 11/1/2023  Authorization Period Expiration: 12/31/23  Plan of Care Expiration: extend to 8/30/23  Progress Note Due: 4/1/24  Visit # / Visits authorized: 12/20   FOTO: 1/3    PROCEDURES(S) PERFORMED: 11/9/23  1. Right Arthroscopy, anterior cruciate ligament reconstruction 17537  2.  Right Arthroscopy, with meniscectomy (medial OR lateral) 79418, medial  3.  Right Arthroscopy, debridement/shaving of articular cartilage (chondroplasty) 77845  4.  Right Arthroscopy, with lysis of adhesions 01472  5.  Right Arthroscopy, knee, synovectomy, limited 18222     Precautions: Standard   PHYSICAL THERAPY:  The patient should begin physical therapy on postoperative   day #1-3 and will be advanced to outpatient therapy as soon as   Possible following discharge.     Weight bearing:as tolerated  right leg  Range of Motion:Full normal motion symmetric to opposite side  Immobilizer if present should be locked @-10 degrees with gait and allowed to flex to 120 degrees at rest.     Time In: 245 pm   Time Out: 345 pm   Total Billable Time: 60 minutes     Subjective     Patient reports:  he has been doing well since last visit. Going to gym at work 1-2 days/ week. Had f/u with Dr. Coffey, he was okay with pt dropping to 1x/month    He was compliant with home exercise program.  Response to previous treatment: minimal soreness   Functional change: ADL's work duties approaching PLOF    Pain: 0/10     Location: right knee    Objective      POD approx 6 months 1 wks     Range of Motion:   Knee Right Left   Active  "0-125 0-137   Passive 0- 137 heel slide 0-137   137 measured in prone    Function:    - quad set: Good  - SLR: no lag    HHD at 60 deg knee flexion  Knee Right Left   Extension  41 kg 40 kg   Flexion 21 kg 35 kg     Treatment     Fredy received the treatments listed below:     MANUAL THERAPY TECHNIQUES were applied for (00) minutes, including:  Patellar mobs all directions  Fat pad mob  Range of motion check   Extension hinge stretch  Lateral tibial glide Gr IV    Not performed today:     Flexion off EOT     THERAPEUTIC EXERCISES to develop strength, endurance, ROM, flexibility, posture, and core stabilization for (10) minutes including:    Tests and measures  Standing quad stretch 3x30"    NP today  Hinge stretch with strap 4x15"   Heel slide 2x20  Knee ext LLLD 5# x5 min  HS strap stretch off EOT 2x30 sec  Gastroc strap stretch 2x30 sec      NEUROMUSCULAR RE-EDUCATION ACTIVITIES to improve Balance, Coordination, Kinesthetic, Sense, Proprioception, and Posture for (35) minutes.  The following were included:    Quad set 10" hold x20   SLR in long sitting 3x10 0#  Knee ext machine 35# 4x15  HS curl machine 35# 4x10  SL bridge 3x10  Standing HS curl 5# 4x10    NP today  Deadbug 3x10  SL hip abduction 3# 3x15  Side steps 3 laps   DL bridge 4x10  Plank on SB 3x15"    THERAPEUTIC ACTIVITIES to improve dynamic and functional  performance for (15) minutes including:    Upright bike for 8 min for LE endurance  15# med ball deadlift 4x10  SLDL 0# 4x10    NP today  Sled push/pull 4 laps 135#  12" step up 4x8  5" forward step down 3x10  6" step up 3x10   Dowel hip hinge 3x20  Hip hinge w/ band resistance at waist 4x10    Patient Education and Home Exercises       Home Exercises Provided and Patient Education Provided     Education provided:   post op precautions, gait mechanics, POC, prognosis, return to activity, HEP     Written Home Exercises Provided: yes.  Exercises were reviewed and Fredy was able to demonstrate them " prior to the end of the session.  Fredy demonstrated good  understanding of the education provided. See EMR under Patient Instructions for exercises provided during therapy sessions.    Assessment     Fredy with good quad symmetry assessed isomerically. Pt demonstrates dec HS strength. Pt tolerates progressed HS loading well with training effect but no pain reported. Will continue strengthening 1x/month.    Fredy is progressing well towards his goals.   Patient prognosis is Good.     Patient will continue to benefit from skilled outpatient physical therapy to address the deficits listed in the problem list box on initial evaluation, provide pt/family education and to maximize patient's level of independence in the home and community environment.     Patient's spiritual, cultural and educational needs considered and pt agreeable to plan of care and goals.     Anticipated barriers to physical therapy: work schedule      Goals:  Short Term Goals: 2 weeks     Pt will be independent with HEP and report compliance at least 4 days/week  Pt will demonstrate full passive knee extension and knee flexion to at least 90 deg  Pt will be able to perform 15 SLR without extension lag to demonstrate improved quad strength for gait    Mid term goals: within 12 weeks    Pt will be able to amb 30 min on level surface without gait deviations  Pt will be able to perform sit<>stand to standard chair x10 with equal weight shift  Pt will be able to perform all ADL's/ job responsibilities at PLOF without pain   Pt will demonstrate full symmetrical ROM on R vs L    Long term goals: with 9 months    Pt will demonstrate at least 90% quad strength on R vs L assessed on crane scale at 60 deg flexion  2.   Pt will demonstrate no greater than 4cm difference on anterior Y balance reach to demonstrate symmetrical LE function    Plan     Plan of care Certification: 11/13/2023 to 8/13/24.     Keagan Domínguez, PT, DPT

## 2024-06-20 ENCOUNTER — CLINICAL SUPPORT (OUTPATIENT)
Dept: REHABILITATION | Facility: HOSPITAL | Age: 54
End: 2024-06-20
Payer: COMMERCIAL

## 2024-06-20 DIAGNOSIS — M25.561 ACUTE PAIN OF RIGHT KNEE: Primary | ICD-10-CM

## 2024-06-20 PROCEDURE — 97110 THERAPEUTIC EXERCISES: CPT | Performed by: PHYSICAL THERAPIST

## 2024-06-20 PROCEDURE — 97112 NEUROMUSCULAR REEDUCATION: CPT | Performed by: PHYSICAL THERAPIST

## 2024-06-20 NOTE — PROGRESS NOTES
[facility]   Physical Therapy Treatment Note      Name: Fredy Bo  [facility] Number: [id number]    Therapy Diagnosis:   Encounter Diagnosis   Name Primary?    Acute pain of right knee Yes     Physician: Ryland Coffey MD     Physician Orders: PT Eval and Treat ACL tear  Medical Diagnosis from Referral: Rupture of anterior cruciate ligament of right knee, initial encounter [S83.511A]  Evaluation Date: 11/1/2023  Authorization Period Expiration: 12/31/23  Plan of Care Expiration: extend to 8/30/23  Progress Note Due: 4/1/24  Visit # / Visits authorized: 13/20   FOTO: 1/3    PROCEDURES(S) PERFORMED: 11/9/23  1. Right Arthroscopy, anterior cruciate ligament reconstruction 49237  2.  Right Arthroscopy, with meniscectomy (medial OR lateral) 12666, medial  3.  Right Arthroscopy, debridement/shaving of articular cartilage (chondroplasty) 08360  4.  Right Arthroscopy, with lysis of adhesions 59958  5.  Right Arthroscopy, knee, synovectomy, limited 96714     Precautions: Standard   PHYSICAL THERAPY:  The patient should begin physical therapy on postoperative   day #1-3 and will be advanced to outpatient therapy as soon as   Possible following discharge.     Weight bearing:as tolerated  right leg  Range of Motion:Full normal motion symmetric to opposite side  Immobilizer if present should be locked @-10 degrees with gait and allowed to flex to 120 degrees at rest.     Time In: 330 pm   Time Out: 405 pm   Total Billable Time: 35 minutes     Subjective     Patient reports:  knee has been feeling good. Difficulty getting to work gym consistently but doing HEP, approaching PLOF with day to day tasks.    He was compliant with home exercise program.  Response to previous treatment: minimal soreness   Functional change: ADL's work duties approaching PLOF    Pain: 0/10     Location: right knee    Objective      POD approx 7 months 2 wks     Range of Motion:   Knee Right Left   Active 0-125 0-137  "  Passive 0- 137 heel slide 0-137   137 measured in prone    Function:    - quad set: Good  - SLR: no lag    5/15/25  HHD at 60 deg knee flexion  Knee Right Left   Extension  41 kg 40 kg   Flexion 21 kg 35 kg     6/20/24   Knee Extension iso at 60° (kg)       Right Left     1 54.1 55.3     2 51.5 59.1     3 52.1 62.1               Avg 52.6 58.8       112%       89%               Knee Flexion iso at 60° (kg)       Right Left     1 22.7 37.4     2 25.1 36.2     3 26.6 36.3               Avg 24.8 36.6       148%       69%       Treatment     Fredy received the treatments listed below:     MANUAL THERAPY TECHNIQUES were applied for (00) minutes, including:  Patellar mobs all directions  Fat pad mob  Range of motion check   Extension hinge stretch  Lateral tibial glide Gr IV    Not performed today:     Flexion off EOT     THERAPEUTIC EXERCISES to develop strength, endurance, ROM, flexibility, posture, and core stabilization for (10) minutes including:    Tests and measures  Standing quad stretch 3x30"    NP today  Hinge stretch with strap 4x15"   Heel slide 2x20  Knee ext LLLD 5# x5 min  HS strap stretch off EOT 2x30 sec  Gastroc strap stretch 2x30 sec      NEUROMUSCULAR RE-EDUCATION ACTIVITIES to improve Balance, Coordination, Kinesthetic, Sense, Proprioception, and Posture for (25) minutes.  The following were included:    Quad set 10" hold x20   SLR in long sitting 3x10 3#  SL hip abduction 3# 3x15  Knee ext machine 35# 4x15  HS curl machine 35# 4x10  SL bridge 3x10      NP today  Deadbug 3x10  Standing HS curl 5# 4x10  Side steps 3 laps   DL bridge 4x10  Plank on SB 3x15"    THERAPEUTIC ACTIVITIES to improve dynamic and functional  performance for (00) minutes including:    Upright bike for 8 min for LE endurance  15# med ball deadlift 4x10  SLDL 0# 4x10    NP today  Sled push/pull 4 laps 135#  12" step up 4x8  5" forward step down 3x10  6" step up 3x10   Dowel hip hinge 3x20  Hip hinge w/ band resistance at waist " 4x10    Patient Education and Home Exercises       Home Exercises Provided and Patient Education Provided     Education provided:   post op precautions, gait mechanics, POC, prognosis, return to activity, HEP     Written Home Exercises Provided: yes.  Exercises were reviewed and Fredy was able to demonstrate them prior to the end of the session.  Fredy demonstrated good  understanding of the education provided. See EMR under Patient Instructions for exercises provided during therapy sessions.    Assessment     Fredy presents with good carryover of ROM. Improved quad strength bilat, slightly less symmetry than last time measured. Pt educated on importance of including open chain quad strength into routine. Will re-assess in 1 month and progress HEP as indicated    Fredy is progressing well towards his goals.   Patient prognosis is Good.     Patient will continue to benefit from skilled outpatient physical therapy to address the deficits listed in the problem list box on initial evaluation, provide pt/family education and to maximize patient's level of independence in the home and community environment.     Patient's spiritual, cultural and educational needs considered and pt agreeable to plan of care and goals.     Anticipated barriers to physical therapy: work schedule      Goals:  Short Term Goals: 2 weeks     Pt will be independent with HEP and report compliance at least 4 days/week  Pt will demonstrate full passive knee extension and knee flexion to at least 90 deg  Pt will be able to perform 15 SLR without extension lag to demonstrate improved quad strength for gait    Mid term goals: within 12 weeks    Pt will be able to amb 30 min on level surface without gait deviations  Pt will be able to perform sit<>stand to standard chair x10 with equal weight shift  Pt will be able to perform all ADL's/ job responsibilities at PLOF without pain   Pt will demonstrate full symmetrical ROM on R vs L    Long term goals: with  9 months    Pt will demonstrate at least 90% quad strength on R vs L assessed on crane scale at 60 deg flexion  2.   Pt will demonstrate no greater than 4cm difference on anterior Y balance reach to demonstrate symmetrical LE function    Plan     Plan of care Certification: 11/13/2023 to 8/13/24.     Keagan Domínguez, PT, DPT

## 2024-08-23 NOTE — PROGRESS NOTES
Subjective:          Chief Complaint: Fredy Bo is a 53 y.o. male who had concerns including Injury of the Right Knee.    Patient is s/p below procedures. He has been attending formal PT at [facility] 1x a week with Keagan. He is doing well today and has no pain.      Date of Procedure: 11/9/2023      Procedure: 1. Right  Arthroscopy, anterior cruciate ligament reconstruction 44460  2. Right   25635 Ligament knee reconstruction, extra-articular  2.  Right  Arthroscopy, with meniscectomy (medial OR lateral) 20456, medial  3.  Right  Arthroscopy, debridement/shaving of articular cartilage (chondroplasty) 29347  4.  Right  Arthroscopy, with lysis of adhesions 15415  5.  Right  Arthroscopy, knee, synovectomy, limited 52447     Surgeon(s) and Role:     * Ryland Coffey MD - Primary     Assisting Surgeon:      MD Indira Sexton,  [facility]          Review of Systems   Constitutional: Negative for chills and fever.   HENT:  Negative for congestion and sore throat.    Eyes:  Negative for discharge and double vision.   Cardiovascular:  Negative for chest pain, palpitations and syncope.   Respiratory:  Negative for cough and shortness of breath.    Endocrine: Negative for cold intolerance and heat intolerance.   Skin:  Negative for dry skin and rash.   Musculoskeletal:  Positive for joint pain.   Gastrointestinal:  Negative for abdominal pain, nausea and vomiting.   Neurological:  Negative for focal weakness, numbness and paresthesias.                   Objective:        General: Fredy is well-developed, well-nourished, appears stated age, in no acute distress, alert and oriented to time, place and person.     General    Vitals reviewed.  Constitutional: He is oriented to person, place, and time. He appears well-developed and well-nourished. No distress.   HENT:   Mouth/Throat: No oropharyngeal exudate.   Eyes: Right eye exhibits no discharge. Left eye exhibits no discharge.   Pulmonary/Chest: Effort  normal and breath sounds normal. No respiratory distress.   Neurological: He is alert and oriented to person, place, and time. He has normal reflexes. No cranial nerve deficit. Coordination normal.   Psychiatric: He has a normal mood and affect. His behavior is normal. Judgment and thought content normal.     General Musculoskeletal Exam   Gait: normal       Right Knee Exam     Inspection   Erythema: absent  Scars: absent  Swelling: absent  Effusion: absent  Deformity: absent  Bruising: absent    Tenderness   The patient is experiencing no tenderness.     Range of Motion   Extension:  0   Flexion:  140     Tests   Meniscus   Mikel:  Medial - negative Lateral - negative  Ligament Examination   Lachman: normal (-1 to 2mm)   PCL-Posterior Drawer: normal (0 to 2mm)     MCL - Valgus: normal (0 to 2mm)  LCL - Varus: normal  Pivot Shift: normal (Equal)  Reverse Pivot Shift: normal (Equal)  Dial Test at 30 degrees: normal (< 5 degrees)  Dial Test at 90 degrees: normal (< 5 degrees)  Posterior Sag Test: negative  Posterolateral Corner: stable  Patella   Patellar apprehension: negative  Passive Patellar Tilt: neutral  Patellar Tracking: normal  Patellar Glide (quadrants): Lateral - 1   Medial - 2  Q-Angle at 90 degrees: normal  Patellar Grind: negative  J-Sign: none    Other   Meniscal Cyst: absent  Popliteal (Baker's) Cyst: absent  Sensation: normal    Left Knee Exam     Inspection   Erythema: absent  Scars: absent  Swelling: absent  Effusion: absent  Deformity: absent  Bruising: absent    Tenderness   The patient is experiencing no tenderness.     Range of Motion   Extension:  0   Flexion:  140     Tests   Meniscus   Mikel:  Medial - negative Lateral - negative  Stability   Lachman: normal (-1 to 2mm)   PCL-Posterior Drawer: normal (0 to 2mm)  MCL - Valgus: normal (0 to 2mm)  LCL - Varus: normal (0 to 2mm)  Pivot Shift: normal (Equal)  Reverse Pivot Shift: normal (Equal)  Dial Test at 30 degrees: normal (< 5  degrees)  Dial Test at 90 degrees: normal (< 5 degrees)  Posterior Sag Test: negative  Posterolateral Corner: stable  Patella   Patellar apprehension: negative  Passive Patellar Tilt: neutral  Patellar Tracking: normal  Patellar Glide (Quadrants): Lateral - 1 Medial - 2  Q-Angle at 90 degrees: normal  Patellar Grind: negative  J-Sign: J sign absent    Other   Meniscal Cyst: absent  Popliteal (Baker's) Cyst: absent  Sensation: normal    Right Hip Exam     Tests   Justine: negative  Left Hip Exam     Tests   Justine: negative          Muscle Strength   Right Lower Extremity   Hip Abduction: 5/5   Quadriceps:  4/5   Hamstrin/5     Reflexes     Left Side  Achilles:  2+  Quadriceps:  2+    Right Side   Achilles:  2+  Quadriceps:  2+    Vascular Exam     Right Pulses  Dorsalis Pedis:      2+  Posterior Tibial:      2+        Left Pulses  Dorsalis Pedis:      2+  Posterior Tibial:      2+        Radiographic Findings:    X-ray Knee Ortho Bilateral with Flexion  Narrative: EXAMINATION:  XR KNEE ORTHO BILAT WITH FLEXION    CLINICAL HISTORY:  Pain in right knee    TECHNIQUE:  AP standing of both knees, PA flexion standing views of both knees, and Merchant views of both knees were performed.  Lateral views of both knees were also performed.    COMPARISON:  N 05/15/2024 one    FINDINGS:  Postoperative changes of ACL repair involving the right knee identified.  The position alignment is satisfactory and unchanged as compared to the previous study.    The joint spaces are well maintained.  No fracture or dislocation.  No bone destruction identified  Impression: See above    Electronically signed by: Luisito Zaidi MD  Date:    2024  Time:    12:37        These findings were discussed and reviewed with the patient.           Assessment:       Encounter Diagnoses   Name Primary?    Acute bilateral knee pain Yes    S/P ACL reconstruction             Plan:       1. RTC in 6 months with Dr. Ryland Coffey. IKDC, SF-12 and KOOS was  filled out today in clinic. Patient will fill out IKDC, SF-12 and KOOS on return.    2. Medications: Refills of the following Rx were sent to patients preferred Pharmacy:  No Refills Needed Today    3. Physical Therapy: N/A     4. HEP: N/A    5. Procedures/Procedural Planning:   N/A    6. DME: N/A    7. Work/Sport Status: works as  at [employer]     8. Visit Summary: Patient doing well 6 months postop. Will return to clinic in 6 months.                           [facility] patient questionnaires have been collected today.

## 2024-08-26 ENCOUNTER — HOSPITAL ENCOUNTER (OUTPATIENT)
Dept: RADIOLOGY | Facility: HOSPITAL | Age: 54
Discharge: HOME | End: 2024-08-26
Attending: ORTHOPAEDIC SURGERY
Payer: COMMERCIAL

## 2024-08-26 ENCOUNTER — OFFICE VISIT (OUTPATIENT)
Dept: SPORTS MEDICINE | Facility: CLINIC | Age: 54
End: 2024-08-26
Payer: COMMERCIAL

## 2024-08-26 VITALS
HEART RATE: 90 BPM | BODY MASS INDEX: 34.13 KG/M2 | DIASTOLIC BLOOD PRESSURE: 88 MMHG | WEIGHT: 237.88 LBS | SYSTOLIC BLOOD PRESSURE: 136 MMHG

## 2024-08-26 DIAGNOSIS — M25.562 ACUTE BILATERAL KNEE PAIN: Primary | ICD-10-CM

## 2024-08-26 DIAGNOSIS — Z98.890 S/P ACL RECONSTRUCTION: ICD-10-CM

## 2024-08-26 DIAGNOSIS — M25.562 ACUTE BILATERAL KNEE PAIN: ICD-10-CM

## 2024-08-26 DIAGNOSIS — M25.561 ACUTE BILATERAL KNEE PAIN: ICD-10-CM

## 2024-08-26 DIAGNOSIS — M25.561 ACUTE BILATERAL KNEE PAIN: Primary | ICD-10-CM

## 2024-08-26 PROCEDURE — 99999 PR PBB SHADOW E&M-EST. PATIENT-LVL III: CPT | Mod: PBBFAC,,, | Performed by: ORTHOPAEDIC SURGERY

## 2024-08-26 PROCEDURE — 73564 X-RAY EXAM KNEE 4 OR MORE: CPT | Mod: 26,,, | Performed by: RADIOLOGY

## 2024-08-26 PROCEDURE — 99214 OFFICE O/P EST MOD 30 MIN: CPT | Mod: S$GLB,,, | Performed by: ORTHOPAEDIC SURGERY

## 2024-08-26 PROCEDURE — 4010F ACE/ARB THERAPY RXD/TAKEN: CPT | Mod: CPTII,S$GLB,, | Performed by: ORTHOPAEDIC SURGERY

## 2024-08-26 PROCEDURE — 3008F BODY MASS INDEX DOCD: CPT | Mod: CPTII,S$GLB,, | Performed by: ORTHOPAEDIC SURGERY

## 2024-08-26 PROCEDURE — 3079F DIAST BP 80-89 MM HG: CPT | Mod: CPTII,S$GLB,, | Performed by: ORTHOPAEDIC SURGERY

## 2024-08-26 PROCEDURE — 73564 X-RAY EXAM KNEE 4 OR MORE: CPT | Mod: TC,50

## 2024-08-26 PROCEDURE — 1159F MED LIST DOCD IN RCRD: CPT | Mod: CPTII,S$GLB,, | Performed by: ORTHOPAEDIC SURGERY

## 2024-08-26 PROCEDURE — 3075F SYST BP GE 130 - 139MM HG: CPT | Mod: CPTII,S$GLB,, | Performed by: ORTHOPAEDIC SURGERY

## 2024-08-30 PROBLEM — M25.562 ACUTE BILATERAL KNEE PAIN: Status: ACTIVE | Noted: 2018-06-06

## (undated) DEVICE — COVER LIGHT HANDLE 80/CA

## (undated) DEVICE — BNDG COFLEX FOAM LF2 ST 6X5YD

## (undated) DEVICE — DRESSING AQUACEL AG ADV 3.5X6

## (undated) DEVICE — PAD ELECTRODE STER 1.5X3

## (undated) DEVICE — PAD COLD THERAPY KNEE WRAP ON

## (undated) DEVICE — PADDING CAST 6IN DELTA ROLL

## (undated) DEVICE — GAUZE SPONGE 4X4 12PLY

## (undated) DEVICE — SYS CLSR DERMABOND PRINEO 22CM

## (undated) DEVICE — UNDERGLOVES BIOGEL PI SIZE 8.5

## (undated) DEVICE — BLADE 4.2MM PREBENT ULTRACUT

## (undated) DEVICE — SUT FIBERWIRE

## (undated) DEVICE — CANNULA PASSPORT 8 MM X 3CM

## (undated) DEVICE — NDL 22GA X1 1/2 REG BEVEL

## (undated) DEVICE — ELECTRODE 90 DEGREE ANGLE

## (undated) DEVICE — SUT SUTURETAPE TIGERLINK 1.3MM

## (undated) DEVICE — TUBE SET INFLOW/OUTFLOW

## (undated) DEVICE — WRAP KNEE ACCU THERM GEL PACK

## (undated) DEVICE — SUT FIBERWIRE FIBER 2M

## (undated) DEVICE — GOWN SMARTSLEEVE XXL/XLONG

## (undated) DEVICE — SPEEDTRAP GRAFT PREPARATION SYSTEM

## (undated) DEVICE — DRESSING XEROFORM 1X8IN

## (undated) DEVICE — ADHESIVE DERMABOND ADVANCED

## (undated) DEVICE — BLADE SURG #15 CARBON STEEL

## (undated) DEVICE — SUT ETHILON 4-0 PS2 18 BLK

## (undated) DEVICE — SEE MEDLINE ITEM 152530

## (undated) DEVICE — UNDERGLOVES BIOGEL PI SZ 7 LF

## (undated) DEVICE — CONTAINER SPECIMEN STRL 4OZ

## (undated) DEVICE — DRAPE STERI INSTRUMENT 1018

## (undated) DEVICE — RETRIEVER SUTURE HEWSON DISP

## (undated) DEVICE — DRESSING XEROFORM GAUZE 5X9

## (undated) DEVICE — MARKER SKIN RULER STERILE

## (undated) DEVICE — SEE MEDLINE ITEM 157126

## (undated) DEVICE — SUT MCRYL PLUS 4-0 PS2 27IN

## (undated) DEVICE — NDL 18GA X1 1/2 REG BEVEL

## (undated) DEVICE — SUT VICRYL 2-0 27 CT-1

## (undated) DEVICE — SHAVER ULTRAFFR 4.2MM

## (undated) DEVICE — SEE MEDLINE ITEM 157150

## (undated) DEVICE — DRESSING AQUACEL ADH 4X10IN

## (undated) DEVICE — SYR 30CC LUER LOCK

## (undated) DEVICE — PAD CAST SPECIALIST STRL 6

## (undated) DEVICE — GOWN B1 X-LG X-LONG

## (undated) DEVICE — Device

## (undated) DEVICE — GLOVE BIOGEL SKINSENSE PI 7.0

## (undated) DEVICE — ELECTRODE REM PLYHSV RETURN 9

## (undated) DEVICE — PAD ABDOMINAL STERILE 8X10IN

## (undated) DEVICE — SET DECANTER MEDICHOICE

## (undated) DEVICE — SEE MEDLINE ITEM 157169

## (undated) DEVICE — LANZA BLADE

## (undated) DEVICE — GAUZE SPONGE 4'X4 12 PLY

## (undated) DEVICE — UNDERGLOVES BIOGEL PI SIZE 7.5

## (undated) DEVICE — SOL 9P NACL IRR PIC IL

## (undated) DEVICE — SUT 2 20 FIBERLOOP STR NDL

## (undated) DEVICE — DRAPE ARTHSCP T ORTHOMAX POUCH

## (undated) DEVICE — SUT 4.0 ETHILON

## (undated) DEVICE — PAD ABD 8X10 STERILE

## (undated) DEVICE — REAMER 4.5MM GRAFTMAX FLEX CH

## (undated) DEVICE — NDL 26.5 TAPR CRV XLOOP

## (undated) DEVICE — SUT 4-0 ETHILON 18 PS-2

## (undated) DEVICE — APPLICATOR CHLORAPREP ORN 26ML

## (undated) DEVICE — DRESSING AQUACEL AG 3.5X10IN

## (undated) DEVICE — SPONGE LAP 18X18 PREWASHED

## (undated) DEVICE — SOL NACL IRR 3000ML

## (undated) DEVICE — TOWEL OR DISP STRL BLUE 4/PK

## (undated) DEVICE — KIT ACL DISP W/O SAW BLADE

## (undated) DEVICE — SEE MEDLINE ITEM 159592

## (undated) DEVICE — NDL SAFETY 22G X 1.5 ECLIPSE

## (undated) DEVICE — SUT VICRYL 3-0 27 SH

## (undated) DEVICE — NDL SUREFIRE SCORPION RC

## (undated) DEVICE — TOURNIQUET SB QC SP 34X4IN

## (undated) DEVICE — SOL IRR NACL .9% 3000ML

## (undated) DEVICE — SUT VICRYL+ 1 CT1 18IN

## (undated) DEVICE — SUT FIBERWIRE LOOP STRAIGHT

## (undated) DEVICE — BRACE KNEE T SCOPE PREMIER

## (undated) DEVICE — DRAPE SURG W/TWL 17 5/8X23

## (undated) DEVICE — TOURNIQUET SB QC DP 34X4IN

## (undated) DEVICE — BLADE SURG STAINLESS STEEL #15

## (undated) DEVICE — GOWN ECLIPSE REINF L4 XLNG XXL

## (undated) DEVICE — SEE MEDLINE ITEM 157131

## (undated) DEVICE — DRAPE THREE-QTR REINF 53X77IN

## (undated) DEVICE — SHAVER SYS 5.5 ULRAFRR

## (undated) DEVICE — COVER CAMERA OPERATING ROOM

## (undated) DEVICE — GLOVE BIOGEL SKINSENSE PI 8.5

## (undated) DEVICE — SUT VICRYL PLUS 3-0 SH 18IN

## (undated) DEVICE — DRAPE STERI U-SHAPED 47X51IN

## (undated) DEVICE — GOWN ECLIPSE REINF LV4 XLNG XL

## (undated) DEVICE — DRESSING XEROFORM NONADH 1X8IN

## (undated) DEVICE — SUT PDS VIL/BLU DUAL ORTHO

## (undated) DEVICE — GLOVE SENSICARE PI SURG 8.5

## (undated) DEVICE — PENCIL ELECTROSURG HOLST W/BLD

## (undated) DEVICE — KIT TOTAL KNEE TKOFG OMC

## (undated) DEVICE — GLOVE SENSICARE PI GRN 9

## (undated) DEVICE — DRAPE TOP 53X102IN

## (undated) DEVICE — SUT FIBERWIRE 2-0 50 BLK

## (undated) DEVICE — FULLY FLUTED REAMER